# Patient Record
Sex: FEMALE | Race: WHITE | NOT HISPANIC OR LATINO | Employment: UNEMPLOYED | URBAN - METROPOLITAN AREA
[De-identification: names, ages, dates, MRNs, and addresses within clinical notes are randomized per-mention and may not be internally consistent; named-entity substitution may affect disease eponyms.]

---

## 2019-01-21 ENCOUNTER — APPOINTMENT (EMERGENCY)
Dept: CT IMAGING | Facility: HOSPITAL | Age: 54
End: 2019-01-21
Payer: MEDICARE

## 2019-01-21 ENCOUNTER — HOSPITAL ENCOUNTER (EMERGENCY)
Facility: HOSPITAL | Age: 54
Discharge: HOME | End: 2019-01-21
Attending: EMERGENCY MEDICINE | Admitting: EMERGENCY MEDICINE
Payer: MEDICARE

## 2019-01-21 VITALS
HEART RATE: 81 BPM | DIASTOLIC BLOOD PRESSURE: 61 MMHG | OXYGEN SATURATION: 99 % | WEIGHT: 180 LBS | RESPIRATION RATE: 20 BRPM | HEIGHT: 61 IN | TEMPERATURE: 97.5 F | SYSTOLIC BLOOD PRESSURE: 114 MMHG | BODY MASS INDEX: 33.99 KG/M2

## 2019-01-21 DIAGNOSIS — J06.9 URI (UPPER RESPIRATORY INFECTION): Primary | ICD-10-CM

## 2019-01-21 DIAGNOSIS — R42 VERTIGO: ICD-10-CM

## 2019-01-21 LAB
ALBUMIN SERPL BCP-MCNC: 4 G/DL (ref 3.5–5)
ALP SERPL-CCNC: 116 U/L (ref 46–116)
ALT SERPL W P-5'-P-CCNC: 78 U/L (ref 12–78)
ANION GAP SERPL CALCULATED.3IONS-SCNC: 11 MMOL/L (ref 4–13)
AST SERPL W P-5'-P-CCNC: 67 U/L (ref 5–45)
ATRIAL RATE: 88 BPM
BASOPHILS # BLD AUTO: 0.03 THOUSANDS/ΜL (ref 0–0.1)
BASOPHILS NFR BLD AUTO: 0 % (ref 0–1)
BILIRUB SERPL-MCNC: 0.6 MG/DL (ref 0.2–1)
BUN SERPL-MCNC: 13 MG/DL (ref 5–25)
CALCIUM SERPL-MCNC: 9.1 MG/DL (ref 8.3–10.1)
CHLORIDE SERPL-SCNC: 98 MMOL/L (ref 100–108)
CLARITY, POC: CLEAR
CO2 SERPL-SCNC: 29 MMOL/L (ref 21–32)
COLOR, POC: YELLOW
CREAT SERPL-MCNC: 0.72 MG/DL (ref 0.6–1.3)
EOSINOPHIL # BLD AUTO: 0.04 THOUSAND/ΜL (ref 0–0.61)
EOSINOPHIL NFR BLD AUTO: 1 % (ref 0–6)
ERYTHROCYTE [DISTWIDTH] IN BLOOD BY AUTOMATED COUNT: 12.2 % (ref 11.6–15.1)
EXT BILIRUBIN, UA: NORMAL
EXT BLOOD URINE: NORMAL
EXT GLUCOSE, UA: NORMAL
EXT KETONES: NORMAL
EXT NITRITE, UA: NORMAL
EXT PH, UA: 6.5
EXT PROTEIN, UA: NORMAL
EXT SPECIFIC GRAVITY, UA: 1.02
EXT UROBILINOGEN: 0.2
GFR SERPL CREATININE-BSD FRML MDRD: 96 ML/MIN/1.73SQ M
GLUCOSE SERPL-MCNC: 142 MG/DL (ref 65–140)
HCT VFR BLD AUTO: 45.3 % (ref 34.8–46.1)
HGB BLD-MCNC: 14.9 G/DL (ref 11.5–15.4)
IMM GRANULOCYTES # BLD AUTO: 0.02 THOUSAND/UL (ref 0–0.2)
IMM GRANULOCYTES NFR BLD AUTO: 0 % (ref 0–2)
LYMPHOCYTES # BLD AUTO: 1.03 THOUSANDS/ΜL (ref 0.6–4.47)
LYMPHOCYTES NFR BLD AUTO: 14 % (ref 14–44)
MCH RBC QN AUTO: 29.7 PG (ref 26.8–34.3)
MCHC RBC AUTO-ENTMCNC: 32.9 G/DL (ref 31.4–37.4)
MCV RBC AUTO: 90 FL (ref 82–98)
MONOCYTES # BLD AUTO: 0.77 THOUSAND/ΜL (ref 0.17–1.22)
MONOCYTES NFR BLD AUTO: 11 % (ref 4–12)
NEUTROPHILS # BLD AUTO: 5.28 THOUSANDS/ΜL (ref 1.85–7.62)
NEUTS SEG NFR BLD AUTO: 74 % (ref 43–75)
NRBC BLD AUTO-RTO: 0 /100 WBCS
P AXIS: 74 DEGREES
PLATELET # BLD AUTO: 335 THOUSANDS/UL (ref 149–390)
PMV BLD AUTO: 8.8 FL (ref 8.9–12.7)
POTASSIUM SERPL-SCNC: 4 MMOL/L (ref 3.5–5.3)
PR INTERVAL: 194 MS
PROT SERPL-MCNC: 8.3 G/DL (ref 6.4–8.2)
QRS AXIS: 125 DEGREES
QRSD INTERVAL: 84 MS
QT INTERVAL: 342 MS
QTC INTERVAL: 413 MS
RBC # BLD AUTO: 5.02 MILLION/UL (ref 3.81–5.12)
SODIUM SERPL-SCNC: 138 MMOL/L (ref 136–145)
T WAVE AXIS: 90 DEGREES
TROPONIN I SERPL-MCNC: <0.02 NG/ML
VENTRICULAR RATE: 88 BPM
WBC # BLD AUTO: 7.17 THOUSAND/UL (ref 4.31–10.16)
WBC # BLD EST: NORMAL 10*3/UL

## 2019-01-21 PROCEDURE — 70450 CT HEAD/BRAIN W/O DYE: CPT

## 2019-01-21 PROCEDURE — 84484 ASSAY OF TROPONIN QUANT: CPT | Performed by: PHYSICIAN ASSISTANT

## 2019-01-21 PROCEDURE — 93005 ELECTROCARDIOGRAM TRACING: CPT

## 2019-01-21 PROCEDURE — 80053 COMPREHEN METABOLIC PANEL: CPT | Performed by: PHYSICIAN ASSISTANT

## 2019-01-21 PROCEDURE — 36415 COLL VENOUS BLD VENIPUNCTURE: CPT | Performed by: PHYSICIAN ASSISTANT

## 2019-01-21 PROCEDURE — 85025 COMPLETE CBC W/AUTO DIFF WBC: CPT | Performed by: PHYSICIAN ASSISTANT

## 2019-01-21 PROCEDURE — 81002 URINALYSIS NONAUTO W/O SCOPE: CPT | Performed by: PHYSICIAN ASSISTANT

## 2019-01-21 PROCEDURE — 99284 EMERGENCY DEPT VISIT MOD MDM: CPT

## 2019-01-21 PROCEDURE — 93010 ELECTROCARDIOGRAM REPORT: CPT | Performed by: INTERNAL MEDICINE

## 2019-01-21 RX ORDER — MECLIZINE HYDROCHLORIDE 25 MG/1
25 TABLET ORAL 3 TIMES DAILY PRN
Qty: 20 TABLET | Refills: 0 | Status: SHIPPED | OUTPATIENT
Start: 2019-01-21 | End: 2019-11-12 | Stop reason: ALTCHOICE

## 2019-01-21 RX ORDER — ONDANSETRON 4 MG/1
4 TABLET, ORALLY DISINTEGRATING ORAL EVERY 6 HOURS PRN
Qty: 15 TABLET | Refills: 0 | Status: SHIPPED | OUTPATIENT
Start: 2019-01-21 | End: 2019-11-12 | Stop reason: ALTCHOICE

## 2019-01-21 RX ORDER — MECLIZINE HCL 12.5 MG/1
25 TABLET ORAL ONCE
Status: COMPLETED | OUTPATIENT
Start: 2019-01-21 | End: 2019-01-21

## 2019-01-21 RX ADMIN — MECLIZINE 25 MG: 12.5 TABLET ORAL at 11:25

## 2019-01-21 NOTE — ED PROVIDER NOTES
History  Chief Complaint   Patient presents with    Dizziness     Patient has had a could with a cough and early this morining felt dizzy " I was wakling into [place]"     Patient is a 49 y/o F with a h/o CAD that presents to the ED with dizziness that started at 3 AM this morning  She states she started with cold symptoms and cough yesterday, but this morning with dizziness  She describes it as the room is spinning  Worse with movement of eyes, walking, and opening eyes  Better when closing eyes and remaining still  No recent head injury  No OTC meds  No recent medication changes  No ear pain  She denies chest pain or SOB  Patient does not know what medications she takes and did not bring a list with her  History provided by:  Patient  Dizziness   Quality:  Room spinning and imbalance  Severity:  Moderate  Onset quality:  Sudden  Duration:  7 hours  Timing:  Constant  Progression:  Unchanged  Chronicity:  New  Context: eye movement and head movement    Relieved by:  Being still and closing eyes  Worsened by: Movement and eye movement  Ineffective treatments:  None tried  Associated symptoms: nausea    Associated symptoms: no blood in stool, no chest pain, no diarrhea, no headaches, no hearing loss, no palpitations, no shortness of breath, no syncope, no tinnitus, no vision changes, no vomiting and no weakness    Risk factors: heart disease        None       Past Medical History:   Diagnosis Date    Open injury of heart        No past surgical history on file  No family history on file  I have reviewed and agree with the history as documented  Social History   Substance Use Topics    Smoking status: Former Smoker    Smokeless tobacco: Not on file    Alcohol use Yes      Comment: social        Review of Systems   Constitutional: Negative for chills and fever  HENT: Positive for congestion and rhinorrhea  Negative for ear pain, hearing loss and tinnitus      Respiratory: Positive for cough  Negative for shortness of breath  Cardiovascular: Negative for chest pain, palpitations and syncope  Gastrointestinal: Positive for nausea  Negative for blood in stool, diarrhea and vomiting  Musculoskeletal: Negative for back pain and neck pain  Skin: Negative for color change and rash  Neurological: Positive for dizziness  Negative for seizures, syncope, facial asymmetry, speech difficulty, weakness, light-headedness, numbness and headaches  Psychiatric/Behavioral: Negative for confusion  All other systems reviewed and are negative  Physical Exam  Physical Exam   Constitutional: She is oriented to person, place, and time  She appears well-developed and well-nourished  She is cooperative  She does not appear ill  No distress  HENT:   Head: Normocephalic and atraumatic  Right Ear: Hearing and tympanic membrane normal    Left Ear: Hearing and tympanic membrane normal    Nose: Rhinorrhea present  Mouth/Throat: Oropharynx is clear and moist and mucous membranes are normal    Eyes: Pupils are equal, round, and reactive to light  Conjunctivae are normal    Patient unable to follow finger with her eyes due to the dizziness  Neck: Normal range of motion  Cardiovascular: Normal rate, regular rhythm and normal heart sounds  No murmur heard  Pulmonary/Chest: Effort normal and breath sounds normal    Musculoskeletal: Normal range of motion  She exhibits no edema  Neurological: She is alert and oriented to person, place, and time  She has normal strength  No cranial nerve deficit or sensory deficit  GCS eye subscore is 4  GCS verbal subscore is 5  GCS motor subscore is 6  Skin: Skin is warm and dry  No rash noted  She is not diaphoretic  No pallor  Psychiatric: She has a normal mood and affect  Nursing note and vitals reviewed        Vital Signs  ED Triage Vitals   Temperature Pulse Respirations Blood Pressure SpO2   01/21/19 1007 01/21/19 1001 01/21/19 1001 01/21/19 1001 01/21/19 1001   97 5 °F (36 4 °C) 87 22 124/60 100 %      Temp src Heart Rate Source Patient Position - Orthostatic VS BP Location FiO2 (%)   -- -- 01/21/19 1001 01/21/19 1001 --     Lying Right arm       Pain Score       01/21/19 1001       No Pain           Vitals:    01/21/19 1030 01/21/19 1045 01/21/19 1115 01/21/19 1130   BP: 105/59   114/61   Pulse: 82 83 82 83   Patient Position - Orthostatic VS:           Visual Acuity  Visual Acuity      Most Recent Value   L Pupil Size (mm)  3   R Pupil Size (mm)  3          ED Medications  Medications    EMS REPLENISHMENT MED (not administered)   meclizine (ANTIVERT) tablet 25 mg (25 mg Oral Given 1/21/19 1125)       Diagnostic Studies  Results Reviewed     Procedure Component Value Units Date/Time    POCT urinalysis dipstick [[id number]]  (Normal) Resulted:  01/21/19 1126    Lab Status:  Edited Result - FINAL Specimen:  Urine Updated:  01/21/19 1129     Color, UA YELLOW     Clarity, UA CLEAR     Glucose, UA (Ref: Negative) NEG     Bilirubin, UA (Ref: Negative) NEG     Ketones, UA (Ref: Negative) NEG     Spec Grav, UA (Ref:1 003-1 030) 1 020     Blood, UA (Ref: Negative) TRACE     pH, UA (Ref: 4 5-8 0) 6 5     Protein, UA (Ref: Negative) TRACE     Urobilinogen, UA (Ref: 0 2- 1 0) 0 2      Leukocytes, UA (Ref: Negative) NEG     Nitrite, UA (Ref: Negative) NEG    Troponin I [[id number]]  (Normal) Collected:  01/21/19 1025    Lab Status:  Final result Specimen:  Blood from Arm, Left Updated:  01/21/19 1107     Troponin I <0 02 ng/mL     Comprehensive metabolic panel [[id number]]  (Abnormal) Collected:  01/21/19 1025    Lab Status:  Final result Specimen:  Blood from Arm, Left Updated:  01/21/19 1105     Sodium 138 mmol/L      Potassium 4 0 mmol/L      Chloride 98 (L) mmol/L      CO2 29 mmol/L      ANION GAP 11 mmol/L      BUN 13 mg/dL      Creatinine 0 72 mg/dL      Glucose 142 (H) mg/dL      Calcium 9 1 mg/dL      AST 67 (H) U/L      ALT 78 U/L      Alkaline Phosphatase 116 U/L Total Protein 8 3 (H) g/dL      Albumin 4 0 g/dL      Total Bilirubin 0 60 mg/dL      eGFR 96 ml/min/1 73sq m     Narrative:         National Kidney Disease Education Program recommendations are as follows:  GFR calculation is accurate only with a steady state creatinine  Chronic Kidney disease less than 60 ml/min/1 73 sq  meters  Kidney failure less than 15 ml/min/1 73 sq  meters  CBC and differential [[id number]]  (Abnormal) Collected:  01/21/19 1025    Lab Status:  Final result Specimen:  Blood from Arm, Left Updated:  01/21/19 1048     WBC 7 17 Thousand/uL      RBC 5 02 Million/uL      Hemoglobin 14 9 g/dL      Hematocrit 45 3 %      MCV 90 fL      MCH 29 7 pg      MCHC 32 9 g/dL      RDW 12 2 %      MPV 8 8 (L) fL      Platelets 119 Thousands/uL      nRBC 0 /100 WBCs      Neutrophils Relative 74 %      Immat GRANS % 0 %      Lymphocytes Relative 14 %      Monocytes Relative 11 %      Eosinophils Relative 1 %      Basophils Relative 0 %      Neutrophils Absolute 5 28 Thousands/µL      Immature Grans Absolute 0 02 Thousand/uL      Lymphocytes Absolute 1 03 Thousands/µL      Monocytes Absolute 0 77 Thousand/µL      Eosinophils Absolute 0 04 Thousand/µL      Basophils Absolute 0 03 Thousands/µL                  CT head without contrast   Final Result by Joseph Ornelas DO (01/21 1104)      No acute intracranial abnormality                    Workstation performed: [id number]                    Procedures  ECG 12 Lead Documentation  Date/Time: 1/21/2019 10:00 AM  Performed by: Liz Kate by: Minnie Tracy     Indications / Diagnosis:  Dizzy  Patient location:  ED  Previous ECG:     Previous ECG:  Unavailable  Rate:     ECG rate:  88  Rhythm:     Rhythm: sinus rhythm    Conduction:     Conduction: abnormal      Abnormal conduction: LPFB    ST segments:     ST segments:  Normal  T waves:     T waves: non-specific             Phone Contacts  ED Phone Contact    ED Course  ED Course as of Jan 21 1147   Mon Jan 21, 2019   1136 Patient feeling better, able to tolerate po, ambulated to bathroom without difficulty  EOMI, no nystagmus  MDM  Number of Diagnoses or Management Options  URI (upper respiratory infection): minor  Vertigo: new and requires workup  Diagnosis management comments: Patient with dizziness, will check labs to r/o anemia, electrolyte abnormality, cardiac disease  CT scan to r/o brain hemorrhage or tumor  Patient did improve after taking antivert, ambulating without difficulty, tolerating po, sitting up in bed looking at phone, will d/c with close f/u with PCP  Amount and/or Complexity of Data Reviewed  Clinical lab tests: ordered and reviewed  Tests in the radiology section of CPT®: ordered and reviewed    Patient Progress  Patient progress: improved    CritCare Time    Disposition  Final diagnoses:   URI (upper respiratory infection)   Vertigo     Time reflects when diagnosis was documented in both MDM as applicable and the Disposition within this note     Time User Action Codes Description Comment    1/21/2019 11:41 AM Edward Martel [J06 [id number]] URI (upper respiratory infection)     1/21/2019 11:41 AM Edward Martel [[id number]] Vertigo       ED Disposition     ED Disposition Condition Comment    Discharge  Devi Chun discharge to home/self care      Condition at discharge: Stable        Follow-up Information     Follow up With Specialties Details Why Contact Info    Rula Huitron  In 2 days For recheck [address] [facility]  [phone]            Patient's Medications   Discharge Prescriptions    MECLIZINE (ANTIVERT) 25 MG TABLET    Take 1 tablet (25 mg total) by mouth 3 (three) times a day as needed for dizziness       Start Date: 1/21/2019 End Date: --       Order Dose: 25 mg       Quantity: 20 tablet    Refills: 0    ONDANSETRON (ZOFRAN-ODT) 4 MG DISINTEGRATING TABLET    Take 1 tablet (4 mg total) by mouth every 6 (six) hours as needed for nausea       Start Date: 1/21/2019 End Date: --       Order Dose: 4 mg       Quantity: 15 tablet    Refills: 0     No discharge procedures on file      ED Provider  Electronically Signed by           Emiliano Clemens PA-C  01/21/19 1143

## 2019-01-21 NOTE — DISCHARGE INSTRUCTIONS
Rest, increase fluids  Take antivert for dizziness, zofran as needed for nausea  Follow up with family doctor for recheck in 2-3 days or sooner if symptoms worsen  Dizziness, Ambulatory Care   GENERAL INFORMATION:   Dizziness  is a term used to describe a feeling of being off balance or unsteady  Common causes of dizziness are an inner ear fluid imbalance or a lack of oxygen in your blood  Your dizziness may be acute (lasts 3 days or less) or chronic (lasts longer than 3 days)  You may have dizzy spells that last from seconds to a few hours  Common symptoms related to dizziness include the following:   · A feeling that your surroundings are moving even though you are standing still    · Ringing in your ears or hearing loss     · Feeling faint or lightheaded     · Weakness or unsteadiness     · Double vision or eye movements you cannot control    · Nausea or vomiting     · Confusion  Seek immediate care for the following symptoms:   · You have a headache that does not go away with medicine  · You have shaking chills and a fever  · You vomit over and over with no relief  · Your vomit or bowel movements are red or black  · You have pain in your chest, back, or abdomen  · You have numbness, especially in your face, arms, or legs  · You have trouble moving your arms or legs  Treatment for dizziness  depends on the cause of your dizziness  You may need medicines to decrease your dizziness or nausea  You may need to be admitted to the hospital for treatment  Manage your symptoms:  Get up slowly from sitting or lying down  Do not drive or operate machinery when you are dizzy  Follow up with your healthcare provider as directed:  Write down your questions so you remember to ask them during your visits  CARE AGREEMENT:   You have the right to help plan your care  Learn about your health condition and how it may be treated   Discuss treatment options with your caregivers to decide what care you want to receive  You always have the right to refuse treatment  The above information is an  only  It is not intended as medical advice for individual conditions or treatments  Talk to your doctor, nurse or pharmacist before following any medical regimen to see if it is safe and effective for you  © 2014 [address] is for End User's use only and may not be sold, redistributed or otherwise used for commercial purposes  All illustrations and images included in CareNotes® are the copyrighted property of [employer]  or [employer]  Upper Respiratory Infection   WHAT YOU NEED TO KNOW:   An upper respiratory infection is also called the common cold  It is an infection that can affect your nose, throat, ears, and sinuses  For healthy people, the common cold is usually not serious and does not need special treatment  Cold symptoms are usually worst for the first 3 to 5 days  Most people get better in 7 to 14 days  You may continue to cough for 2 to 3 weeks  Colds are caused by viruses and do not get better with antibiotics  DISCHARGE INSTRUCTIONS:   Return to the emergency department if:   · You have chest pain or trouble breathing  Contact your healthcare provider if:   · You have a fever over 102ºF (39°C)  · Your sore throat gets worse or you see white or yellow spots in your throat  · Your symptoms get worse after 3 to 5 days or your cold is not better in 14 days  · You have a rash anywhere on your skin  · You have large, tender lumps in your neck  · You have thick, green or yellow drainage from your nose  · You cough up thick yellow, green, or bloody mucus  · You have vomiting for more than 24 hours and cannot keep fluids down  · You have a bad earache  · You have questions or concerns about your condition or care  Medicines:   You may need any of the following:  · Decongestants  help reduce nasal congestion and help you breathe more easily  If you take decongestant pills, they may make you feel restless or cause problems with your sleep  Do not use decongestant sprays for more than a few days  · Cough suppressants  help reduce coughing  Ask your healthcare provider which type of cough medicine is best for you  · NSAIDs , such as ibuprofen, help decrease swelling, pain, and fever  NSAIDs can cause stomach bleeding or kidney problems in certain people  If you take blood thinner medicine, always ask your healthcare provider if NSAIDs are safe for you  Always read the medicine label and follow directions  · Acetaminophen  decreases pain and fever  It is available without a doctor's order  Ask how much to take and how often to take it  Follow directions  Read the labels of all other medicines you are using to see if they also contain acetaminophen, or ask your doctor or pharmacist  Acetaminophen can cause liver damage if not taken correctly  Do not use more than 4 grams (4,000 milligrams) total of acetaminophen in one day  · Take your medicine as directed  Contact your healthcare provider if you think your medicine is not helping or if you have side effects  Tell him or her if you are allergic to any medicine  Keep a list of the medicines, vitamins, and herbs you take  Include the amounts, and when and why you take them  Bring the list or the pill bottles to follow-up visits  Carry your medicine list with you in case of an emergency  Follow up with your healthcare provider as directed:  Write down your questions so you remember to ask them during your visits  Self-care:   · Rest as much as possible  Slowly start to do more each day  · Drink more liquids as directed  Liquids will help thin and loosen mucus so you can cough it up  Liquids will also help prevent dehydration  Liquids that help prevent dehydration include water, fruit juice, and broth  Do not drink liquids that contain caffeine   Caffeine can increase your risk for dehydration  Ask your healthcare provider how much liquid to drink each day  · Soothe a sore throat  Gargle with warm salt water  This helps your sore throat feel better  Make salt water by dissolving ¼ teaspoon salt in 1 cup warm water  You may also suck on hard candy or throat lozenges  You may use a sore throat spray  · Use a humidifier or vaporizer  Use a cool mist humidifier or a vaporizer to increase air moisture in your home  This may make it easier for you to breathe and help decrease your cough  · Use saline nasal drops as directed  These help relieve congestion  · Apply petroleum-based jelly around the outside of your nostrils  This can decrease irritation from blowing your nose  · Do not smoke  Nicotine and other chemicals in cigarettes and cigars can make your symptoms worse  They can also cause infections such as bronchitis or pneumonia  Ask your healthcare provider for information if you currently smoke and need help to quit  E-cigarettes or smokeless tobacco still contain nicotine  Talk to your healthcare provider before you use these products  Prevent spreading your cold to others:   · Try to stay away from other people during the first 2 to 3 days of your cold when it is more easily spread  · Do not share food or drinks  · Do not share hand towels with household members  · Wash your hands often, especially after you blow your nose  Turn away from other people and cover your mouth and nose with a tissue when you sneeze or cough  © 2017 [address] Information is for End User's use only and may not be sold, redistributed or otherwise used for commercial purposes  All illustrations and images included in CareNotes® are the copyrighted property of [employer]  or [employer]  The above information is an  only  It is not intended as medical advice for individual conditions or treatments   Talk to your doctor, nurse or pharmacist before following any medical regimen to see if it is safe and effective for you  Vertigo   WHAT YOU NEED TO KNOW:   Vertigo is a condition that causes you to feel dizzy  You may feel that you or everything around you is moving or spinning  You may also feel like you are being pulled down or toward your side  DISCHARGE INSTRUCTIONS:   Return to the emergency department if:   · You have a headache and a stiff neck  · You have shaking chills and a fever  · You vomit over and over with no relief  · You have blood, pus, or fluid coming out of your ears  · You are confused  Contact your healthcare provider if:   · Your symptoms do not get better with treatment  · You have questions about your condition or care  Medicines:   · Medicine  may be given to help relieve your symptoms  · Take your medicine as directed  Contact your healthcare provider if you think your medicine is not helping or if you have side effects  Tell him or her if you are allergic to any medicine  Keep a list of the medicines, vitamins, and herbs you take  Include the amounts, and when and why you take them  Bring the list or the pill bottles to follow-up visits  Carry your medicine list with you in case of an emergency  Manage your symptoms:   · Do not drive , walk without help, or operate heavy machinery when you are dizzy  · Move slowly  when you move from one position to another position  Get up slowly from sitting or lying down  Sit or lie down right away if you feel dizzy  · Drink plenty of liquids  Liquids help prevent dehydration  Ask how much liquid to drink each day and which liquids are best for you  · Vestibular and balance rehabilitation therapy (VBRT)  is used to teach you exercises to improve your balance and strength  These exercises may help decrease your vertigo and improve your balance  Ask for more information about this therapy    Follow up with your healthcare provider as directed:  Write down your questions so you remember to ask them during your visits  © 2017 [address] Information is for End User's use only and may not be sold, redistributed or otherwise used for commercial purposes  All illustrations and images included in CareNotes® are the copyrighted property of [employer]  or [employer]  The above information is an  only  It is not intended as medical advice for individual conditions or treatments  Talk to your doctor, nurse or pharmacist before following any medical regimen to see if it is safe and effective for you

## 2019-11-12 ENCOUNTER — OFFICE VISIT (OUTPATIENT)
Dept: CARDIOLOGY CLINIC | Facility: CLINIC | Age: 54
End: 2019-11-12
Payer: MEDICARE

## 2019-11-12 VITALS
DIASTOLIC BLOOD PRESSURE: 86 MMHG | HEART RATE: 64 BPM | WEIGHT: 193 LBS | BODY MASS INDEX: 36.44 KG/M2 | SYSTOLIC BLOOD PRESSURE: 144 MMHG | HEIGHT: 61 IN

## 2019-11-12 DIAGNOSIS — I25.10 CORONARY ARTERY DISEASE INVOLVING NATIVE CORONARY ARTERY OF NATIVE HEART WITHOUT ANGINA PECTORIS: Primary | ICD-10-CM

## 2019-11-12 DIAGNOSIS — Z95.5 S/P INSERTION OF NON-DRUG ELUTING CORONARY ARTERY STENT: ICD-10-CM

## 2019-11-12 DIAGNOSIS — Z95.1 S/P CABG X 2: ICD-10-CM

## 2019-11-12 DIAGNOSIS — E78.00 HYPERCHOLESTEREMIA: ICD-10-CM

## 2019-11-12 DIAGNOSIS — I25.810 CORONARY ARTERY DISEASE INVOLVING CORONARY BYPASS GRAFT OF NATIVE HEART WITHOUT ANGINA PECTORIS: ICD-10-CM

## 2019-11-12 DIAGNOSIS — I10 ESSENTIAL HYPERTENSION: ICD-10-CM

## 2019-11-12 PROBLEM — R06.09 DYSPNEA ON EXERTION: Status: ACTIVE | Noted: 2019-11-12

## 2019-11-12 PROBLEM — R06.00 DYSPNEA ON EXERTION: Status: ACTIVE | Noted: 2019-11-12

## 2019-11-12 PROCEDURE — 99204 OFFICE O/P NEW MOD 45 MIN: CPT | Performed by: INTERNAL MEDICINE

## 2019-11-12 RX ORDER — ESCITALOPRAM OXALATE 5 MG/1
5 TABLET ORAL DAILY
COMMUNITY
End: 2020-01-10 | Stop reason: SDUPTHER

## 2019-11-12 RX ORDER — CARVEDILOL 6.25 MG/1
6.25 TABLET ORAL 2 TIMES DAILY WITH MEALS
COMMUNITY
Start: 2019-04-18 | End: 2020-01-10 | Stop reason: SDUPTHER

## 2019-11-12 RX ORDER — ATORVASTATIN CALCIUM 80 MG/1
80 TABLET, FILM COATED ORAL DAILY
COMMUNITY
Start: 2019-10-10 | End: 2020-01-10 | Stop reason: SDUPTHER

## 2019-11-12 RX ORDER — ACETAMINOPHEN 500 MG
1000 TABLET ORAL EVERY 6 HOURS PRN
COMMUNITY
End: 2021-01-25

## 2019-11-12 RX ORDER — ASPIRIN 81 MG/1
81 TABLET ORAL DAILY
COMMUNITY

## 2019-11-12 RX ORDER — LOSARTAN POTASSIUM AND HYDROCHLOROTHIAZIDE 12.5; 5 MG/1; MG/1
1 TABLET ORAL DAILY
COMMUNITY
Start: 2019-10-10 | End: 2020-01-10 | Stop reason: SDUPTHER

## 2019-11-12 RX ORDER — ZOLPIDEM TARTRATE 10 MG/1
10 TABLET ORAL DAILY PRN
COMMUNITY
Start: 2019-08-26 | End: 2020-01-10 | Stop reason: SDUPTHER

## 2019-11-12 RX ORDER — GLIPIZIDE 5 MG/1
5 TABLET, FILM COATED, EXTENDED RELEASE ORAL DAILY
COMMUNITY
Start: 2019-09-19 | End: 2020-01-10 | Stop reason: SDUPTHER

## 2019-11-12 RX ORDER — CLOPIDOGREL BISULFATE 75 MG/1
75 TABLET ORAL DAILY
COMMUNITY
End: 2020-01-10 | Stop reason: SDUPTHER

## 2019-11-12 NOTE — PROGRESS NOTES
Cardiology Consultation     Mat Galvan  [id number]  1965  [facility] [address] [facility] [place] [address] [address] [phone]    1  Coronary artery disease involving native coronary artery of native heart without angina pectoris  Comprehensive metabolic panel    Comprehensive metabolic panel    TSH, 3rd generation    NM myocardial perfusion spect (stress and/or rest)    Echo complete with contrast if indicated   2  Coronary artery disease involving coronary bypass graft of native heart without angina pectoris  Echo complete with contrast if indicated   3  S/P CABG x 2  NM myocardial perfusion spect (stress and/or rest)   4  S/P insertion of non-drug eluting coronary artery stent  NM myocardial perfusion spect (stress and/or rest)    Echo complete with contrast if indicated   5  Essential hypertension     6  Hypercholesteremia  Comprehensive metabolic panel    HEMOGLOBIN A1C W/ EAG ESTIMATION    Lipid Panel with Direct LDL reflex    Comprehensive metabolic panel       HPI:    Mrs Jairon Owusu comes in for a consultation to establish care given her cardiac history  Hernando Cote had coronary artery disease diagnosed back in 2011  In January 2011 she had a drug-eluting stent placed to the left circumflex  Then in 2012 she had CABG x2 with a LIMA to the LAD and a SVG to RCA  She had this work done at [facility]   She was then following with [facility], in 2015 had a cardiac catheterization that showed critical stenosis in the distal portion of her vein graft to her RCA  She received a drug-eluting stent to this region  She has for the most part done well since  She has normal LV systolic function without valve disease by echocardiogram earlier this year  She does have obstructive sleep apnea, and has been off of CPAP until recently  Within the last month she restarted this    She also has diabetes mellitus, and has not had any follow-up with primary care provider recently  She lived near [place], [place] for a few years and earlier this year reestablished here  She is a former tobacco smoker, currently abstaining but she does occasionally smoke marijuana  Jesus Kate has noticed some worsening exercise tolerance, fatigue and shortness of breath  She does not have a car and she walks to work, as she works at [employer], and she notices that the walk is taking a bit longer and it is a little bit more taxing on her  She notices shortness of breath with exertion, particularly going up a hill and worsening fatigue  She denies chest pain or any other symptoms of angina  She does have mild lower extremity edema, but denies any other signs or symptoms of CHF  No palpitations, lightheadedness or any syncope  She did have some palpitations earlier this year in wore a Holter monitor which was essentially normal   She has been tolerating all of her medical therapy  She has noticed some tingling/paresthesias in her feet        Patient Active Problem List   Diagnosis    Coronary artery disease involving native coronary artery of native heart without angina pectoris    S/P CABG x 2    Coronary artery disease involving coronary bypass graft of native heart without angina pectoris    S/P insertion of non-drug eluting coronary artery stent    Essential hypertension    Hypercholesteremia    Dyspnea on exertion     Past Medical History:   Diagnosis Date    Open injury of heart      Social History     Socioeconomic History    Marital status:      Spouse name: Not on file    Number of children: Not on file    Years of education: Not on file    Highest education level: Not on file   Occupational History    Not on file   Social Needs    Financial resource strain: Not on file    Food insecurity:     Worry: Not on file     Inability: Not on file    Transportation needs:     Medical: Not on file Non-medical: Not on file   Tobacco Use    Smoking status: Former Smoker   Substance and Sexual Activity    Alcohol use: Yes     Comment: social    Drug use: No    Sexual activity: Not on file   Lifestyle    Physical activity:     Days per week: Not on file     Minutes per session: Not on file    Stress: Not on file   Relationships    Social connections:     Talks on phone: Not on file     Gets together: Not on file     Attends [employer] service: Not on file     Active member of club or organization: Not on file     Attends meetings of clubs or organizations: Not on file     Relationship status: Not on file    Intimate partner violence:     Fear of current or ex partner: Not on file     Emotionally abused: Not on file     Physically abused: Not on file     Forced sexual activity: Not on file   Other Topics Concern    Not on file   Social History Narrative    Not on file      No family history on file  No past surgical history on file      Current Outpatient Medications:     acetaminophen (TYLENOL) 500 mg tablet, Take 1,000 mg by mouth every 6 (six) hours as needed, Disp: , Rfl:     aspirin (ECOTRIN LOW STRENGTH) 81 mg EC tablet, Take 81 mg by mouth daily, Disp: , Rfl:     atorvastatin (LIPITOR) 80 mg tablet, Take 80 mg by mouth daily, Disp: , Rfl:     carvedilol (COREG) 6 25 mg tablet, Take 6 25 mg by mouth 2 (two) times a day with meals , Disp: , Rfl:     clopidogrel (PLAVIX) 75 mg tablet, Take 75 mg by mouth daily, Disp: , Rfl:     escitalopram (LEXAPRO) 5 mg tablet, Take 5 mg by mouth daily, Disp: , Rfl:     glipiZIDE (GLUCOTROL XL) 5 mg 24 hr tablet, Take 5 mg by mouth daily, Disp: , Rfl:     losartan-hydrochlorothiazide (HYZAAR) 50-12 5 mg per tablet, Take 1 tablet by mouth daily, Disp: , Rfl:     MELATONIN PO, Take 12 mg by mouth daily at bedtime , Disp: , Rfl:     zolpidem (AMBIEN) 10 mg tablet, Take 10 mg by mouth daily as needed, Disp: , Rfl:   Allergies   Allergen Reactions    Metformin  Penicillins      Vitals:    11/12/19 1300   BP: 144/86   BP Location: Left arm   Patient Position: Sitting   Cuff Size: Standard   Pulse: 64   Weight: 87 5 kg (193 lb)   Height: 5' 1" (1 549 m)       Labs:  Lab Results   Component Value Date    K 4 0 01/21/2019    CL 98 (L) 01/21/2019    CO2 29 01/21/2019    BUN 13 01/21/2019    CREATININE 0 72 01/21/2019    CALCIUM 9 1 01/21/2019     Lab Results   Component Value Date    WBC 7 17 01/21/2019    HGB 14 9 01/21/2019    HCT 45 3 01/21/2019    MCV 90 01/21/2019     01/21/2019       Imaging:     ECHO (3/2019):   Normal left ventricular chamber size  Normal left ventricular wall thickness      Abnormal septal motion in setting of prior thoracotomy  Normal left     ventricular systolic function      Aortic sclerosis      Mild mitral regurgitation      Mild tricuspid regurgitation      Mild pulmonic regurgitation  The pulmonary artery pressure could not be calculated due to an     incomplete tricuspid regurgitant gradient measurement  Diastolic data     suggests normal left atrial pressure    Normal right atrial pressures (3mmHg) evidenced by normal size inferior     vena cava (<2 1cm) with normal inspiratory collapse (>50%)      Visually Estimated LV Ejection Fraction is:60%       Review of Systems:  Review of Systems   Constitutional: Positive for fatigue  HENT: Negative  Eyes: Negative  Respiratory: Positive for shortness of breath  Cardiovascular: Negative  Gastrointestinal: Negative  Musculoskeletal: Negative  Skin: Negative  Allergic/Immunologic: Negative  Neurological: Negative  Hematological: Negative  Psychiatric/Behavioral: Negative  All other systems reviewed and are negative  Vitals:    11/12/19 1300   BP: 144/86   Pulse: 64     Physical Exam:  Physical Exam   Constitutional: She is oriented to person, place, and time  She appears well-developed and well-nourished  HENT:   Head: Normocephalic and atraumatic  Eyes: Pupils are equal, round, and reactive to light  EOM are normal  Right eye exhibits no discharge  Left eye exhibits no discharge  No scleral icterus  Neck: Normal range of motion  Neck supple  No JVD present  No thyromegaly present  Cardiovascular: Normal rate, regular rhythm, S1 normal, S2 normal, normal heart sounds, intact distal pulses and normal pulses  No extrasystoles are present  Exam reveals no gallop, no friction rub and no decreased pulses  No murmur heard  Pulmonary/Chest: Effort normal and breath sounds normal  No respiratory distress  She has no wheezes  She has no rales  Abdominal: Soft  Bowel sounds are normal  She exhibits no distension  There is no tenderness  Musculoskeletal: Normal range of motion  She exhibits no tenderness or deformity  Left lower leg: She exhibits edema  Neurological: She is alert and oriented to person, place, and time  No cranial nerve deficit  Skin: Skin is warm and dry  No rash noted  Psychiatric: She has a normal mood and affect  Judgment and thought content normal    Nursing note and vitals reviewed  Discussion/Summary:    1  Dyspnea on exertion and worsening fatigue - There are multiple things that can be causing this  One likely reason is the fact that she has been off of CPAP for some time, and recently restarted this  However given her cardiovascular history we are going to do updated testing which will include an ischemic evaluation  She will have a stress nuclear study and echocardiogram in the near future  We also ordered a complete set of updated blood work  I have asked her to also establish care with a PCP locally to manage her diabetes and help with her other symptoms  2   CAD - Haven Ramos had drug-eluting stent placed to her left circumflex in 2011, CABG x2 in 2012, and then a drug-eluting stent placed to her SVG to RCA graft in 2015  She has done well since    However given this history, and some change in symptoms as described above, we are going to have her do an updated ischemic evaluation  She has been on dual anti-platelet therapy, and for testing is unremarkable she can stop the Plavix and just continue aspirin therapy  3   Hypertension - Mildly elevated  She should periodically check this at home  I have also asked her to establish care with a PCP  4   Hypercholesterolemia - She will remain on atorvastatin 80 mg daily  We ordered updated blood work that will include a lipid profile as well  5   GABI - She had been off CPAP, with moving and it was packed away  However, she recently restarted this and I have encouraged her to remain compliant on this  This could be contributing to her symptoms described above  6   DM - We ordered updated blood work for this as well  Again, I have asked her to establish care with a PCP especially given her paresthesias  Counseling / Coordination of Care  Total office time spent today 40 minutes  Greater than 50% of total time was spent with the patient and / or family counseling and / or coordination of care

## 2019-11-26 ENCOUNTER — HOSPITAL ENCOUNTER (OUTPATIENT)
Dept: NON INVASIVE DIAGNOSTICS | Facility: CLINIC | Age: 54
Discharge: HOME | End: 2019-11-26
Payer: MEDICARE

## 2019-11-26 DIAGNOSIS — Z95.1 S/P CABG X 2: ICD-10-CM

## 2019-11-26 DIAGNOSIS — I25.810 CORONARY ARTERY DISEASE INVOLVING CORONARY BYPASS GRAFT OF NATIVE HEART WITHOUT ANGINA PECTORIS: ICD-10-CM

## 2019-11-26 DIAGNOSIS — I25.10 CORONARY ARTERY DISEASE INVOLVING NATIVE CORONARY ARTERY OF NATIVE HEART WITHOUT ANGINA PECTORIS: ICD-10-CM

## 2019-11-26 DIAGNOSIS — Z95.5 S/P INSERTION OF NON-DRUG ELUTING CORONARY ARTERY STENT: ICD-10-CM

## 2019-11-26 LAB
CHEST PAIN STATEMENT: NORMAL
MAX DIASTOLIC BP: 76 MMHG
MAX HEART RATE: 134 BPM
MAX PREDICTED HEART RATE: 166 BPM
MAX. SYSTOLIC BP: 178 MMHG
PROTOCOL NAME: NORMAL
REASON FOR TERMINATION: NORMAL
TARGET HR FORMULA: NORMAL
TEST INDICATION: NORMAL
TIME IN EXERCISE PHASE: NORMAL

## 2019-11-26 PROCEDURE — A9502 TC99M TETROFOSMIN: HCPCS

## 2019-11-26 PROCEDURE — 93306 TTE W/DOPPLER COMPLETE: CPT

## 2019-11-26 PROCEDURE — 93306 TTE W/DOPPLER COMPLETE: CPT | Performed by: INTERNAL MEDICINE

## 2019-11-26 PROCEDURE — 78452 HT MUSCLE IMAGE SPECT MULT: CPT | Performed by: INTERNAL MEDICINE

## 2019-11-26 PROCEDURE — 93016 CV STRESS TEST SUPVJ ONLY: CPT | Performed by: INTERNAL MEDICINE

## 2019-11-26 PROCEDURE — 78452 HT MUSCLE IMAGE SPECT MULT: CPT

## 2019-11-26 PROCEDURE — 93017 CV STRESS TEST TRACING ONLY: CPT

## 2019-11-26 PROCEDURE — 93018 CV STRESS TEST I&R ONLY: CPT | Performed by: INTERNAL MEDICINE

## 2019-12-23 ENCOUNTER — TELEPHONE (OUTPATIENT)
Dept: CARDIOLOGY CLINIC | Facility: CLINIC | Age: 54
End: 2019-12-23

## 2019-12-23 NOTE — TELEPHONE ENCOUNTER
----- Message from Juanito Martinez MD sent at 12/18/2019  1:34 PM EST -----  Please call the patient regarding her stress nuclear study and echocardiogram   Her stress nuclear study for the most part looked fine  There was no ischemia  She has a very small scar on the inferior/posterior part of her apex corresponding to her prior vein graft blockage which was expected  Her echocardiogram showed normal LV function without significant valve disease  Thank you

## 2020-01-10 ENCOUNTER — TELEPHONE (OUTPATIENT)
Dept: FAMILY MEDICINE CLINIC | Facility: HOSPITAL | Age: 55
End: 2020-01-10

## 2020-01-10 ENCOUNTER — OFFICE VISIT (OUTPATIENT)
Dept: FAMILY MEDICINE CLINIC | Facility: HOSPITAL | Age: 55
End: 2020-01-10
Payer: MEDICARE

## 2020-01-10 ENCOUNTER — HOSPITAL ENCOUNTER (OUTPATIENT)
Dept: RADIOLOGY | Facility: HOSPITAL | Age: 55
Discharge: HOME | End: 2020-01-10
Payer: MEDICARE

## 2020-01-10 VITALS
HEART RATE: 73 BPM | BODY MASS INDEX: 37.69 KG/M2 | TEMPERATURE: 97.1 F | HEIGHT: 60 IN | WEIGHT: 192 LBS | OXYGEN SATURATION: 97 % | SYSTOLIC BLOOD PRESSURE: 102 MMHG | DIASTOLIC BLOOD PRESSURE: 68 MMHG

## 2020-01-10 DIAGNOSIS — Z00.00 MEDICARE ANNUAL WELLNESS VISIT, SUBSEQUENT: ICD-10-CM

## 2020-01-10 DIAGNOSIS — I10 ESSENTIAL HYPERTENSION: ICD-10-CM

## 2020-01-10 DIAGNOSIS — M79.672 FOOT PAIN, LEFT: ICD-10-CM

## 2020-01-10 DIAGNOSIS — G47.00 INSOMNIA, UNSPECIFIED TYPE: ICD-10-CM

## 2020-01-10 DIAGNOSIS — G47.37 CENTRAL SLEEP APNEA DUE TO MEDICAL CONDITION: ICD-10-CM

## 2020-01-10 DIAGNOSIS — E11.65 TYPE 2 DIABETES MELLITUS WITH HYPERGLYCEMIA, WITHOUT LONG-TERM CURRENT USE OF INSULIN (HCC): ICD-10-CM

## 2020-01-10 DIAGNOSIS — M79.672 FOOT PAIN, LEFT: Primary | ICD-10-CM

## 2020-01-10 DIAGNOSIS — F32.A DEPRESSION, UNSPECIFIED DEPRESSION TYPE: ICD-10-CM

## 2020-01-10 DIAGNOSIS — I25.10 CORONARY ARTERY DISEASE INVOLVING NATIVE CORONARY ARTERY OF NATIVE HEART WITHOUT ANGINA PECTORIS: Primary | ICD-10-CM

## 2020-01-10 DIAGNOSIS — E78.00 HYPERCHOLESTEREMIA: ICD-10-CM

## 2020-01-10 PROBLEM — G47.30 SLEEP APNEA: Status: ACTIVE | Noted: 2020-01-10

## 2020-01-10 PROCEDURE — 73630 X-RAY EXAM OF FOOT: CPT

## 2020-01-10 PROCEDURE — 99204 OFFICE O/P NEW MOD 45 MIN: CPT | Performed by: NURSE PRACTITIONER

## 2020-01-10 PROCEDURE — G0439 PPPS, SUBSEQ VISIT: HCPCS | Performed by: NURSE PRACTITIONER

## 2020-01-10 RX ORDER — ESCITALOPRAM OXALATE 5 MG/1
5 TABLET ORAL DAILY
Qty: 90 TABLET | Refills: 0 | Status: SHIPPED | OUTPATIENT
Start: 2020-01-10 | End: 2020-08-21

## 2020-01-10 RX ORDER — GLIPIZIDE 5 MG/1
5 TABLET, FILM COATED, EXTENDED RELEASE ORAL DAILY
Qty: 90 TABLET | Refills: 0 | Status: SHIPPED | OUTPATIENT
Start: 2020-01-10 | End: 2020-04-04

## 2020-01-10 RX ORDER — CARVEDILOL 6.25 MG/1
6.25 TABLET ORAL 2 TIMES DAILY WITH MEALS
Qty: 180 TABLET | Refills: 0 | Status: SHIPPED | OUTPATIENT
Start: 2020-01-10 | End: 2020-04-17

## 2020-01-10 RX ORDER — LOSARTAN POTASSIUM AND HYDROCHLOROTHIAZIDE 12.5; 5 MG/1; MG/1
1 TABLET ORAL DAILY
Qty: 90 TABLET | Refills: 0 | Status: SHIPPED | OUTPATIENT
Start: 2020-01-10 | End: 2020-06-02

## 2020-01-10 RX ORDER — ATORVASTATIN CALCIUM 80 MG/1
80 TABLET, FILM COATED ORAL DAILY
Qty: 90 TABLET | Refills: 0 | Status: SHIPPED | OUTPATIENT
Start: 2020-01-10 | End: 2020-04-17

## 2020-01-10 RX ORDER — ZOLPIDEM TARTRATE 10 MG/1
10 TABLET ORAL DAILY PRN
Qty: 90 TABLET | Refills: 0 | Status: SHIPPED | OUTPATIENT
Start: 2020-01-10 | End: 2020-05-29

## 2020-01-10 RX ORDER — CLOPIDOGREL BISULFATE 75 MG/1
75 TABLET ORAL DAILY
Qty: 90 TABLET | Refills: 0 | Status: SHIPPED | OUTPATIENT
Start: 2020-01-10 | End: 2020-11-09

## 2020-01-10 NOTE — ASSESSMENT & PLAN NOTE
Refill issued to continue high dose statin  She has orders to update FLP per cardiology - urged she complete ASAP

## 2020-01-10 NOTE — PROGRESS NOTES
Assessment/Plan:    DM (diabetes mellitus) ([facility] [address] )    No results found for: HGBA1C     On daily glipizide for what pt reports as IFG  She has orders to complete A1C per cardiology and urged she do so ASAP  On daily ARB  Discuss eye and foot exams at next appt in 3 months  Sleep apnea  Compliant w/c-pap  Coronary artery disease involving native coronary artery of native heart without angina pectoris  Maintain cardiology follow up as planned  Med refills issued  Return in 3 months for next routine OV  Hypertension  Stable BP control on current meds  Refills issued to continue same dose  Return in 3 months for next BP check  Hyperlipidemia  Refill issued to continue high dose statin  She has orders to update FLP per cardiology - urged she complete ASAP  BMI 37 0-37 9, adult  Weight loss encouraged w/diet and exercise efforts  Insomnia  PDMP reviewed and refill issued to continue zolpidem as rx'd  Depression  Mood stable w/PHQ score of 5 on daily sertraline  Refill issued to continue same dose  Return in 3 months for next med check  Diagnoses and all orders for this visit:    Coronary artery disease involving native coronary artery of native heart without angina pectoris  -     atorvastatin (LIPITOR) 80 mg tablet; Take 1 tablet (80 mg total) by mouth daily  -     clopidogrel (PLAVIX) 75 mg tablet; Take 1 tablet (75 mg total) by mouth daily    Essential hypertension  -     carvedilol (COREG) 6 25 mg tablet; Take 1 tablet (6 25 mg total) by mouth 2 (two) times a day with meals  -     losartan-hydrochlorothiazide (HYZAAR) 50-12 5 mg per tablet; Take 1 tablet by mouth daily    Hypercholesteremia    Type 2 diabetes mellitus with hyperglycemia, without long-term current use of insulin (HCC)  -     glipiZIDE (GLUCOTROL XL) 5 mg 24 hr tablet; Take 1 tablet (5 mg total) by mouth daily    BMI 37 0-37 9, adult    Insomnia, unspecified type  -     zolpidem (AMBIEN) 10 mg tablet;  Take 1 tablet (10 mg total) by mouth daily as needed for sleep    Depression, unspecified depression type  -     escitalopram (LEXAPRO) 5 mg tablet; Take 1 tablet (5 mg total) by mouth daily    Central sleep apnea due to medical condition    Other orders  -     Cancel: XR foot 3+ vw left; Future          Subjective:      Patient ID: Sunshine Sylvester is a 47 y o  female  Here to establish care and get med refills  Used to follow w/[facility] doctors but no longer has a car  Diagnosed w/prediabetes at least a year ago and started glipizide  Doesn't check blood sugars at home  Does have supplies  Saw Dr Nixon Grace in November and had stress test  Has not done blood work as ordered  Lives at [facility] and walks to [place] to work  Left foot pain w/walking there and standing for 4 hours  Started in June  Has not been evaluated yet  Takes tylenol at times without benefit  No pain in right foot  Last pap smear in 2015  Post menopausal  Refuses gyn exam   No flu shot this season (she refuses)  Refuses mammogram and colon screening  The following portions of the patient's history were reviewed and updated as appropriate: allergies, current medications, past family history, past medical history, past social history, past surgical history and problem list     Review of Systems   Constitutional: Negative for activity change, appetite change and fatigue  Respiratory: Negative for cough and shortness of breath  Cardiovascular: Negative for chest pain and palpitations  Musculoskeletal: Positive for arthralgias (left foot pain)  Psychiatric/Behavioral: Positive for dysphoric mood  Objective:      /68 (Patient Position: Sitting, Cuff Size: Standard)   Pulse 73   Temp (!) 97 1 °F (36 2 °C) (Tympanic)   Ht 5' (1 524 m)   Wt 87 1 kg (192 lb)   SpO2 97%   BMI 37 50 kg/m²          Physical Exam   Constitutional: She is oriented to person, place, and time  She appears well-developed and well-nourished   No distress  HENT:   Head: Normocephalic and atraumatic  Eyes: No scleral icterus  Neck: No JVD present  Carotid bruit is not present  No thyromegaly present  Cardiovascular: Normal rate and regular rhythm  Murmur heard  Systolic murmur is present with a grade of 2/6  Pulmonary/Chest: Effort normal and breath sounds normal  No respiratory distress  Abdominal:   obese   Musculoskeletal: She exhibits no edema  Lymphadenopathy:     She has no cervical adenopathy  Neurological: She is alert and oriented to person, place, and time  Skin: Skin is warm and dry  Psychiatric: She has a normal mood and affect  Her behavior is normal  Judgment and thought content normal    Vitals reviewed  BMI Counseling: Body mass index is 37 5 kg/m²  The BMI is above normal  Nutrition recommendations include moderation in carbohydrate intake and reducing intake of cholesterol  Exercise recommendations include exercising 3-5 times per week

## 2020-01-10 NOTE — ASSESSMENT & PLAN NOTE
Maintain cardiology follow up as planned  Med refills issued  Return in 3 months for next routine OV

## 2020-01-10 NOTE — PATIENT INSTRUCTIONS
Obesity   AMBULATORY CARE:   Obesity  is when your body mass index (BMI) is greater than 30  Your healthcare provider will use your height and weight to measure your BMI  The risks of obesity include  many health problems, such as injuries or physical disability  You may need tests to check for the following:  · Diabetes     · High blood pressure or high cholesterol     · Heart disease     · Gallbladder or liver disease     · Cancer of the colon, breast, prostate, liver, or kidney     · Sleep apnea     · Arthritis or gout  Seek care immediately if:   · You have a severe headache, confusion, or difficulty speaking  · You have weakness on one side of your body  · You have chest pain, sweating, or shortness of breath  Contact your healthcare provider if:   · You have symptoms of gallbladder or liver disease, such as pain in your upper abdomen  · You have knee or hip pain and discomfort while walking  · You have symptoms of diabetes, such as intense hunger and thirst, and frequent urination  · You have symptoms of sleep apnea, such as snoring or daytime sleepiness  · You have questions or concerns about your condition or care  Treatment for obesity  focuses on helping you lose weight to improve your health  Even a small decrease in BMI can reduce the risk for many health problems  Your healthcare provider will help you set a weight-loss goal   · Lifestyle changes  are the first step in treating obesity  These include making healthy food choices and getting regular physical activity  Your healthcare provider may suggest a weight-loss program that involves coaching, education, and therapy  · Medicine  may help you lose weight when it is used with a healthy diet and physical activity  · Surgery  can help you lose weight if you are very obese and have other health problems  There are several types of weight-loss surgery  Ask your healthcare provider for more information    Be successful losing weight:   · Set small, realistic goals  An example of a small goal is to walk for 20 minutes 5 days a week  Anther goal is to lose 5% of your body weight  · Tell friends, family members, and coworkers about your goals  and ask for their support  Ask a friend to lose weight with you, or join a weight-loss support group  · Identify foods or triggers that may cause you to overeat , and find ways to avoid them  Remove tempting high-calorie foods from your home and workplace  Place a bowl of fresh fruit on your kitchen counter  If stress causes you to eat, then find other ways to cope with stress  · Keep a diary to track what you eat and drink  Also write down how many minutes of physical activity you do each day  Weigh yourself once a week and record it in your diary  Eating changes: You will need to eat 500 to 1,000 fewer calories each day than you currently eat to lose 1 to 2 pounds a week  The following changes will help you cut calories:  · Eat smaller portions  Use small plates, no larger than 9 inches in diameter  Fill your plate half full of fruits and vegetables  Measure your food using measuring cups until you know what a serving size looks like  · Eat 3 meals and 1 or 2 snacks each day  Plan your meals in advance  Barby Dean and eat at home most of the time  Eat slowly  · Eat fruits and vegetables at every meal   They are low in calories and high in fiber, which makes you feel full  Do not add butter, margarine, or cream sauce to vegetables  Use herbs to season steamed vegetables  · Eat less fat and fewer fried foods  Eat more baked or grilled chicken and fish  These protein sources are lower in calories and fat than red meat  Limit fast food  Dress your salads with olive oil and vinegar instead of bottled dressing  · Limit the amount of sugar you eat  Do not drink sugary beverages  Limit alcohol  Activity changes:  Physical activity is good for your body in many ways   It helps you burn calories and build strong muscles  It decreases stress and depression, and improves your mood  It can also help you sleep better  Talk to your healthcare provider before you begin an exercise program   · Exercise for at least 30 minutes 5 days a week  Start slowly  Set aside time each day for physical activity that you enjoy and that is convenient for you  It is best to do both weight training and an activity that increases your heart rate, such as walking, bicycling, or swimming  · Find ways to be more active  Do yard work and housecleaning  Walk up the stairs instead of using elevators  Spend your leisure time going to events that require walking, such as outdoor festivals or fairs  This extra physical activity can help you lose weight and keep it off  Follow up with your healthcare provider as directed: You may need to meet with a dietitian  Write down your questions so you remember to ask them during your visits  © 2017 [employer] Information is for End User's use only and may not be sold, redistributed or otherwise used for commercial purposes  All illustrations and images included in CareNotes® are the copyrighted property of [employer]  or [employer]  The above information is an  only  It is not intended as medical advice for individual conditions or treatments  Talk to your doctor, nurse or pharmacist before following any medical regimen to see if it is safe and effective for you

## 2020-01-10 NOTE — ASSESSMENT & PLAN NOTE
Mood stable w/PHQ score of 5 on daily sertraline  Refill issued to continue same dose  Return in 3 months for next med check

## 2020-01-10 NOTE — TELEPHONE ENCOUNTER
PATIENT FORGOT TO ASK FOR A NOTE FOR WORK TO STATE THAT IT IS OK FOR HER TO HAVE A BOTTLE OF WATER WITH HER WHILE AT WORK - [employer] WHEN READY

## 2020-01-10 NOTE — PROGRESS NOTES
Assessment and Plan:     Problem List Items Addressed This Visit     None           Preventive health issues were discussed with patient, and age appropriate screening tests were ordered as noted in patient's After Visit Summary  Personalized health advice and appropriate referrals for health education or preventive services given if needed, as noted in patient's After Visit Summary       History of Present Illness:     Patient presents for Medicare Annual Wellness visit    Patient Care Team:  JEANCARLOS Beard as PCP - General (Family Medicine)     Problem List:     Patient Active Problem List   Diagnosis    Coronary artery disease involving native coronary artery of native heart without angina pectoris    S/P CABG x 2    Coronary artery disease involving coronary bypass graft of native heart without angina pectoris    S/P insertion of non-drug eluting coronary artery stent    Essential hypertension    Hypercholesteremia    Dyspnea on exertion      Past Medical and Surgical History:     Past Medical History:   Diagnosis Date    Circulation problem     Coronary artery disease     Depression     Diabetes ([address]     Glaucoma     Hearing problem     Heart attack ([address]     Heart failure ([address]     Heart valve problem     High blood pressure     High cholesterol     Open injury of heart     Sleep apnea      Past Surgical History:   Procedure Laterality Date    CORONARY ARTERY BYPASS GRAFT  2011    CORONARY STENT PLACEMENT  2010 January    CORONARY STENT PLACEMENT  2015 June    VAGINAL DELIVERY  1987    VAGINAL DELIVERY  1989      Family History:     Family History   Problem Relation Age of Onset    Hypertension Father     Coronary artery disease Father    Willa Rios Sudden death Father         cardiac 2014      Social History:     Social History     Socioeconomic History    Marital status:      Spouse name: None    Number of children: None    Years of education: None    Highest education level: None   Occupational History    None   Social Needs    Financial resource strain: None    Food insecurity:     Worry: None     Inability: None    Transportation needs:     Medical: None     Non-medical: None   Tobacco Use    Smoking status: Former Smoker    Smokeless tobacco: Never Used   Substance and Sexual Activity    Alcohol use: Yes     Comment: social    Drug use: No    Sexual activity: None   Lifestyle    Physical activity:     Days per week: None     Minutes per session: None    Stress: None   Relationships    Social connections:     Talks on phone: None     Gets together: None     Attends Congregation service: None     Active member of club or organization: None     Attends meetings of clubs or organizations: None     Relationship status: None    Intimate partner violence:     Fear of current or ex partner: None     Emotionally abused: None     Physically abused: None     Forced sexual activity: None   Other Topics Concern    None   Social History Narrative    None       Medications and Allergies:     Current Outpatient Medications   Medication Sig Dispense Refill    acetaminophen (TYLENOL) 500 mg tablet Take 1,000 mg by mouth every 6 (six) hours as needed      aspirin (ECOTRIN LOW STRENGTH) 81 mg EC tablet Take 81 mg by mouth daily      atorvastatin (LIPITOR) 80 mg tablet Take 80 mg by mouth daily      carvedilol (COREG) 6 25 mg tablet Take 6 25 mg by mouth 2 (two) times a day with meals       clopidogrel (PLAVIX) 75 mg tablet Take 75 mg by mouth daily      escitalopram (LEXAPRO) 5 mg tablet Take 5 mg by mouth daily      glipiZIDE (GLUCOTROL XL) 5 mg 24 hr tablet Take 5 mg by mouth daily      losartan-hydrochlorothiazide (HYZAAR) 50-12 5 mg per tablet Take 1 tablet by mouth daily      MELATONIN PO Take 12 mg by mouth daily at bedtime       zolpidem (AMBIEN) 10 mg tablet Take 10 mg by mouth daily as needed       No current facility-administered medications for this visit  Allergies   Allergen Reactions    Metformin     Penicillins       Immunizations: There is no immunization history on file for this patient  Health Maintenance:         Topic Date Due    Hepatitis C Screening  1965    MAMMOGRAM  1965    CRC Screening: Colonoscopy  1965    Cervical Cancer Screening  08/17/1986         Topic Date Due    Pneumococcal Vaccine: Pediatrics (0 to 5 Years) and At-Risk Patients (6 to 59 Years) (1 of 1 - PPSV23) 08/17/1971    DTaP,Tdap,and Td Vaccines (1 - Tdap) 08/17/1976    Hepatitis B Vaccine (1 of 3 - Risk 3-dose series) 08/17/1984    Influenza Vaccine  07/01/2019      Medicare Health Risk Assessment:     /68 (Patient Position: Sitting, Cuff Size: Standard)   Pulse 73   Temp (!) 97 1 °F (36 2 °C) (Tympanic)   Ht 5' (1 524 m)   Wt 87 1 kg (192 lb)   SpO2 97%   BMI 37 50 kg/m²      Ivelisse Bautista is here for her Initial Wellness visit  Health Risk Assessment:   Patient rates overall health as good  Patient feels that their physical health rating is same  Eyesight was rated as same  Hearing was rated as same  Patient feels that their emotional and mental health rating is same  Pain experienced in the last 7 days has been some  Patient's pain rating has been 8/10  Patient states that she has experienced no weight loss or gain in last 6 months  Depression Screening:   PHQ-2 Score: 4  PHQ-9 Score: 5      Fall Risk Screening: In the past year, patient has experienced: no history of falling in past year      Urinary Incontinence Screening:   Patient has not leaked urine accidently in the last six months  Home Safety:  Patient does not have trouble with stairs inside or outside of their home  Patient has working smoke alarms and has no working carbon monoxide detector  Home safety hazards include: none  Nutrition:   Current diet is Regular  Medications:   Patient is currently taking over-the-counter supplements   OTC medications include: see medication list  Patient is not able to manage medications  Activities of Daily Living (ADLs)/Instrumental Activities of Daily Living (IADLs):   Walk and transfer into and out of bed and chair?: Yes  Dress and groom yourself?: Yes    Bathe or shower yourself?: Yes    Feed yourself?  Yes  Do your laundry/housekeeping?: Yes  Manage your money, pay your bills and track your expenses?: Yes  Make your own meals?: Yes    Do your own shopping?: Yes    Previous Hospitalizations:   Any hospitalizations or ED visits within the last 12 months?: Yes    How many hospitalizations have you had in the last year?: 1-2    Advance Care Planning:   Living will: No    Durable POA for healthcare: No    Advanced directive: No    Five wishes given: Yes      PREVENTIVE SCREENINGS      Cardiovascular Screening:    General: Screening Not Indicated and History Lipid Disorder      Diabetes Screening:     General: Screening Current      JAENCARLOS Paiz

## 2020-01-10 NOTE — ASSESSMENT & PLAN NOTE
No results found for: HGBA1C     On daily glipizide for what pt reports as IFG  She has orders to complete A1C per cardiology and urged she do so ASAP  On daily ARB  Discuss eye and foot exams at next appt in 3 months

## 2020-01-10 NOTE — ASSESSMENT & PLAN NOTE
Stable BP control on current meds  Refills issued to continue same dose  Return in 3 months for next BP check

## 2020-01-17 DIAGNOSIS — M79.671 RIGHT FOOT PAIN: Primary | ICD-10-CM

## 2020-03-25 ENCOUNTER — TELEPHONE (OUTPATIENT)
Dept: CARDIOLOGY CLINIC | Facility: CLINIC | Age: 55
End: 2020-03-25

## 2020-03-25 ENCOUNTER — TELEPHONE (OUTPATIENT)
Dept: FAMILY MEDICINE CLINIC | Facility: HOSPITAL | Age: 55
End: 2020-03-25

## 2020-03-25 NOTE — TELEPHONE ENCOUNTER
Pt called, states she "self-quarantined" herself from work and is asking for a doctors note for work  Did not state start and end dates or any symptoms requiring confinement  Informed pt that as we did not request her to be out of work we most likely will not be able to give her a work note  Did req pt speak to her PCP in regard to this and any symptoms she may feel she is having in order to be screened for illness  Did let pt know I would send her req to the cardiologist, however he is out of office until next week and will not be able to address it until then  Pt verbalized understanding

## 2020-04-04 DIAGNOSIS — E11.65 TYPE 2 DIABETES MELLITUS WITH HYPERGLYCEMIA, WITHOUT LONG-TERM CURRENT USE OF INSULIN (HCC): ICD-10-CM

## 2020-04-04 RX ORDER — GLIPIZIDE 5 MG/1
TABLET, FILM COATED, EXTENDED RELEASE ORAL
Qty: 90 TABLET | Refills: 0 | Status: SHIPPED | OUTPATIENT
Start: 2020-04-04 | End: 2020-07-07

## 2020-04-08 DIAGNOSIS — I10 ESSENTIAL HYPERTENSION: Primary | ICD-10-CM

## 2020-04-10 RX ORDER — LOSARTAN POTASSIUM 50 MG/1
TABLET ORAL
Qty: 90 TABLET | Refills: 0 | Status: SHIPPED | OUTPATIENT
Start: 2020-04-10 | End: 2020-07-07

## 2020-04-10 RX ORDER — HYDROCHLOROTHIAZIDE 12.5 MG/1
TABLET ORAL
Qty: 90 TABLET | Refills: 0 | Status: SHIPPED | OUTPATIENT
Start: 2020-04-10 | End: 2020-06-02 | Stop reason: SDUPTHER

## 2020-04-15 DIAGNOSIS — I25.10 CORONARY ARTERY DISEASE INVOLVING NATIVE CORONARY ARTERY OF NATIVE HEART WITHOUT ANGINA PECTORIS: ICD-10-CM

## 2020-04-15 DIAGNOSIS — I10 ESSENTIAL HYPERTENSION: ICD-10-CM

## 2020-04-17 RX ORDER — CARVEDILOL 6.25 MG/1
TABLET ORAL
Qty: 180 TABLET | Refills: 0 | Status: SHIPPED | OUTPATIENT
Start: 2020-04-17 | End: 2020-07-07

## 2020-04-17 RX ORDER — ATORVASTATIN CALCIUM 80 MG/1
TABLET, FILM COATED ORAL
Qty: 90 TABLET | Refills: 0 | Status: SHIPPED | OUTPATIENT
Start: 2020-04-17 | End: 2020-07-07

## 2020-05-26 DIAGNOSIS — G47.00 INSOMNIA, UNSPECIFIED TYPE: ICD-10-CM

## 2020-05-29 RX ORDER — ZOLPIDEM TARTRATE 10 MG/1
TABLET ORAL
Qty: 90 TABLET | Refills: 0 | Status: SHIPPED | OUTPATIENT
Start: 2020-05-29 | End: 2020-08-21

## 2020-06-02 ENCOUNTER — OFFICE VISIT (OUTPATIENT)
Dept: FAMILY MEDICINE CLINIC | Facility: HOSPITAL | Age: 55
End: 2020-06-02
Payer: MEDICARE

## 2020-06-02 ENCOUNTER — APPOINTMENT (OUTPATIENT)
Dept: LAB | Facility: HOSPITAL | Age: 55
End: 2020-06-02
Attending: INTERNAL MEDICINE
Payer: MEDICARE

## 2020-06-02 VITALS
BODY MASS INDEX: 37.34 KG/M2 | SYSTOLIC BLOOD PRESSURE: 110 MMHG | OXYGEN SATURATION: 97 % | TEMPERATURE: 97.6 F | HEIGHT: 60 IN | DIASTOLIC BLOOD PRESSURE: 70 MMHG | HEART RATE: 58 BPM | WEIGHT: 190.2 LBS

## 2020-06-02 DIAGNOSIS — E11.65 TYPE 2 DIABETES MELLITUS WITH HYPERGLYCEMIA, WITHOUT LONG-TERM CURRENT USE OF INSULIN (HCC): ICD-10-CM

## 2020-06-02 DIAGNOSIS — F32.A DEPRESSION, UNSPECIFIED DEPRESSION TYPE: ICD-10-CM

## 2020-06-02 DIAGNOSIS — E78.5 HYPERLIPIDEMIA, UNSPECIFIED HYPERLIPIDEMIA TYPE: ICD-10-CM

## 2020-06-02 DIAGNOSIS — I10 ESSENTIAL HYPERTENSION: Primary | ICD-10-CM

## 2020-06-02 DIAGNOSIS — I25.810 CORONARY ARTERY DISEASE INVOLVING CORONARY BYPASS GRAFT OF NATIVE HEART WITHOUT ANGINA PECTORIS: ICD-10-CM

## 2020-06-02 DIAGNOSIS — E78.00 HYPERCHOLESTEREMIA: ICD-10-CM

## 2020-06-02 LAB
ALBUMIN SERPL BCP-MCNC: 3.8 G/DL (ref 3.5–5)
ALP SERPL-CCNC: 121 U/L (ref 46–116)
ALT SERPL W P-5'-P-CCNC: 41 U/L (ref 12–78)
ANION GAP SERPL CALCULATED.3IONS-SCNC: 6 MMOL/L (ref 4–13)
AST SERPL W P-5'-P-CCNC: 22 U/L (ref 5–45)
BILIRUB SERPL-MCNC: 0.53 MG/DL (ref 0.2–1)
BUN SERPL-MCNC: 18 MG/DL (ref 5–25)
CALCIUM SERPL-MCNC: 8.9 MG/DL (ref 8.3–10.1)
CHLORIDE SERPL-SCNC: 104 MMOL/L (ref 100–108)
CHOLEST SERPL-MCNC: 181 MG/DL (ref 50–200)
CO2 SERPL-SCNC: 27 MMOL/L (ref 21–32)
CREAT SERPL-MCNC: 0.81 MG/DL (ref 0.6–1.3)
EST. AVERAGE GLUCOSE BLD GHB EST-MCNC: 157 MG/DL
GFR SERPL CREATININE-BSD FRML MDRD: 83 ML/MIN/1.73SQ M
GLUCOSE P FAST SERPL-MCNC: 140 MG/DL (ref 65–99)
HBA1C MFR BLD: 7.1 %
HDLC SERPL-MCNC: 40 MG/DL
LDLC SERPL CALC-MCNC: 96 MG/DL (ref 0–100)
POTASSIUM SERPL-SCNC: 3.7 MMOL/L (ref 3.5–5.3)
PROT SERPL-MCNC: 8.1 G/DL (ref 6.4–8.2)
SODIUM SERPL-SCNC: 137 MMOL/L (ref 136–145)
TRIGL SERPL-MCNC: 223 MG/DL
TSH SERPL DL<=0.05 MIU/L-ACNC: 2.01 UIU/ML (ref 0.36–3.74)

## 2020-06-02 PROCEDURE — 3074F SYST BP LT 130 MM HG: CPT | Performed by: NURSE PRACTITIONER

## 2020-06-02 PROCEDURE — 80061 LIPID PANEL: CPT

## 2020-06-02 PROCEDURE — 3008F BODY MASS INDEX DOCD: CPT | Performed by: NURSE PRACTITIONER

## 2020-06-02 PROCEDURE — 84443 ASSAY THYROID STIM HORMONE: CPT | Performed by: INTERNAL MEDICINE

## 2020-06-02 PROCEDURE — 99214 OFFICE O/P EST MOD 30 MIN: CPT | Performed by: NURSE PRACTITIONER

## 2020-06-02 PROCEDURE — 83036 HEMOGLOBIN GLYCOSYLATED A1C: CPT | Performed by: INTERNAL MEDICINE

## 2020-06-02 PROCEDURE — 80053 COMPREHEN METABOLIC PANEL: CPT | Performed by: INTERNAL MEDICINE

## 2020-06-02 PROCEDURE — 36415 COLL VENOUS BLD VENIPUNCTURE: CPT | Performed by: INTERNAL MEDICINE

## 2020-06-02 PROCEDURE — 1036F TOBACCO NON-USER: CPT | Performed by: NURSE PRACTITIONER

## 2020-06-02 PROCEDURE — 3078F DIAST BP <80 MM HG: CPT | Performed by: NURSE PRACTITIONER

## 2020-06-02 RX ORDER — HYDROCHLOROTHIAZIDE 25 MG/1
25 TABLET ORAL DAILY
Qty: 90 TABLET | Refills: 1 | Status: SHIPPED | OUTPATIENT
Start: 2020-06-02 | End: 2020-08-21 | Stop reason: SDUPTHER

## 2020-07-07 DIAGNOSIS — I10 ESSENTIAL HYPERTENSION: ICD-10-CM

## 2020-07-07 DIAGNOSIS — I25.10 CORONARY ARTERY DISEASE INVOLVING NATIVE CORONARY ARTERY OF NATIVE HEART WITHOUT ANGINA PECTORIS: ICD-10-CM

## 2020-07-07 DIAGNOSIS — E11.65 TYPE 2 DIABETES MELLITUS WITH HYPERGLYCEMIA, WITHOUT LONG-TERM CURRENT USE OF INSULIN (HCC): ICD-10-CM

## 2020-07-07 RX ORDER — HYDROCHLOROTHIAZIDE 12.5 MG/1
TABLET ORAL
Qty: 90 TABLET | Refills: 0 | Status: SHIPPED | OUTPATIENT
Start: 2020-07-07 | End: 2020-09-08 | Stop reason: ALTCHOICE

## 2020-07-07 RX ORDER — ATORVASTATIN CALCIUM 80 MG/1
TABLET, FILM COATED ORAL
Qty: 90 TABLET | Refills: 0 | Status: SHIPPED | OUTPATIENT
Start: 2020-07-07 | End: 2020-12-29

## 2020-07-07 RX ORDER — LOSARTAN POTASSIUM 50 MG/1
TABLET ORAL
Qty: 90 TABLET | Refills: 0 | Status: SHIPPED | OUTPATIENT
Start: 2020-07-07 | End: 2020-10-05

## 2020-07-07 RX ORDER — GLIPIZIDE 5 MG/1
TABLET, FILM COATED, EXTENDED RELEASE ORAL
Qty: 90 TABLET | Refills: 0 | Status: SHIPPED | OUTPATIENT
Start: 2020-07-07 | End: 2020-10-11

## 2020-07-07 RX ORDER — CARVEDILOL 6.25 MG/1
TABLET ORAL
Qty: 180 TABLET | Refills: 0 | Status: SHIPPED | OUTPATIENT
Start: 2020-07-07 | End: 2020-09-08

## 2020-08-18 DIAGNOSIS — I10 ESSENTIAL HYPERTENSION: ICD-10-CM

## 2020-08-18 DIAGNOSIS — F32.A DEPRESSION, UNSPECIFIED DEPRESSION TYPE: ICD-10-CM

## 2020-08-21 DIAGNOSIS — G47.00 INSOMNIA, UNSPECIFIED TYPE: ICD-10-CM

## 2020-08-21 RX ORDER — HYDROCHLOROTHIAZIDE 25 MG/1
25 TABLET ORAL 2 TIMES DAILY
Qty: 60 TABLET | Refills: 1 | Status: SHIPPED | OUTPATIENT
Start: 2020-08-21 | End: 2020-09-08

## 2020-08-21 RX ORDER — ZOLPIDEM TARTRATE 10 MG/1
TABLET ORAL
Qty: 90 TABLET | Refills: 0 | Status: SHIPPED | OUTPATIENT
Start: 2020-08-21 | End: 2020-12-07

## 2020-08-21 RX ORDER — ESCITALOPRAM OXALATE 5 MG/1
5 TABLET ORAL 2 TIMES DAILY
Qty: 60 TABLET | Refills: 1 | Status: SHIPPED | OUTPATIENT
Start: 2020-08-21 | End: 2020-10-06 | Stop reason: SDUPTHER

## 2020-08-21 NOTE — TELEPHONE ENCOUNTER
Please call pt and see if she is taking lexapro once or twice daily  I will then issue refill after clarification

## 2020-08-21 NOTE — TELEPHONE ENCOUNTER
Patient takes escitalopram two times a day  Also, she said she has been taking the hydochlorothiazide 25mg twice a day due to increased edema in her legs  She said the swelling is better but not resolved and she is urinating a lot  She wants to know if you think she should continue on this dose or decrease it

## 2020-08-21 NOTE — TELEPHONE ENCOUNTER
Advise she keep HCTZ dose the same (can't dose higher and shouldn't decrease if she is noting fluid retention)  Advise she stay hydrated w/water and limit salty foods  Cardiology may address further at her appt next month

## 2020-09-08 ENCOUNTER — OFFICE VISIT (OUTPATIENT)
Dept: CARDIOLOGY CLINIC | Facility: CLINIC | Age: 55
End: 2020-09-08
Payer: MEDICARE

## 2020-09-08 VITALS
SYSTOLIC BLOOD PRESSURE: 106 MMHG | HEART RATE: 54 BPM | BODY MASS INDEX: 36.91 KG/M2 | WEIGHT: 188 LBS | DIASTOLIC BLOOD PRESSURE: 62 MMHG | HEIGHT: 60 IN | TEMPERATURE: 97 F

## 2020-09-08 DIAGNOSIS — I10 ESSENTIAL HYPERTENSION: ICD-10-CM

## 2020-09-08 DIAGNOSIS — I25.810 CORONARY ARTERY DISEASE INVOLVING CORONARY BYPASS GRAFT OF NATIVE HEART WITHOUT ANGINA PECTORIS: ICD-10-CM

## 2020-09-08 DIAGNOSIS — Z95.1 S/P CABG X 2: ICD-10-CM

## 2020-09-08 DIAGNOSIS — I25.10 CORONARY ARTERY DISEASE INVOLVING NATIVE CORONARY ARTERY OF NATIVE HEART WITHOUT ANGINA PECTORIS: Primary | ICD-10-CM

## 2020-09-08 DIAGNOSIS — G47.37 CENTRAL SLEEP APNEA DUE TO MEDICAL CONDITION: ICD-10-CM

## 2020-09-08 DIAGNOSIS — Z95.5 S/P INSERTION OF NON-DRUG ELUTING CORONARY ARTERY STENT: ICD-10-CM

## 2020-09-08 DIAGNOSIS — E78.2 MIXED HYPERLIPIDEMIA: ICD-10-CM

## 2020-09-08 PROCEDURE — 99214 OFFICE O/P EST MOD 30 MIN: CPT | Performed by: INTERNAL MEDICINE

## 2020-09-08 PROCEDURE — 93000 ELECTROCARDIOGRAM COMPLETE: CPT | Performed by: INTERNAL MEDICINE

## 2020-09-08 NOTE — PROGRESS NOTES
Cardiology Follow-up    Ismael Marie  [id number]  1965  [address] [facility] Latonia Chung  [address] [place] [id number] [phone]    1  Coronary artery disease involving native coronary artery of native heart without angina pectoris  POCT ECG    metoprolol tartrate (LOPRESSOR) 25 mg tablet   2  S/P CABG x 2     3  Coronary artery disease involving coronary bypass graft of native heart without angina pectoris     4  S/P insertion of non-drug eluting coronary artery stent     5  Mixed hyperlipidemia     6  Essential hypertension  metoprolol tartrate (LOPRESSOR) 25 mg tablet   7  Central sleep apnea due to medical condition  Ambulatory referral to Pulmonology       HPI:    Mrs Lucy Vaughn comes in for follow-up given her cardiac history  Vannessa Nicole had coronary artery disease diagnosed back in 2011  In January 2011 she had a drug-eluting stent placed to the left circumflex  Then in 2012 she had CABG x2 with a LIMA to the LAD and a SVG to RCA  She had this work done at [facility]   She was then following with [facility], in 2015 had a cardiac catheterization that showed critical stenosis in the distal portion of her vein graft to her RCA  She received a drug-eluting stent to this region  She has normal LV systolic function without valve disease by echocardiogram earlier this year  She does have obstructive sleep apnea, in which she intermittently uses CPAP  At our initial consultation 6 months ago Vannessa Nicole was noticing some increasing fatigue, shortness of breath and exercise intolerance  She was off CPAP as she had moved from [place] and had not on packed it as of yet  We did updated testing that included a stress nuclear study and echocardiogram   Echocardiogram showed normal LV systolic function without significant valve disease    Stress nuclear study showed a fixed inferior defect, either representing an old infarct versus attenuation artifact  No ischemia seen  Trena Murcia has felt better from a shortness of breath standpoint, since getting back on CPAP  She feels her CPAP mask is not working as well as a can, and wanted to go back to sleep medicine  She has been getting some intermittent palpitations  She states that this has occurred 4 times since I last saw her  They are random, and last a few minutes and just go away on their own  It last occurred about 2 months ago  She will notice some shortness of breath at times with this  No chest pain or any symptoms of angina  She does have some mild intermittent lower extremity edema  She denies orthopnea, PND or any other signs of CHF  No lightheadedness or syncope        Patient Active Problem List   Diagnosis    Coronary artery disease involving native coronary artery of native heart without angina pectoris    S/P CABG x 2    Coronary artery disease involving coronary bypass graft of native heart without angina pectoris    S/P insertion of non-drug eluting coronary artery stent    Hypertension    Hyperlipidemia    Dyspnea on exertion    Sleep apnea    DM (di[address] mellitus) [address] [address]    BMI 37 0-37 9, adult    Insomnia    Depression     Past Medical History:   Diagnosis Date    Circulation problem     Coronary artery disease     Depression     Diabetes ([facility]     Glaucoma     Hearing problem     Heart attack ([facility]     Heart failure ([facility]     Heart valve problem     High blood pressure     High cholesterol     Open injury of heart     Sleep apnea      Social History     Socioeconomic History    Marital status:      Spouse name: Not on file    Number of children: Not on file    Years of education: Not on file    Highest education level: Not on file   Occupational History    Not on file   Social Needs    Financial resource strain: Not on file    Food insecurity     Worry: Not on file     Inability: Not on file   [facility] Transportation needs     Medical: Not on file     Non-medical: Not on file   Tobacco Use    Smoking status: Former Smoker    Smokeless tobacco: Never Used   Substance and Sexual Activity    Alcohol use: Yes     Comment: social    Drug use: Yes     Types: Marijuana    Sexual activity: Not on file   Lifestyle    Physical activity     Days per week: Not on file     Minutes per session: Not on file    Stress: Not on file   Relationships    Social connections     Talks on phone: Not on file     Gets together: Not on file     Attends Denominational service: Not on file     Active member of club or organization: Not on file     Attends meetings of clubs or organizations: Not on file     Relationship status: Not on file    Intimate partner violence     Fear of current or ex partner: Not on file     Emotionally abused: Not on file     Physically abused: Not on file     Forced sexual activity: Not on file   Other Topics Concern    Not on file   Social History Narrative    Not on file      Family History   Problem Relation Age of Onset    Hypertension Father     Coronary artery disease Father     Sudden death Father         cardiac 2014     Past Surgical History:   Procedure Laterality Date    CORONARY ARTERY BYPASS GRAFT  2011    CORONARY STENT PLACEMENT  2010 January    CORONARY STENT PLACEMENT  2015 June    [address]       Current Outpatient Medications:     aspirin (ECOTRIN LOW STRENGTH) 81 mg EC tablet, Take 81 mg by mouth daily, Disp: , Rfl:     atorvastatin (LIPITOR) 80 mg tablet, take 1 tablet by mouth daily, Disp: 90 tablet, Rfl: 0    clopidogrel (PLAVIX) 75 mg tablet, Take 1 tablet (75 mg total) by mouth daily, Disp: 90 tablet, Rfl: 0    escitalopram (LEXAPRO) 5 mg tablet, Take 1 tablet (5 mg total) by mouth 2 (two) times a day, Disp: 60 tablet, Rfl: 1    glipiZIDE (GLUCOTROL XL) 5 mg 24 hr tablet, take 1 tablet by mouth daily, Disp: 90 tablet, Rfl: 0   losartan (COZAAR) 50 mg tablet, take 1 tablet by mouth daily, Disp: 90 tablet, Rfl: 0    MELATONIN PO, Take 12 mg by mouth daily at bedtime , Disp: , Rfl:     zolpidem (AMBIEN) 10 mg tablet, take 1 tablet by mouth daily if needed for sleep, Disp: 90 tablet, Rfl: 0    acetaminophen (TYLENOL) 500 mg tablet, Take 1,000 mg by mouth every 6 (six) hours as needed, Disp: , Rfl:     metoprolol tartrate (LOPRESSOR) 25 mg tablet, Take 1 tablet (25 mg total) by mouth every 12 (twelve) hours, Disp: 60 tablet, Rfl: 3  Allergies   Allergen Reactions    Metformin     Penicillins      Vitals:    09/08/20 1010   BP: 106/62   BP Location: Left arm   Patient Position: Sitting   Cuff Size: Standard   Pulse: (!) 54   Temp: (!) 97 °F (36 1 °C)   Weight: 85 3 kg (188 lb)   Height: 5' (1 524 m)       Labs:  Lab Results   Component Value Date    K 3 7 06/02/2020     06/02/2020    CO2 27 06/02/2020    BUN 18 06/02/2020    CREATININE 0 81 06/02/2020    CALCIUM 8 9 06/02/2020     Lab Results   Component Value Date    WBC 7 17 01/21/2019    HGB 14 9 01/21/2019    HCT 45 3 01/21/2019    MCV 90 01/21/2019     01/21/2019       Imaging:  ECG today shows sinus bradycardia, rightward axis with nonspecific ST and T-wave changes  STRESS NUCLEAR (11/2019):  SUMMARY:  -  Stress results: Duration of exercise was 6 min and 45 sec  Target heart rate was not achieved  The patient experienced chest pain during stress; pain resolved spontaneously  -  ECG conclusions: The stress ECG was negative for ischemia and normal   -  Perfusion imaging: There was a small to moderate, moderately severe, fixed myocardial perfusion defect of the inferolateral wall  -  Gated SPECT: The calculated left ventricular ejection fraction was 67 %  Left ventricular ejection fraction was within normal limits by visual estimate   There was no left ventricular regional abnormality      IMPRESSIONS: Abnormal study after maximal exercise without reproduction of symptoms  There was a small infarct in the inferolateral region  Left ventricular systolic function was normal       ECHO (11/2019):  LEFT VENTRICLE:  Systolic function was normal by visual assessment  Ejection fraction was estimated to be 60 %  Although no diagnostic regional wall motion abnormality was identified, this possibility cannot be completely excluded on the basis of this study      LEFT ATRIUM:  The atrium was mildly dilated      MITRAL VALVE:  There was mild regurgitation      TRICUSPID VALVE:  There was mild regurgitation      PULMONIC VALVE:  There was trace regurgitation  Review of Systems:  Review of Systems   Constitutional: Positive for fatigue  HENT: Negative  Eyes: Negative  Respiratory: Positive for shortness of breath  Cardiovascular: Positive for palpitations and leg swelling  Gastrointestinal: Negative  Musculoskeletal: Negative  Skin: Negative  Allergic/Immunologic: Negative  Neurological: Negative  Hematological: Negative  Psychiatric/Behavioral: Negative  All other systems reviewed and are negative  Vitals:    09/08/20 1010   BP: 106/62   BP Location: Left arm   Patient Position: Sitting   Cuff Size: Standard   Pulse: (!) 54   Temp: (!) 97 °F (36 1 °C)   Weight: 85 3 kg (188 lb)   Height: 5' (1 524 m)     Physical Exam:  Physical Exam  Vitals signs and nursing note reviewed  Constitutional:       Appearance: She is well-developed  HENT:      Head: Normocephalic and atraumatic  Eyes:      General: No scleral icterus  Right eye: No discharge  Left eye: No discharge  Pupils: Pupils are equal, round, and reactive to light  Neck:      Musculoskeletal: Normal range of motion and neck supple  Thyroid: No thyromegaly  Vascular: No JVD  Cardiovascular:      Rate and Rhythm: Normal rate and regular rhythm  No extrasystoles are present  Pulses: Normal pulses  No decreased pulses        Heart sounds: Normal heart sounds, S1 normal and S2 normal  No murmur  No friction rub  No gallop  Pulmonary:      Effort: Pulmonary effort is normal  No respiratory distress  Breath sounds: Normal breath sounds  No wheezing or rales  Abdominal:      General: Bowel sounds are normal  There is no distension  Palpations: Abdomen is soft  Tenderness: There is no abdominal tenderness  Musculoskeletal: Normal range of motion  General: No tenderness or deformity  Left lower leg: Edema present  Skin:     General: Skin is warm and dry  Findings: No rash  Neurological:      Mental Status: She is alert and oriented to person, place, and time  Cranial Nerves: No cranial nerve deficit  Psychiatric:         Thought Content: Thought content normal          Judgment: Judgment normal          Discussion/Summary:    1  Palpitations - These have only a cord 4 times in the last 6 months  They are random, and she seems to be describing intermittent PVCs/PACs  I am going to change carvedilol over to metoprolol to see if this helps her symptoms  If these accelerate we will have her wear a monitor  2   CAD - Michael Santo had drug-eluting stent placed to her left circumflex in 2011, CABG x2 in 2012, and then a drug-eluting stent placed to her SVG to RCA graft in 2015  She has done well since  We did updated testing back in November which was for the most part unremarkable  She has a fixed inferior defect, that likely could be from diaphragmatic attenuation artifact or a small scar  No ischemia seen  She has normal LV systolic function  No testing is needed at this time  We will see her back in 6 months  3   Hypertension - Her blood pressure has been running a bit on the low side  As stated we are changing carvedilol over to metoprolol  I am also going to have her stop the hydrochlorothiazide  It is encouraged for her to periodically check her blood pressure at home      4   Hypercholesterolemia - She will remain on atorvastatin 80 mg daily  She has blood work followed periodically  Her last LDL was 96      5   GABI - She went back on CPAP around our last visit, and she is intermittently on it now  She feels as if she needs some adjustment with this  I referred her to Pulmonary/Sleep Medicine  Counseling / Coordination of Care  Total office time spent today 25 minutes  Greater than 50% of total time was spent with the patient and / or family counseling and / or coordination of care

## 2020-09-08 NOTE — PATIENT INSTRUCTIONS
Low-Sodium Diet   AMBULATORY CARE:   A low-sodium diet  limits foods that are high in sodium (salt)  You will need to follow a low-sodium diet if you have high blood pressure, kidney disease, or heart failure  You may also need to follow this diet if you have a condition that is causing your body to retain (hold) extra fluid  You may need to limit the amount of sodium you eat to 1,500 mg  Ask your healthcare provider how much sodium you can have each day  How to use food labels to choose foods that are low in sodium:  Read food labels to find the amount of sodium they contain  The amount of sodium is listed in milligrams (mg)  The % Daily Value (DV) column tells you how much of your daily needs are met by 1 serving of the food for each nutrient listed  Choose foods that have less than 5% of the DV of sodium  These foods are considered low in sodium  Foods that have 20% or more of the DV of sodium are considered high in sodium  Some food labels may also list any of the following terms that tell you about the sodium content in the food:  · Sodium-free:  Less than 5 mg in each serving    · Very low sodium:  35 mg of sodium or less in each serving    · Low sodium:  140 mg of sodium or less in each serving    · Reduced sodium: At least 25% less sodium in each serving than the regular type    · Light in sodium:  50% less sodium in each serving    · Unsalted or no added salt:  No extra salt is added during processing (the food may still contain sodium)  Foods to avoid:  Salty foods are high in sodium   You should avoid the following:  · Processed foods:      ¨ Mixes for cornbread, biscuits, cake, and pudding     ¨ Instant foods, such as potatoes, cereals, noodles, and rice     ¨ Packaged foods, such as bread stuffing, rice and pasta mixes, snack dip mixes, and macaroni and cheese     ¨ Canned foods, such as canned vegetables, soups, broths, sauces, and vegetable or tomato juice    ¨ Snack foods, such as salted chips, popcorn, pretzels, pork rinds, salted crackers, and salted nuts    ¨ Frozen foods, such as dinners, entrees, vegetables with sauces, and breaded meats    ¨ Sauerkraut, pickled vegetables, and other foods prepared in brine    · Meats and cheeses:      ¨ Smoked or cured meat, such as corned beef, taylor, ham, hot dogs, and sausage    ¨ Canned meats or spreads, such as potted meats, sardines, anchovies, and imitation seafood    ¨ Deli or lunch meats, such as bologna, ham, turkey, and roast beef    ¨ Processed cheese, such as American cheese and cheese spreads    · Condiments, sauces, and seasonings:      ¨ Salt (¼ teaspoon of salt contains 575 mg of sodium)    ¨ Seasonings made with salt, such as garlic salt, celery salt, onion salt, and seasoned salt    ¨ Regular soy sauce, barbecue sauce, teriyaki sauce, steak sauce, Worcestershire sauce, and most flavored vinegars    ¨ Canned gravy and mixes     ¨ Regular condiments, such as mustard, ketchup, and salad dressings    ¨ Pickles and olives    ¨ Meat tenderizers and monosodium glutamate (MSG)  Foods to include:  Read the food label to find the exact amount of sodium in each serving  · Bread and cereal:  Try to choose breads with less than 80 mg of sodium per serving  ¨ Bread, roll, aly, tortilla, or unsalted crackers  ¨ Ready-to-eat cereals with less than 5% DV of sodium (examples include shredded wheat and puffed rice)    ¨ Pasta    · Vegetables and fruits:      ¨ Unsalted fresh, frozen, or canned vegetables    ¨ Fresh, frozen, or canned fruits    ¨ Fruit juice    · Dairy:  One serving has about 150 mg of sodium  ¨ Milk, all types    ¨ Yogurt    ¨ Hard cheese, such as cheddar, Swiss, [employer], or mozzarella    · Meat and other protein foods:  Some raw meats may have added sodium       ¨ Plain meats, fish, and poultry     ¨ Eggs    · Other foods:      ¨ Homemade pudding    ¨ Unsalted nuts, popcorn, or pretzels    ¨ Unsalted butter or margarine  Ways to decrease sodium:   · Add spices and herbs to foods instead of salt during cooking  Use salt-free seasonings to add flavor to foods  Examples include onion powder, garlic powder, basil, sierra powder, paprika, and parsley  Try lemon or lime juice or vinegar to give foods a tart flavor  Use hot peppers, pepper, or cayenne pepper to add a spicy flavor to foods  · Do not keep a salt shaker at your kitchen table  This may help keep you from adding salt to food at the table  It may take time to get used to enjoying the natural flavor of food instead of adding salt  Talk to your healthcare provider before you use salt substitutes  Some salt substitutes have a high amount of potassium and need to be avoided if you have kidney disease  · Choose low-sodium foods at restaurants  Meals from restaurants are often high in sodium  Some restaurants have nutrition information on the menu that tells you the amount of sodium in their foods  If possible, ask for your food to be prepared with less, or no salt  · Shop for unsalted or low-sodium foods and snacks at the grocery store  Examples include unsalted or low-sodium broths, soups, and canned vegetables  Choose fresh or frozen vegetables instead  Choose unsalted nuts or seeds or fresh fruits or vegetables as snacks  Read food labels and choose salt-free, very low-sodium, or low-sodium foods  © 2017 [address] Information is for End User's use only and may not be sold, redistributed or otherwise used for commercial purposes  All illustrations and images included in CareNotes® are the copyrighted property of [employer]  or Catrach[employer]  The above information is an  only  It is not intended as medical advice for individual conditions or treatments  Talk to your doctor, nurse or pharmacist before following any medical regimen to see if it is safe and effective for you

## 2020-09-28 DIAGNOSIS — I10 ESSENTIAL HYPERTENSION: ICD-10-CM

## 2020-09-29 RX ORDER — CARVEDILOL 6.25 MG/1
TABLET ORAL
Qty: 180 TABLET | Refills: 0 | OUTPATIENT
Start: 2020-09-29

## 2020-10-04 DIAGNOSIS — I10 ESSENTIAL HYPERTENSION: ICD-10-CM

## 2020-10-05 ENCOUNTER — TELEPHONE (OUTPATIENT)
Dept: CARDIOLOGY CLINIC | Facility: CLINIC | Age: 55
End: 2020-10-05

## 2020-10-05 RX ORDER — LOSARTAN POTASSIUM 50 MG/1
TABLET ORAL
Qty: 30 TABLET | Refills: 0 | Status: SHIPPED | OUTPATIENT
Start: 2020-10-05 | End: 2020-11-02

## 2020-10-06 ENCOUNTER — OFFICE VISIT (OUTPATIENT)
Dept: FAMILY MEDICINE CLINIC | Facility: HOSPITAL | Age: 55
End: 2020-10-06
Payer: MEDICARE

## 2020-10-06 VITALS
TEMPERATURE: 96.6 F | HEIGHT: 60 IN | BODY MASS INDEX: 38.05 KG/M2 | OXYGEN SATURATION: 96 % | DIASTOLIC BLOOD PRESSURE: 70 MMHG | HEART RATE: 65 BPM | WEIGHT: 193.8 LBS | SYSTOLIC BLOOD PRESSURE: 112 MMHG

## 2020-10-06 DIAGNOSIS — I10 ESSENTIAL HYPERTENSION: Primary | ICD-10-CM

## 2020-10-06 DIAGNOSIS — G47.37 CENTRAL SLEEP APNEA DUE TO MEDICAL CONDITION: ICD-10-CM

## 2020-10-06 DIAGNOSIS — E11.65 TYPE 2 DIABETES MELLITUS WITH HYPERGLYCEMIA, WITHOUT LONG-TERM CURRENT USE OF INSULIN (HCC): ICD-10-CM

## 2020-10-06 DIAGNOSIS — F32.A DEPRESSION, UNSPECIFIED DEPRESSION TYPE: ICD-10-CM

## 2020-10-06 DIAGNOSIS — E78.5 HYPERLIPIDEMIA, UNSPECIFIED HYPERLIPIDEMIA TYPE: ICD-10-CM

## 2020-10-06 PROCEDURE — 99214 OFFICE O/P EST MOD 30 MIN: CPT | Performed by: NURSE PRACTITIONER

## 2020-10-06 RX ORDER — GLIMEPIRIDE 2 MG/1
2 TABLET ORAL
Qty: 30 TABLET | Refills: 2 | Status: SHIPPED | OUTPATIENT
Start: 2020-10-06 | End: 2021-01-25

## 2020-10-06 RX ORDER — ESCITALOPRAM OXALATE 10 MG/1
TABLET ORAL
Qty: 60 TABLET | Refills: 2 | Status: SHIPPED | OUTPATIENT
Start: 2020-10-06 | End: 2021-01-18

## 2020-10-07 DIAGNOSIS — I10 ESSENTIAL HYPERTENSION: Primary | ICD-10-CM

## 2020-10-08 RX ORDER — CARVEDILOL 6.25 MG/1
6.25 TABLET ORAL 2 TIMES DAILY WITH MEALS
Qty: 180 TABLET | Refills: 3 | Status: SHIPPED | OUTPATIENT
Start: 2020-10-08 | End: 2021-09-15

## 2020-10-08 RX ORDER — HYDROCHLOROTHIAZIDE 12.5 MG/1
12.5 TABLET ORAL DAILY
Qty: 90 TABLET | Refills: 1 | Status: SHIPPED | OUTPATIENT
Start: 2020-10-08 | End: 2021-03-23

## 2020-10-11 DIAGNOSIS — F32.A DEPRESSION, UNSPECIFIED DEPRESSION TYPE: ICD-10-CM

## 2020-10-11 DIAGNOSIS — E11.65 TYPE 2 DIABETES MELLITUS WITH HYPERGLYCEMIA, WITHOUT LONG-TERM CURRENT USE OF INSULIN (HCC): ICD-10-CM

## 2020-10-11 RX ORDER — ESCITALOPRAM OXALATE 5 MG/1
TABLET ORAL
Qty: 60 TABLET | Refills: 1 | Status: SHIPPED | OUTPATIENT
Start: 2020-10-11 | End: 2021-01-25

## 2020-10-11 RX ORDER — GLIPIZIDE 5 MG/1
TABLET, FILM COATED, EXTENDED RELEASE ORAL
Qty: 90 TABLET | Refills: 0 | Status: SHIPPED | OUTPATIENT
Start: 2020-10-11 | End: 2021-01-02

## 2020-10-12 ENCOUNTER — TELEPHONE (OUTPATIENT)
Dept: FAMILY MEDICINE CLINIC | Facility: HOSPITAL | Age: 55
End: 2020-10-12

## 2020-11-02 DIAGNOSIS — I10 ESSENTIAL HYPERTENSION: ICD-10-CM

## 2020-11-02 RX ORDER — LOSARTAN POTASSIUM 50 MG/1
TABLET ORAL
Qty: 30 TABLET | Refills: 3 | Status: SHIPPED | OUTPATIENT
Start: 2020-11-02 | End: 2020-11-09

## 2020-11-09 DIAGNOSIS — G47.00 INSOMNIA, UNSPECIFIED TYPE: ICD-10-CM

## 2020-11-09 DIAGNOSIS — I10 ESSENTIAL HYPERTENSION: ICD-10-CM

## 2020-11-09 DIAGNOSIS — I25.10 CORONARY ARTERY DISEASE INVOLVING NATIVE CORONARY ARTERY OF NATIVE HEART WITHOUT ANGINA PECTORIS: ICD-10-CM

## 2020-11-09 RX ORDER — LOSARTAN POTASSIUM 50 MG/1
TABLET ORAL
Qty: 90 TABLET | Refills: 0 | Status: SHIPPED | OUTPATIENT
Start: 2020-11-09 | End: 2021-05-24

## 2020-11-09 RX ORDER — CLOPIDOGREL BISULFATE 75 MG/1
TABLET ORAL
Qty: 90 TABLET | Refills: 0 | Status: SHIPPED | OUTPATIENT
Start: 2020-11-09 | End: 2021-02-03

## 2020-11-09 RX ORDER — ZOLPIDEM TARTRATE 10 MG/1
TABLET ORAL
Qty: 90 TABLET | OUTPATIENT
Start: 2020-11-09

## 2020-12-07 DIAGNOSIS — G47.00 INSOMNIA, UNSPECIFIED TYPE: ICD-10-CM

## 2020-12-07 RX ORDER — ZOLPIDEM TARTRATE 10 MG/1
TABLET ORAL
Qty: 90 TABLET | Refills: 0 | Status: SHIPPED | OUTPATIENT
Start: 2020-12-07 | End: 2021-03-26

## 2020-12-16 DIAGNOSIS — I10 ESSENTIAL HYPERTENSION: ICD-10-CM

## 2020-12-17 RX ORDER — HYDROCHLOROTHIAZIDE 25 MG/1
TABLET ORAL
Qty: 90 TABLET | Refills: 1 | OUTPATIENT
Start: 2020-12-17

## 2020-12-29 DIAGNOSIS — I25.10 CORONARY ARTERY DISEASE INVOLVING NATIVE CORONARY ARTERY OF NATIVE HEART WITHOUT ANGINA PECTORIS: ICD-10-CM

## 2020-12-29 RX ORDER — ATORVASTATIN CALCIUM 80 MG/1
TABLET, FILM COATED ORAL
Qty: 30 TABLET | Refills: 0 | Status: SHIPPED | OUTPATIENT
Start: 2020-12-29 | End: 2021-01-22

## 2020-12-29 NOTE — TELEPHONE ENCOUNTER
She is due for blood work and [address] in early January  Please call her to remind and schedule, and I will issue 1 month refill

## 2020-12-29 NOTE — TELEPHONE ENCOUNTER
Spoke to pt, states she does not know her January schedule yet and will call next week when she does

## 2020-12-31 DIAGNOSIS — E11.65 TYPE 2 DIABETES MELLITUS WITH HYPERGLYCEMIA, WITHOUT LONG-TERM CURRENT USE OF INSULIN (HCC): ICD-10-CM

## 2021-01-02 RX ORDER — GLIPIZIDE 5 MG/1
TABLET, FILM COATED, EXTENDED RELEASE ORAL
Qty: 30 TABLET | Refills: 0 | Status: SHIPPED | OUTPATIENT
Start: 2021-01-02 | End: 2021-01-30

## 2021-01-02 NOTE — TELEPHONE ENCOUNTER
She is to call this week to schedule routine OV  I am issuing 1 month refill in the meantime  If she does not call by Friday, please call her again and schedule appt

## 2021-01-14 DIAGNOSIS — F32.A DEPRESSION, UNSPECIFIED DEPRESSION TYPE: ICD-10-CM

## 2021-01-15 NOTE — TELEPHONE ENCOUNTER
Please try to reach patient to schedule overdue f/u appt (multiple attempts have been made this month)  She needs to be seen to continue med refills

## 2021-01-18 RX ORDER — ESCITALOPRAM OXALATE 10 MG/1
TABLET ORAL
Qty: 60 TABLET | Refills: 0 | Status: SHIPPED | OUTPATIENT
Start: 2021-01-18 | End: 2021-02-19

## 2021-01-22 DIAGNOSIS — I25.10 CORONARY ARTERY DISEASE INVOLVING NATIVE CORONARY ARTERY OF NATIVE HEART WITHOUT ANGINA PECTORIS: ICD-10-CM

## 2021-01-22 RX ORDER — ATORVASTATIN CALCIUM 80 MG/1
TABLET, FILM COATED ORAL
Qty: 30 TABLET | Refills: 0 | Status: SHIPPED | OUTPATIENT
Start: 2021-01-22 | End: 2021-02-19

## 2021-01-25 ENCOUNTER — OFFICE VISIT (OUTPATIENT)
Dept: FAMILY MEDICINE CLINIC | Facility: HOSPITAL | Age: 56
End: 2021-01-25
Payer: MEDICARE

## 2021-01-25 ENCOUNTER — TELEPHONE (OUTPATIENT)
Dept: FAMILY MEDICINE CLINIC | Facility: HOSPITAL | Age: 56
End: 2021-01-25

## 2021-01-25 VITALS
SYSTOLIC BLOOD PRESSURE: 108 MMHG | TEMPERATURE: 96 F | OXYGEN SATURATION: 95 % | DIASTOLIC BLOOD PRESSURE: 70 MMHG | WEIGHT: 183.4 LBS | BODY MASS INDEX: 36.01 KG/M2 | HEIGHT: 60 IN | HEART RATE: 65 BPM

## 2021-01-25 DIAGNOSIS — F33.9 DEPRESSION, RECURRENT (HCC): ICD-10-CM

## 2021-01-25 DIAGNOSIS — M79.671 BILATERAL FOOT PAIN: ICD-10-CM

## 2021-01-25 DIAGNOSIS — E11.65 TYPE 2 DIABETES MELLITUS WITH HYPERGLYCEMIA, WITHOUT LONG-TERM CURRENT USE OF INSULIN (HCC): Primary | ICD-10-CM

## 2021-01-25 DIAGNOSIS — E78.2 MIXED HYPERLIPIDEMIA: ICD-10-CM

## 2021-01-25 DIAGNOSIS — I10 ESSENTIAL HYPERTENSION: ICD-10-CM

## 2021-01-25 DIAGNOSIS — M79.672 BILATERAL FOOT PAIN: ICD-10-CM

## 2021-01-25 DIAGNOSIS — Z11.59 NEED FOR HEPATITIS C SCREENING TEST: ICD-10-CM

## 2021-01-25 PROBLEM — E66.01 OBESITY, MORBID (HCC): Status: ACTIVE | Noted: 2021-01-25

## 2021-01-25 PROBLEM — E66.01 OBESITY, MORBID (HCC): Status: RESOLVED | Noted: 2021-01-25 | Resolved: 2021-01-25

## 2021-01-25 LAB — SL AMB POCT HEMOGLOBIN AIC: 7 (ref ?–6.5)

## 2021-01-25 PROCEDURE — 99214 OFFICE O/P EST MOD 30 MIN: CPT | Performed by: NURSE PRACTITIONER

## 2021-01-25 PROCEDURE — G0438 PPPS, INITIAL VISIT: HCPCS | Performed by: NURSE PRACTITIONER

## 2021-01-25 PROCEDURE — 83036 HEMOGLOBIN GLYCOSYLATED A1C: CPT | Performed by: NURSE PRACTITIONER

## 2021-01-25 NOTE — PROGRESS NOTES
Assessment and Plan:     Problem List Items Addressed This Visit        Endocrine    DM (diabetes mellitus) ([facility] ) - Primary       Lab Results   Component Value Date    HGBA1C 7 0 (A) 01/25/2021     A1C slightly improved  Continue same meds as rx'd and maximize lifestyle efforts w/low sugar/carb diet and regular exercise  Eye exam scheduled for 2/5  Foot exam UTD  On ARB daily  Return in 3 months for next follow up  Relevant Orders    POCT hemoglobin A1c (Completed)       Cardiovascular and Mediastinum    Hypertension     Adequate BP control on current meds  Continue same doses and return in 3 months for next BP check  Other    Hyperlipidemia     Compliant w/statin daily  Reminded her to update FLP ASAP as previously ordered and will call w/results  BMI 35 0-35 9,adult     BMI lower than previous  Encouraged ongoing diet and exercise efforts  Depression, recurrent (HCC)     Mood stable w/PHQ score of 8 and ELIANA score of 6 on daily lexapro  Continue same dose  Return in 3 months for next med check  Call sooner w/any acute mood concerns/changes  Other Visit Diagnoses     Bilateral foot pain        Need for hepatitis C screening test        Relevant Orders    Hepatitis C antibody           Preventive health issues were discussed with patient, and age appropriate screening tests were ordered as noted in patient's After Visit Summary  Personalized health advice and appropriate referrals for health education or preventive services given if needed, as noted in patient's After Visit Summary       History of Present Illness:     Patient presents for Medicare Annual Wellness visit    Patient Care Team:  JEANCARLOS Hernández as PCP - General (Family Medicine)     Problem List:     Patient Active Problem List   Diagnosis    Coronary artery disease involving native coronary artery of native heart without angina pectoris    S/P CABG x 2    Coronary artery disease involving coronary bypass graft of native heart without angina pectoris    S/P insertion of non-drug eluting coronary artery stent    Hypertension    Hyperlipidemia    Dyspnea on exertion    Sleep apnea    DM [facility]diabetes mellitus) ([facility] )    BMI 35 0-35 9,adult    Insomnia    Depression, recurrent (HCC)      Past Medical and Surgical History:     Past Medical History:   Diagnosis Date    Circulation problem     Coronary artery disease     Depression     Diabetes ([facility] )     Glaucoma     Hearing problem     Heart attack ([facility] )     Heart failure ([facility] )     Heart valve problem     High blood pressure     High cholesterol     Obesity, morbid ([place] [id number] ) 1/25/2021    Open injury of heart     Sleep apnea      Past Surgical History:   Procedure Laterality Date    CORONARY ARTERY BYPASS GRAFT  2011    CORONARY STENT PLACEMENT  2010 January    CORONARY STENT PLACEMENT  2015 June    VAGINAL DELIVERY  1987    VAGINAL DELIVERY  1989      Family History:     Family History   Problem Relation Age of Onset    Hypertension Father     Coronary artery disease Father    Yun Boateng Sudden death Father         cardiac 2014      Social History:        Social History     Socioeconomic History    Marital status:      Spouse name: None    Number of children: None    Years of education: None    Highest education level: None   Occupational History    None   Social Needs    Financial resource strain: None    Food insecurity     Worry: None     Inability: None    Transportation needs     Medical: None     Non-medical: None   Tobacco Use    Smoking status: Former Smoker    Smokeless tobacco: Never Used   Substance and Sexual Activity    Alcohol use: Yes     Comment: social    Drug use: Yes     Types: Marijuana    Sexual activity: None   Lifestyle    Physical activity     Days per week: None     Minutes per session: None    Stress: None   Relationships    Social connections     Talks on phone: None     Gets together: None     Attends Denominational service: None     Active member of club or organization: None     Attends meetings of clubs or organizations: None     Relationship status: None    Intimate partner violence     Fear of current or ex partner: None     Emotionally abused: None     Physically abused: None     Forced sexual activity: None   Other Topics Concern    None   Social History Narrative    None      Medications and Allergies:     Current Outpatient Medications   Medication Sig Dispense Refill    aspirin (ECOTRIN LOW STRENGTH) 81 mg EC tablet Take 81 mg by mouth daily      atorvastatin (LIPITOR) 80 mg tablet take 1 tablet by mouth daily 30 tablet 0    carvedilol (COREG) 6 25 mg tablet Take 1 tablet (6 25 mg total) by mouth 2 (two) times a day with meals 180 tablet 3    clopidogrel (PLAVIX) 75 mg tablet take 1 tablet by mouth daily 90 tablet 0    escitalopram (LEXAPRO) 10 mg tablet take 1 tablet by mouth twice a day 60 tablet 0    glipiZIDE (GLUCOTROL XL) 5 mg 24 hr tablet take 1 tablet by mouth daily 30 tablet 0    hydrochlorothiazide (HYDRODIURIL) 12 5 mg tablet Take 1 tablet (12 5 mg total) by mouth daily 90 tablet 1    losartan (COZAAR) 50 mg tablet take 1 tablet by mouth daily 90 tablet 0    MELATONIN PO Take 10 mg by mouth daily at bedtime       zolpidem (AMBIEN) 10 mg tablet take 1 tablet by mouth at bedtime if needed for sleep 90 tablet 0     No current facility-administered medications for this visit  Allergies   Allergen Reactions    Metformin     Penicillins       Immunizations: There is no immunization history on file for this patient     Health Maintenance:         Topic Date Due    Hepatitis C Screening  1965    MAMMOGRAM  1965    HIV Screening  08/17/1980    Cervical Cancer Screening  08/17/1986    Colorectal Cancer Screening  08/17/2015         Topic Date Due    Pneumococcal Vaccine: Pediatrics (0 to 5 Years) and At-Risk Patients (6 to 59 Years) (1 of 1 - PPSV23) 08/17/1971    DTaP,Tdap,and Td Vaccines (1 - Tdap) 08/17/1986    Influenza Vaccine (1) 09/01/2020      Medicare Health Risk Assessment:     /70 (Patient Position: Sitting, Cuff Size: Large)   Pulse 65   Temp (!) 96 °F (35 6 °C) (Temporal)   Ht 5' (1 524 m)   Wt 83 2 kg (183 lb 6 4 oz)   SpO2 95%   BMI 35 82 kg/m²      Bette Croft is here for her Subsequent Wellness visit  Last Medicare Wellness visit information reviewed, patient interviewed and updates made to the record today  Health Risk Assessment:   Patient rates overall health as good  Patient feels that their physical health rating is slightly worse  Eyesight was rated as slightly worse  Hearing was rated as same  Patient feels that their emotional and mental health rating is same  Pain experienced in the last 7 days has been none  Patient states that she has experienced weight loss or gain in last 6 months  Depression Screening:   PHQ-2 Score: 3  PHQ-9 Score: 8      Fall Risk Screening: In the past year, patient has experienced: no history of falling in past year      Urinary Incontinence Screening:   Patient has leaked urine accidently in the last six months  Home Safety:  Patient has trouble with stairs inside or outside of their home  Patient has working smoke alarms and has no working carbon monoxide detector  Home safety hazards include: none  Nutrition:   Current diet is Regular  Medications:   Patient is currently taking over-the-counter supplements  OTC medications include: see medication list  Patient is able to manage medications  Activities of Daily Living (ADLs)/Instrumental Activities of Daily Living (IADLs):   Walk and transfer into and out of bed and chair?: Yes  Dress and groom yourself?: Yes    Bathe or shower yourself?: Yes    Feed yourself?  Yes  Do your laundry/housekeeping?: Yes  Manage your money, pay your bills and track your expenses?: Yes  Make your own meals?: Yes    Do your own shopping?: Yes    Previous Hospitalizations:   Any hospitalizations or ED visits within the last 12 months?: No      Advance Care Planning:   Living will: No    Durable POA for healthcare: Yes    Advanced directive: No    Five wishes given: Yes      PREVENTIVE SCREENINGS      Cardiovascular Screening:    General: History Lipid Disorder    Due for: Lipid Panel      Diabetes Screening:     General: History Diabetes    Due for: Blood Glucose      Colorectal Cancer Screening:     General: Patient Declines      Breast Cancer Screening:     General: Patient Declines      Cervical Cancer Screening:    General: Patient Declines      Osteoporosis Screening:    General: Screening Not Indicated      Abdominal Aortic Aneurysm (AAA) Screening:        General: Screening Not Indicated      Lung Cancer Screening:     General: Screening Not Indicated      Hepatitis C Screening:      Hep C Screening Accepted:  Yes        JEANCARLOS Sales

## 2021-01-25 NOTE — PROGRESS NOTES
Assessment/Plan:    DM (diabetes mellitus) ([facility] )    Lab Results   Component Value Date    HGBA1C 7 0 (A) 01/25/2021     A1C slightly improved  Continue same meds as rx'd and maximize lifestyle efforts w/low sugar/carb diet and regular exercise  Eye exam scheduled for 2/5  Foot exam UTD  On ARB daily  Return in 3 months for next follow up  Hypertension  Adequate BP control on current meds  Continue same doses and return in 3 months for next BP check  Hyperlipidemia  Compliant w/statin daily  Reminded her to update FLP ASAP as previously ordered and will call w/results  Depression, recurrent (HCC)  Mood stable w/PHQ score of 8 and ELIANA score of 6 on daily lexapro  Continue same dose  Return in 3 months for next med check  Call sooner w/any acute mood concerns/changes  BMI 35 0-35 9,adult  BMI lower than previous  Encouraged ongoing diet and exercise efforts  Diagnoses and all orders for this visit:    Type 2 diabetes mellitus with hyperglycemia, without long-term current use of insulin (HCC)  -     POCT hemoglobin A1c    BMI 35 0-35 9,adult    Essential hypertension    Mixed hyperlipidemia    Bilateral foot pain    Depression, recurrent ([facility] )    Need for hepatitis C screening test  -     Hepatitis C antibody; Future          Subjective:      Patient ID: Favio Lutz is a 54 y o  female  Balbir Mims presents to the office today for a routine follow up  She has not had her labs completed prior to this visit  She states she needs refills of her medications  The only complaint she has today is worsening foot pain which she saw the podiatrist for a year ago and was given orthotics which she is no longer using  She states the pain is in her "bones" and always hurt  She has to walk a lot as he does not have transportation  She states she hasn't taken anything to relieve the pain  She states getting off her feet does improve the pain    She does not want to see podiatrist again because she feels he didn't help her original complaint  The following portions of the patient's history were reviewed and updated as appropriate: allergies, current medications, past family history, past medical history, past social history, past surgical history and problem list     Review of Systems   Constitutional: Negative for appetite change, chills, fatigue and fever  HENT: Negative  Eyes: Negative  Glasses bharath has an upcoming appointment with opthalmology next week   Respiratory: Negative for cough and shortness of breath  Cardiovascular: Negative  Gastrointestinal: Negative  Genitourinary:        + stress incontinence   Musculoskeletal: Positive for arthralgias  Bilateral foot pain   Skin:        Skin tags   Neurological: Negative  Psychiatric/Behavioral: Negative for sleep disturbance and suicidal ideas  Objective:      /70 (Patient Position: Sitting, Cuff Size: Large)   Pulse 65   Temp (!) 96 °F (35 6 °C) (Temporal)   Ht 5' (1 524 m)   Wt 83 2 kg (183 lb 6 4 oz)   SpO2 95%   BMI 35 82 kg/m²          Physical Exam  Constitutional:       General: She is not in acute distress  Appearance: She is obese  HENT:      Head: Normocephalic and atraumatic  Right Ear: Tympanic membrane, ear canal and external ear normal       Left Ear: Tympanic membrane, ear canal and external ear normal       Nose: Nose normal       Mouth/Throat:      Mouth: Mucous membranes are moist       Pharynx: Oropharynx is clear  Eyes:      Conjunctiva/sclera: Conjunctivae normal       Pupils: Pupils are equal, round, and reactive to light  Neck:      Musculoskeletal: Normal range of motion and neck supple  Cardiovascular:      Rate and Rhythm: Normal rate and regular rhythm  Pulses: Normal pulses  Heart sounds: Normal heart sounds  Pulmonary:      Effort: Pulmonary effort is normal       Breath sounds: Normal breath sounds     Abdominal:      Palpations: Abdomen is soft       Tenderness: There is no abdominal tenderness  Musculoskeletal: Normal range of motion  Lymphadenopathy:      Cervical: No cervical adenopathy  Skin:     General: Skin is warm and dry  Capillary Refill: Capillary refill takes less than 2 seconds  Neurological:      General: No focal deficit present  Mental Status: She is alert and oriented to person, place, and time  Psychiatric:         Mood and Affect: Mood normal          Behavior: Behavior normal          Thought Content: Thought content normal          Judgment: Judgment normal            PHQ-9 Depression Screening    PHQ-9:   Frequency of the following problems over the past two weeks:      Little interest or pleasure in doing things: 1 - several days  Feeling down, depressed, or hopeless: 2 - more than half the days  Trouble falling or staying asleep, or sleeping too much: 3 - nearly every day  Feeling tired or having little energy: 1 - several days  Poor appetite or overeatin - several days  Feeling bad about yourself - or that you are a failure or have let yourself or your family down: 0 - not at all  Trouble concentrating on things, such as reading the newspaper or watching television: 0 - not at all  Moving or speaking so slowly that other people could have noticed  Or the opposite - being so fidgety or restless that you have been moving around a lot more than usual: 0 - not at all  Thoughts that you would be better off dead, or of hurting yourself in some way: 0 - not at all  PHQ-2 Score: 3  PHQ-9 Score: 8         ELIANA-7 Flowsheet Screening      Most Recent Value   Over the last two weeks, how often have you been bothered by the following problems?     Feeling nervous, anxious, or on edge  1   Not being able to stop or control worrying  2   Worrying too much about different things  2   Trouble relaxing   0   Being so restless that it's hard to sit still  1   Becoming easily annoyed or irritable   0   Feeling afraid as if something awful might happen  0   How difficult have these problems made it for you to do your work, take care of things at home, or get along with other people? Not difficult at all   ELIANA Score   6        BMI Counseling: Body mass index is 35 82 kg/m²  The BMI is above normal  Nutrition recommendations include reducing portion sizes, 3-5 servings of fruits/vegetables daily, reducing fast food intake, consuming healthier snacks, decreasing soda and/or juice intake and moderation in carbohydrate intake  Exercise recommendations include exercising 3-5 times per week

## 2021-01-25 NOTE — PATIENT INSTRUCTIONS
Obesity   AMBULATORY CARE:   Obesity  is when your body mass index (BMI) is greater than 30  Your healthcare provider will use your height and weight to measure your BMI  The risks of obesity include  many health problems, such as injuries or physical disability  You may need tests to check for the following:  · Diabetes    · High blood pressure or high cholesterol    · Heart disease    · Gallbladder or liver disease    · Cancer of the colon, breast, prostate, liver, or kidney    · Sleep apnea    · Arthritis or gout    Seek care immediately if:   · You have a severe headache, confusion, or difficulty speaking  · You have weakness on one side of your body  · You have chest pain, sweating, or shortness of breath  Contact your healthcare provider if:   · You have symptoms of gallbladder or liver disease, such as pain in your upper abdomen  · You have knee or hip pain and discomfort while walking  · You have symptoms of diabetes, such as intense hunger and thirst, and frequent urination  · You have symptoms of sleep apnea, such as snoring or daytime sleepiness  · You have questions or concerns about your condition or care  Treatment for obesity  focuses on helping you lose weight to improve your health  Even a small decrease in BMI can reduce the risk for many health problems  Your healthcare provider will help you set a weight-loss goal   · Lifestyle changes  are the first step in treating obesity  These include making healthy food choices and getting regular physical activity  Your healthcare provider may suggest a weight-loss program that involves coaching, education, and therapy  · Medicine  may help you lose weight when it is used with a healthy diet and physical activity  · Surgery  can help you lose weight if you are very obese and have other health problems  There are several types of weight-loss surgery  Ask your healthcare provider for more information      Be successful losing weight:   · Set small, realistic goals  An example of a small goal is to walk for 20 minutes 5 days a week  Anther goal is to lose 5% of your body weight  · Tell friends, family members, and coworkers about your goals  and ask for their support  Ask a friend to lose weight with you, or join a weight-loss support group  · Identify foods or triggers that may cause you to overeat , and find ways to avoid them  Remove tempting high-calorie foods from your home and workplace  Place a bowl of fresh fruit on your kitchen counter  If stress causes you to eat, then find other ways to cope with stress  · Keep a diary to track what you eat and drink  Also write down how many minutes of physical activity you do each day  Weigh yourself once a week and record it in your diary  Eating changes: You will need to eat 500 to 1,000 fewer calories each day than you currently eat to lose 1 to 2 pounds a week  The following changes will help you cut calories:  · Eat smaller portions  Use small plates, no larger than 9 inches in diameter  Fill your plate half full of fruits and vegetables  Measure your food using measuring cups until you know what a serving size looks like  · Eat 3 meals and 1 or 2 snacks each day  Plan your meals in advance  Tavia Lange and eat at home most of the time  Eat slowly  Do not skip meals  Skipping meals can lead to overeating later in the day  This can make it harder for you to lose weight  Talk with a dietitian to help you make a meal plan and schedule that is right for you  · Eat fruits and vegetables at every meal   They are low in calories and high in fiber, which makes you feel full  Do not add butter, margarine, or cream sauce to vegetables  Use herbs to season steamed vegetables  · Eat less fat and fewer fried foods  Eat more baked or grilled chicken and fish  These protein sources are lower in calories and fat than red meat  Limit fast food   Dress your salads with olive oil and vinegar instead of bottled dressing  · Limit the amount of sugar you eat  Do not drink sugary beverages  Limit alcohol  Activity changes:  Physical activity is good for your body in many ways  It helps you burn calories and build strong muscles  It decreases stress and depression, and improves your mood  It can also help you sleep better  Talk to your healthcare provider before you begin an exercise program   · Exercise for at least 30 minutes 5 days a week  Start slowly  Set aside time each day for physical activity that you enjoy and that is convenient for you  It is best to do both weight training and an activity that increases your heart rate, such as walking, bicycling, or swimming  · Find ways to be more active  Do yard work and housecleaning  Walk up the stairs instead of using elevators  Spend your leisure time going to events that require walking, such as outdoor festivals or fairs  This extra physical activity can help you lose weight and keep it off  Follow up with your healthcare provider as directed: You may need to meet with a dietitian  Write down your questions so you remember to ask them during your visits  © Copyright [facility] [address] Information is for End User's use only and may not be sold, redistributed or otherwise used for commercial purposes  All illustrations and images included in CareNotes® are the copyrighted property of [employer]  or [facility] Reina Hagan   The above information is an  only  It is not intended as medical advice for individual conditions or treatments  Talk to your doctor, nurse or pharmacist before following any medical regimen to see if it is safe and effective for you

## 2021-01-26 NOTE — ASSESSMENT & PLAN NOTE
Compliant w/statin daily  Reminded her to update FLP ASAP as previously ordered and will call w/results

## 2021-01-26 NOTE — ASSESSMENT & PLAN NOTE
Mood stable w/PHQ score of 8 and ELIANA score of 6 on daily lexapro  Continue same dose  Return in 3 months for next med check  Call sooner w/any acute mood concerns/changes

## 2021-01-26 NOTE — ASSESSMENT & PLAN NOTE
Lab Results   Component Value Date    HGBA1C 7 0 (A) 01/25/2021     A1C slightly improved  Continue same meds as rx'd and maximize lifestyle efforts w/low sugar/carb diet and regular exercise  Eye exam scheduled for 2/5  Foot exam UTD  On ARB daily  Return in 3 months for next follow up

## 2021-01-30 DIAGNOSIS — E11.65 TYPE 2 DIABETES MELLITUS WITH HYPERGLYCEMIA, WITHOUT LONG-TERM CURRENT USE OF INSULIN (HCC): ICD-10-CM

## 2021-01-30 RX ORDER — GLIPIZIDE 5 MG/1
TABLET, FILM COATED, EXTENDED RELEASE ORAL
Qty: 30 TABLET | Refills: 0 | Status: SHIPPED | OUTPATIENT
Start: 2021-01-30 | End: 2021-02-02

## 2021-02-02 DIAGNOSIS — I25.10 CORONARY ARTERY DISEASE INVOLVING NATIVE CORONARY ARTERY OF NATIVE HEART WITHOUT ANGINA PECTORIS: ICD-10-CM

## 2021-02-02 DIAGNOSIS — E11.65 TYPE 2 DIABETES MELLITUS WITH HYPERGLYCEMIA, WITHOUT LONG-TERM CURRENT USE OF INSULIN (HCC): ICD-10-CM

## 2021-02-02 RX ORDER — GLIPIZIDE 5 MG/1
TABLET, FILM COATED, EXTENDED RELEASE ORAL
Qty: 30 TABLET | Refills: 0 | Status: SHIPPED | OUTPATIENT
Start: 2021-02-02 | End: 2021-02-04 | Stop reason: SDUPTHER

## 2021-02-03 RX ORDER — CLOPIDOGREL BISULFATE 75 MG/1
TABLET ORAL
Qty: 90 TABLET | Refills: 0 | Status: SHIPPED | OUTPATIENT
Start: 2021-02-03 | End: 2021-05-14

## 2021-02-04 DIAGNOSIS — E11.65 TYPE 2 DIABETES MELLITUS WITH HYPERGLYCEMIA, WITHOUT LONG-TERM CURRENT USE OF INSULIN (HCC): ICD-10-CM

## 2021-02-05 RX ORDER — GLIPIZIDE 5 MG/1
5 TABLET, FILM COATED, EXTENDED RELEASE ORAL DAILY
Qty: 90 TABLET | Refills: 0 | Status: SHIPPED | OUTPATIENT
Start: 2021-02-05 | End: 2021-06-13

## 2021-02-19 DIAGNOSIS — I25.10 CORONARY ARTERY DISEASE INVOLVING NATIVE CORONARY ARTERY OF NATIVE HEART WITHOUT ANGINA PECTORIS: ICD-10-CM

## 2021-02-19 DIAGNOSIS — F32.A DEPRESSION, UNSPECIFIED DEPRESSION TYPE: ICD-10-CM

## 2021-02-19 RX ORDER — ESCITALOPRAM OXALATE 10 MG/1
TABLET ORAL
Qty: 60 TABLET | Refills: 2 | Status: SHIPPED | OUTPATIENT
Start: 2021-02-19 | End: 2021-05-31

## 2021-02-19 RX ORDER — ATORVASTATIN CALCIUM 80 MG/1
TABLET, FILM COATED ORAL
Qty: 30 TABLET | Refills: 2 | Status: SHIPPED | OUTPATIENT
Start: 2021-02-19 | End: 2021-04-27

## 2021-03-23 DIAGNOSIS — I10 ESSENTIAL HYPERTENSION: ICD-10-CM

## 2021-03-23 RX ORDER — HYDROCHLOROTHIAZIDE 12.5 MG/1
TABLET ORAL
Qty: 90 TABLET | Refills: 1 | Status: SHIPPED | OUTPATIENT
Start: 2021-03-23 | End: 2021-09-15

## 2021-03-26 DIAGNOSIS — G47.00 INSOMNIA, UNSPECIFIED TYPE: ICD-10-CM

## 2021-03-26 RX ORDER — ZOLPIDEM TARTRATE 10 MG/1
TABLET ORAL
Qty: 90 TABLET | Refills: 0 | Status: SHIPPED | OUTPATIENT
Start: 2021-03-26 | End: 2021-06-28

## 2021-04-26 ENCOUNTER — OFFICE VISIT (OUTPATIENT)
Dept: FAMILY MEDICINE CLINIC | Facility: HOSPITAL | Age: 56
End: 2021-04-26
Payer: MEDICARE

## 2021-04-26 VITALS
SYSTOLIC BLOOD PRESSURE: 118 MMHG | HEART RATE: 76 BPM | TEMPERATURE: 97 F | BODY MASS INDEX: 36.48 KG/M2 | OXYGEN SATURATION: 96 % | HEIGHT: 60 IN | DIASTOLIC BLOOD PRESSURE: 80 MMHG | WEIGHT: 185.8 LBS

## 2021-04-26 DIAGNOSIS — E11.65 TYPE 2 DIABETES MELLITUS WITH HYPERGLYCEMIA, WITHOUT LONG-TERM CURRENT USE OF INSULIN (HCC): Primary | ICD-10-CM

## 2021-04-26 DIAGNOSIS — I10 ESSENTIAL HYPERTENSION: ICD-10-CM

## 2021-04-26 DIAGNOSIS — I25.10 CORONARY ARTERY DISEASE INVOLVING NATIVE CORONARY ARTERY OF NATIVE HEART WITHOUT ANGINA PECTORIS: ICD-10-CM

## 2021-04-26 DIAGNOSIS — F33.9 DEPRESSION, RECURRENT (HCC): ICD-10-CM

## 2021-04-26 DIAGNOSIS — E78.2 MIXED HYPERLIPIDEMIA: ICD-10-CM

## 2021-04-26 DIAGNOSIS — G47.37 CENTRAL SLEEP APNEA DUE TO MEDICAL CONDITION: ICD-10-CM

## 2021-04-26 PROCEDURE — 99214 OFFICE O/P EST MOD 30 MIN: CPT | Performed by: NURSE PRACTITIONER

## 2021-04-26 NOTE — ASSESSMENT & PLAN NOTE
Mood stable w/PHQ score of 9 on daily lexapro  Continue same dose and return in 6 months for next follow up

## 2021-04-26 NOTE — ASSESSMENT & PLAN NOTE
Lab Results   Component Value Date    HGBA1C 7 0 (A) 01/25/2021     Reminded pt she is due to update fasting labs - advise she do ASAP  Foot exam updated tonight  Aware she is still due to schedule eye exam    Continue ARB daily as rx'd  Return in 6 months for next follow up

## 2021-04-26 NOTE — PROGRESS NOTES
Assessment/Plan:    DM (diabetes mellitus) ([facility] )    Lab Results   Component Value Date    HGBA1C 7 0 (A) 01/25/2021     Reminded pt she is due to update fasting labs - advise she do ASAP  Foot exam updated tonight  Aware she is still due to schedule eye exam    Continue ARB daily as rx'd  Return in 6 months for next follow up  Sleep apnea  Consult entered to f/u with sleep medicine (needs c-pap re-evaluated)    Coronary artery disease involving native coronary artery of native heart without angina pectoris  Clinically stable/asymptomatic  Maintain f/u with cardiology as planned  Hypertension  Stable BP control on current meds  Continue same doses and return in 6 months for next recheck  Hyperlipidemia  Compliant w/daily statin  Overdue for fasting labs as ordered  Reminded to update ASAP  Depression, recurrent ([facility] )  Mood stable w/PHQ score of 9 on daily lexapro  Continue same dose and return in 6 months for next follow up  Diagnoses and all orders for this visit:    Type 2 diabetes mellitus with hyperglycemia, without long-term current use of insulin (HCC)    Coronary artery disease involving native coronary artery of native heart without angina pectoris    Essential hypertension    Mixed hyperlipidemia    Central sleep apnea due to medical condition  -     Ambulatory referral to Sleep Medicine; Future    Depression, recurrent (HCC)      Pt declines mammo, colon screen, pap smear and vaccinations (adacel & covid)      Subjective:      Patient ID: Saintclair Parson is a 54 y o  female  States she has been well  Has been holding off on getting covid vaccine  States she is not ready  Hasn't been to any other specialists recently  Believes she is UTD w/cardiology  No change in meds and takes all regularly  States she is not interested in doing mammogram or colon screening  Has forgotten to do blood work as previously ordered      States she is not using c-pap because she needs to get it fixed       The following portions of the patient's history were reviewed and updated as appropriate: allergies, current medications, past family history, past medical history, past social history, past surgical history and problem list     Review of Systems   Constitutional: Negative  Respiratory: Negative for cough and shortness of breath  Cardiovascular: Negative for chest pain, palpitations and leg swelling  Gastrointestinal: Negative for abdominal pain, blood in stool, constipation and diarrhea  Psychiatric/Behavioral:        Getting forgetful, not bad         Objective:      /80 (Patient Position: Sitting, Cuff Size: Standard)   Pulse 76   Temp (!) 97 °F (36 1 °C) (Tympanic)   Ht 5' (1 524 m)   Wt 84 3 kg (185 lb 12 8 oz)   SpO2 96%   BMI 36 29 kg/m²          Physical Exam  Vitals signs reviewed  Constitutional:       General: She is not in acute distress  Appearance: Normal appearance  HENT:      Head: Normocephalic and atraumatic  Eyes:      General: No scleral icterus  Neck:      Thyroid: No thyromegaly  Vascular: No carotid bruit  Cardiovascular:      Rate and Rhythm: Normal rate and regular rhythm  Pulses: no weak pulses          Dorsalis pedis pulses are 1+ on the right side and 1+ on the left side  Posterior tibial pulses are 1+ on the right side and 1+ on the left side  Heart sounds: No murmur  Pulmonary:      Effort: Pulmonary effort is normal  No respiratory distress  Breath sounds: Normal breath sounds  Musculoskeletal:      Right lower leg: No edema  Left lower leg: No edema  Feet:      Right foot:      Skin integrity: No ulcer, skin breakdown, erythema, warmth, callus or dry skin  Left foot:      Skin integrity: No ulcer, skin breakdown, erythema, warmth, callus or dry skin  Lymphadenopathy:      Cervical: No cervical adenopathy  Skin:     General: Skin is warm and dry     Neurological:      General: No focal deficit present  Mental Status: She is alert and oriented to person, place, and time  Psychiatric:         Mood and Affect: Mood normal          Behavior: Behavior normal          Thought Content: Thought content normal          Judgment: Judgment normal            Patient's shoes and socks removed  Right Foot/Ankle   Right Foot Inspection  Skin Exam: skin normal and skin intact no dry skin, no warmth, no callus, no erythema, no maceration, no abnormal color, no pre-ulcer, no ulcer and no callus                          Toe Exam: ROM and strength within normal limits  Sensory   Vibration: intact    Monofilament testing: intact  Vascular    The right DP pulse is 1+  The right PT pulse is 1+  Left Foot/Ankle  Left Foot Inspection  Skin Exam: skin normal and skin intactno dry skin, no warmth, no erythema, no maceration, normal color, no pre-ulcer, no ulcer and no callus                         Toe Exam: ROM and strength within normal limits                   Sensory   Vibration: intact    Monofilament: intact  Vascular    The left DP pulse is 1+  The left PT pulse is 1+  Assign Risk Category:  No deformity present; No loss of protective sensation;  No weak pulses       Risk: 0

## 2021-04-27 RX ORDER — ATORVASTATIN CALCIUM 80 MG/1
TABLET, FILM COATED ORAL
Qty: 90 TABLET | Refills: 0 | Status: SHIPPED | OUTPATIENT
Start: 2021-04-27 | End: 2021-07-16

## 2021-05-13 DIAGNOSIS — I25.10 CORONARY ARTERY DISEASE INVOLVING NATIVE CORONARY ARTERY OF NATIVE HEART WITHOUT ANGINA PECTORIS: ICD-10-CM

## 2021-05-14 RX ORDER — CLOPIDOGREL BISULFATE 75 MG/1
TABLET ORAL
Qty: 90 TABLET | Refills: 1 | Status: SHIPPED | OUTPATIENT
Start: 2021-05-14 | End: 2021-11-18

## 2021-05-24 DIAGNOSIS — I10 ESSENTIAL HYPERTENSION: ICD-10-CM

## 2021-05-24 RX ORDER — LOSARTAN POTASSIUM 50 MG/1
TABLET ORAL
Qty: 90 TABLET | Refills: 1 | Status: SHIPPED | OUTPATIENT
Start: 2021-05-24 | End: 2021-11-30

## 2021-05-31 DIAGNOSIS — F32.A DEPRESSION, UNSPECIFIED DEPRESSION TYPE: ICD-10-CM

## 2021-05-31 RX ORDER — ESCITALOPRAM OXALATE 10 MG/1
TABLET ORAL
Qty: 60 TABLET | Refills: 2 | Status: SHIPPED | OUTPATIENT
Start: 2021-05-31 | End: 2021-09-07

## 2021-06-11 ENCOUNTER — OFFICE VISIT (OUTPATIENT)
Dept: FAMILY MEDICINE CLINIC | Facility: HOSPITAL | Age: 56
End: 2021-06-11
Payer: MEDICARE

## 2021-06-11 VITALS
WEIGHT: 186 LBS | HEIGHT: 60 IN | BODY MASS INDEX: 36.52 KG/M2 | HEART RATE: 72 BPM | SYSTOLIC BLOOD PRESSURE: 132 MMHG | TEMPERATURE: 97.7 F | DIASTOLIC BLOOD PRESSURE: 74 MMHG

## 2021-06-11 DIAGNOSIS — H92.21 BLEEDING FROM RIGHT EAR: Primary | ICD-10-CM

## 2021-06-11 PROCEDURE — 99213 OFFICE O/P EST LOW 20 MIN: CPT | Performed by: NURSE PRACTITIONER

## 2021-06-11 NOTE — PROGRESS NOTES
Assessment/Plan:    No problem-specific Assessment & Plan notes found for this encounter  Diagnoses and all orders for this visit:    Bleeding from right ear  Comments:  secondary to q-tip injury; advise watch and wait for resolution given nml TM; advise no further q-tips into ear canal          Subjective:      Patient ID: Saintclair Parson is a 54 y o  female  Reports taking a shower before bed and used Qtips in both ears  Woke up this AM with blood on pillow from R ear  Feels like she has water stuck in her ear  Denies pain  The following portions of the patient's history were reviewed and updated as appropriate: allergies, current medications, past family history, past medical history, past social history, past surgical history and problem list     Review of Systems   Constitutional: Negative for diaphoresis, fatigue and fever  HENT: Positive for ear discharge (reports blood on pillow this AM from R ear)  Negative for congestion, ear pain, rhinorrhea and sore throat  Eyes: Negative for photophobia, pain and visual disturbance  Respiratory: Negative for chest tightness, shortness of breath and wheezing  Cardiovascular: Negative for chest pain, palpitations and leg swelling  Gastrointestinal: Negative for abdominal pain, diarrhea, nausea and vomiting  Genitourinary: Negative for difficulty urinating, flank pain, hematuria and pelvic pain  Musculoskeletal: Negative for arthralgias and back pain  Skin: Negative for color change and pallor  Allergic/Immunologic: Negative for environmental allergies  Neurological: Negative for dizziness, seizures, facial asymmetry, weakness and headaches  Psychiatric/Behavioral: Negative for agitation and sleep disturbance  The patient is not nervous/anxious            Objective:      /74   Pulse 72   Temp 97 7 °F (36 5 °C)   Ht 5' (1 524 m)   Wt 84 4 kg (186 lb)   BMI 36 33 kg/m²          Physical Exam  Constitutional:       General: She is not in acute distress  Appearance: She is normal weight  She is not toxic-appearing  HENT:      Head: Normocephalic  Right Ear: Decreased hearing noted  Drainage present  No tenderness  No hemotympanum  Tympanic membrane is not perforated  Left Ear: Hearing and tympanic membrane normal       Ears:      Comments: BRB distal ear canal partially obscuring TM  No visible TM rupture, - pain     Mouth/Throat:      Mouth: Mucous membranes are moist    Eyes:      Pupils: Pupils are equal, round, and reactive to light  Neck:      Musculoskeletal: Normal range of motion  Cardiovascular:      Rate and Rhythm: Normal rate and regular rhythm  Pulses: Normal pulses  Heart sounds: Normal heart sounds  Pulmonary:      Effort: Pulmonary effort is normal       Breath sounds: Normal breath sounds  Abdominal:      General: Bowel sounds are normal       Palpations: Abdomen is soft  Musculoskeletal: Normal range of motion  Skin:     General: Skin is warm and dry  Capillary Refill: Capillary refill takes less than 2 seconds  Neurological:      General: No focal deficit present  Mental Status: She is alert and oriented to person, place, and time  Psychiatric:         Mood and Affect: Mood normal          Thought Content:  Thought content normal          Judgment: Judgment normal

## 2021-06-13 DIAGNOSIS — E11.65 TYPE 2 DIABETES MELLITUS WITH HYPERGLYCEMIA, WITHOUT LONG-TERM CURRENT USE OF INSULIN (HCC): ICD-10-CM

## 2021-06-13 RX ORDER — GLIPIZIDE 5 MG/1
TABLET, FILM COATED, EXTENDED RELEASE ORAL
Qty: 30 TABLET | Refills: 1 | Status: SHIPPED | OUTPATIENT
Start: 2021-06-13 | End: 2021-06-17

## 2021-09-07 DIAGNOSIS — F32.A DEPRESSION, UNSPECIFIED DEPRESSION TYPE: ICD-10-CM

## 2021-09-07 RX ORDER — ESCITALOPRAM OXALATE 10 MG/1
TABLET ORAL
Qty: 60 TABLET | Refills: 2 | Status: SHIPPED | OUTPATIENT
Start: 2021-09-07 | End: 2021-11-30

## 2021-09-14 DIAGNOSIS — I10 ESSENTIAL HYPERTENSION: ICD-10-CM

## 2021-09-15 RX ORDER — HYDROCHLOROTHIAZIDE 12.5 MG/1
TABLET ORAL
Qty: 90 TABLET | Refills: 1 | Status: SHIPPED | OUTPATIENT
Start: 2021-09-15 | End: 2022-02-16

## 2021-09-15 RX ORDER — CARVEDILOL 6.25 MG/1
TABLET ORAL
Qty: 180 TABLET | Refills: 3 | Status: SHIPPED | OUTPATIENT
Start: 2021-09-15 | End: 2022-07-26 | Stop reason: SDUPTHER

## 2021-10-21 ENCOUNTER — TELEPHONE (OUTPATIENT)
Dept: FAMILY MEDICINE CLINIC | Facility: HOSPITAL | Age: 56
End: 2021-10-21

## 2021-10-27 ENCOUNTER — APPOINTMENT (OUTPATIENT)
Dept: LAB | Facility: HOSPITAL | Age: 56
End: 2021-10-27
Payer: MEDICARE

## 2021-10-27 DIAGNOSIS — E11.65 TYPE 2 DIABETES MELLITUS WITH HYPERGLYCEMIA, WITHOUT LONG-TERM CURRENT USE OF INSULIN (HCC): ICD-10-CM

## 2021-10-27 DIAGNOSIS — Z11.4 ENCOUNTER FOR SCREENING FOR HIV: ICD-10-CM

## 2021-10-27 DIAGNOSIS — E78.5 HYPERLIPIDEMIA, UNSPECIFIED HYPERLIPIDEMIA TYPE: ICD-10-CM

## 2021-10-27 LAB
ALBUMIN SERPL BCP-MCNC: 3.5 G/DL (ref 3.5–5)
ALP SERPL-CCNC: 112 U/L (ref 46–116)
ALT SERPL W P-5'-P-CCNC: 39 U/L (ref 12–78)
ANION GAP SERPL CALCULATED.3IONS-SCNC: 4 MMOL/L (ref 4–13)
AST SERPL W P-5'-P-CCNC: 24 U/L (ref 5–45)
BASOPHILS # BLD AUTO: 0.04 THOUSANDS/ΜL (ref 0–0.1)
BASOPHILS NFR BLD AUTO: 0 % (ref 0–1)
BILIRUB SERPL-MCNC: 0.44 MG/DL (ref 0.2–1)
BUN SERPL-MCNC: 19 MG/DL (ref 5–25)
CALCIUM SERPL-MCNC: 8.8 MG/DL (ref 8.3–10.1)
CHLORIDE SERPL-SCNC: 105 MMOL/L (ref 100–108)
CHOLEST SERPL-MCNC: 166 MG/DL (ref 50–200)
CO2 SERPL-SCNC: 30 MMOL/L (ref 21–32)
CREAT SERPL-MCNC: 0.87 MG/DL (ref 0.6–1.3)
EOSINOPHIL # BLD AUTO: 0.16 THOUSAND/ΜL (ref 0–0.61)
EOSINOPHIL NFR BLD AUTO: 2 % (ref 0–6)
ERYTHROCYTE [DISTWIDTH] IN BLOOD BY AUTOMATED COUNT: 12.5 % (ref 11.6–15.1)
GFR SERPL CREATININE-BSD FRML MDRD: 75 ML/MIN/1.73SQ M
GLUCOSE SERPL-MCNC: 152 MG/DL (ref 65–140)
HCT VFR BLD AUTO: 43.5 % (ref 34.8–46.1)
HDLC SERPL-MCNC: 45 MG/DL
HGB BLD-MCNC: 14.5 G/DL (ref 11.5–15.4)
IMM GRANULOCYTES # BLD AUTO: 0.03 THOUSAND/UL (ref 0–0.2)
IMM GRANULOCYTES NFR BLD AUTO: 0 % (ref 0–2)
LDLC SERPL CALC-MCNC: 80 MG/DL (ref 0–100)
LYMPHOCYTES # BLD AUTO: 2.21 THOUSANDS/ΜL (ref 0.6–4.47)
LYMPHOCYTES NFR BLD AUTO: 25 % (ref 14–44)
MCH RBC QN AUTO: 29.7 PG (ref 26.8–34.3)
MCHC RBC AUTO-ENTMCNC: 33.3 G/DL (ref 31.4–37.4)
MCV RBC AUTO: 89 FL (ref 82–98)
MONOCYTES # BLD AUTO: 0.65 THOUSAND/ΜL (ref 0.17–1.22)
MONOCYTES NFR BLD AUTO: 7 % (ref 4–12)
NEUTROPHILS # BLD AUTO: 5.82 THOUSANDS/ΜL (ref 1.85–7.62)
NEUTS SEG NFR BLD AUTO: 66 % (ref 43–75)
NONHDLC SERPL-MCNC: 121 MG/DL
NRBC BLD AUTO-RTO: 0 /100 WBCS
PLATELET # BLD AUTO: 309 THOUSANDS/UL (ref 149–390)
PMV BLD AUTO: 9.5 FL (ref 8.9–12.7)
POTASSIUM SERPL-SCNC: 3.5 MMOL/L (ref 3.5–5.3)
PROT SERPL-MCNC: 7.8 G/DL (ref 6.4–8.2)
RBC # BLD AUTO: 4.88 MILLION/UL (ref 3.81–5.12)
SODIUM SERPL-SCNC: 139 MMOL/L (ref 136–145)
TRIGL SERPL-MCNC: 204 MG/DL
TSH SERPL DL<=0.05 MIU/L-ACNC: 2.56 UIU/ML (ref 0.36–3.74)
WBC # BLD AUTO: 8.91 THOUSAND/UL (ref 4.31–10.16)

## 2021-10-27 PROCEDURE — 85025 COMPLETE CBC W/AUTO DIFF WBC: CPT

## 2021-10-27 PROCEDURE — 36415 COLL VENOUS BLD VENIPUNCTURE: CPT

## 2021-10-27 PROCEDURE — 80053 COMPREHEN METABOLIC PANEL: CPT

## 2021-10-27 PROCEDURE — 84443 ASSAY THYROID STIM HORMONE: CPT

## 2021-10-27 PROCEDURE — 87389 HIV-1 AG W/HIV-1&-2 AB AG IA: CPT

## 2021-10-27 PROCEDURE — 80061 LIPID PANEL: CPT

## 2021-10-29 LAB — HIV 1+2 AB+HIV1 P24 AG SERPL QL IA: NORMAL

## 2021-11-04 ENCOUNTER — OFFICE VISIT (OUTPATIENT)
Dept: FAMILY MEDICINE CLINIC | Facility: HOSPITAL | Age: 56
End: 2021-11-04
Payer: MEDICARE

## 2021-11-04 VITALS
SYSTOLIC BLOOD PRESSURE: 98 MMHG | TEMPERATURE: 97.6 F | BODY MASS INDEX: 36.63 KG/M2 | HEIGHT: 60 IN | WEIGHT: 186.6 LBS | HEART RATE: 72 BPM | DIASTOLIC BLOOD PRESSURE: 64 MMHG

## 2021-11-04 DIAGNOSIS — E78.2 MIXED HYPERLIPIDEMIA: ICD-10-CM

## 2021-11-04 DIAGNOSIS — G47.37 CENTRAL SLEEP APNEA DUE TO MEDICAL CONDITION: ICD-10-CM

## 2021-11-04 DIAGNOSIS — E11.65 TYPE 2 DIABETES MELLITUS WITH HYPERGLYCEMIA, WITHOUT LONG-TERM CURRENT USE OF INSULIN (HCC): Primary | ICD-10-CM

## 2021-11-04 DIAGNOSIS — I10 PRIMARY HYPERTENSION: ICD-10-CM

## 2021-11-04 DIAGNOSIS — G47.00 INSOMNIA, UNSPECIFIED TYPE: ICD-10-CM

## 2021-11-04 DIAGNOSIS — I25.10 CORONARY ARTERY DISEASE INVOLVING NATIVE CORONARY ARTERY OF NATIVE HEART WITHOUT ANGINA PECTORIS: ICD-10-CM

## 2021-11-04 LAB — SL AMB POCT HEMOGLOBIN AIC: 7.7 (ref ?–6.5)

## 2021-11-04 PROCEDURE — 99214 OFFICE O/P EST MOD 30 MIN: CPT | Performed by: NURSE PRACTITIONER

## 2021-11-04 PROCEDURE — 83036 HEMOGLOBIN GLYCOSYLATED A1C: CPT | Performed by: NURSE PRACTITIONER

## 2021-11-04 RX ORDER — TRAZODONE HYDROCHLORIDE 50 MG/1
50 TABLET ORAL
Qty: 90 TABLET | Refills: 0 | Status: SHIPPED | OUTPATIENT
Start: 2021-11-04 | End: 2022-01-26

## 2021-11-04 RX ORDER — GLIPIZIDE 10 MG/1
10 TABLET, FILM COATED, EXTENDED RELEASE ORAL DAILY
Qty: 90 TABLET | Refills: 1 | Status: SHIPPED | OUTPATIENT
Start: 2021-11-04 | End: 2022-03-03

## 2021-11-17 DIAGNOSIS — I25.10 CORONARY ARTERY DISEASE INVOLVING NATIVE CORONARY ARTERY OF NATIVE HEART WITHOUT ANGINA PECTORIS: ICD-10-CM

## 2021-11-18 RX ORDER — CLOPIDOGREL BISULFATE 75 MG/1
TABLET ORAL
Qty: 90 TABLET | Refills: 1 | Status: SHIPPED | OUTPATIENT
Start: 2021-11-18 | End: 2022-06-02

## 2022-01-11 ENCOUNTER — CONSULT (OUTPATIENT)
Dept: PULMONOLOGY | Facility: HOSPITAL | Age: 57
End: 2022-01-11
Payer: MEDICARE

## 2022-01-11 VITALS
HEIGHT: 60 IN | SYSTOLIC BLOOD PRESSURE: 122 MMHG | RESPIRATION RATE: 18 BRPM | WEIGHT: 183 LBS | OXYGEN SATURATION: 97 % | BODY MASS INDEX: 35.93 KG/M2 | HEART RATE: 68 BPM | DIASTOLIC BLOOD PRESSURE: 70 MMHG

## 2022-01-11 DIAGNOSIS — G47.00 INSOMNIA, UNSPECIFIED TYPE: ICD-10-CM

## 2022-01-11 DIAGNOSIS — G47.33 OBSTRUCTIVE SLEEP APNEA SYNDROME: Primary | ICD-10-CM

## 2022-01-11 DIAGNOSIS — I10 PRIMARY HYPERTENSION: ICD-10-CM

## 2022-01-11 PROCEDURE — 99204 OFFICE O/P NEW MOD 45 MIN: CPT | Performed by: INTERNAL MEDICINE

## 2022-01-11 NOTE — PROGRESS NOTES
Sleep Consultation   Devan Cardoza 64 y o  female MRN: [id number]      Reason for consultation: GABI    Requesting physician: Dr Katie Edgar     Assessment/Plan  1  Obstructive sleep apnea syndrome  Assessment & Plan:  · That was diagnosed about 5 years ago in an outside hospital, unfortunately we do not have records  Requested records from [employer] which is her [employer]  · The patient is bringing her CPAP machine claiming that it has connection problem to the hose, I recheck did the tubing in the machine which seems to be functional, recommended to the patient to restart using it  I obtained her last compliance which seems to be in 2017 back then she had a minimal residual AHI of 0 9 on CPAP of 12 cm water  · Prescription for supplies tubing am asking was placed will be sent to [place], would like to see the patient back in 3 months to review her compliance  If the patient continues to have issues with the CPAP consider repeating a sleep study to reestablish her diagnosis next visit  Orders:  -     PAP DME Resupply/Reorder    2  BMI 36 0-36 9,adult  Assessment & Plan:  Noted, encourage lifestyle modifications and weight loss      3  Primary hypertension  Assessment & Plan:  Blood pressure is controlled, continues to take blood pressure medications  4  Insomnia, unspecified type  Assessment & Plan:  Seems to have Ambien that is been managed by her primary care physician  That could certainly be exacerbated by her untreated obstructive sleep apnea, recommend going back on CPAP as soon as possible          History of Present Illness   HPI:  Devan Cardoza is a 64 y o  female with PMHx as below who comes in for evaluation of history of obstructive sleep apnea diagnosed in 2015 in [address] [place] [address], unfortunately not have records for her sleep study    The patient has history of snoring and stopping breathing while she is sleepy for which she underwent a sleep study per history she used CPAP for a while and she said that it has made some improvement in her energy levels during the day, however she stopped using it because of supplies problems  Her sleep routine is going to bed around 8-10 p m  Falls asleep instantaneously and wakes up at 7:00 a m  She has 2-3 awakenings throughout the night for using the bathroom she feels sleepy during the day specially in the morning, she takes Ambien sometimes for insomnia  She snores loudly and stops breathing while she is sleep  She has infrequent headache and inability to keep his legs still during the day, she drinks 1 cup of coffee per day, she used to smoke and quit in 2011, she drinks alcohol occasionally, she used to smoke marijuana in the past   [place] Scale is 3/24              Review of Systems      Genitourinary post menopausal (no peroids)   Cardiology palpitations/fluttering feeling in the chest   Gastrointestinal none   Neurology none   Constitutional none   Integumentary none   Psychiatry anxiety and depression   Musculoskeletal legs twitching/jerking   Pulmonary shortness of breath with activity and snoring   ENT throat clearing and ringing in ears   Endocrine none   Hematological none               Historical Information   Past Medical History:   Diagnosis Date    Circulation problem     Coronary artery disease     Depression     [facility]     Glaucoma     Hearing problem     Heart attack ([address]     Heart failure ([facility]     Heart valve problem     High blood pressure     High cholesterol     Obesity, morbid ([facility] [facility] ) 1/25/2021    Open injury of heart     Sleep apnea      Past Surgical History:   Procedure Laterality Date    CORONARY ARTERY BYPASS GRAFT  2011    CORONARY STENT PLACEMENT  2010 January    CORONARY STENT PLACEMENT  2015 June    VAGINAL DELIVERY  1987    VAGINAL DELIVERY  1989     Family History   Problem Relation Age of Onset    Hypertension Father     Coronary artery disease Father     Sudden death Father         cardiac      Social History     Socioeconomic History    Marital status:      Spouse name: Not on file    Number of children: Not on file    Years of education: Not on file    Highest education level: Not on file   Occupational History    Not on file   Tobacco Use    Smoking status: Former Smoker     Types: Cigarettes     Quit date:      Years since quittin 0    Smokeless tobacco: Never Used   Vaping Use    Vaping Use: Never used   Substance and Sexual Activity    Alcohol use: Yes     Comment: social    Drug use: Yes     Types: Marijuana    Sexual activity: Not on file   Other Topics Concern    Not on file   Social History Narrative    Not on file     Social Determinants of Health     Financial Resource Strain: Not on file   Food Insecurity: Not on file   Transportation Needs: Not on file   Physical Activity: Not on file   Stress: Not on file   Social Connections: Not on file   Intimate Partner Violence: Not on file   Housing Stability: Not on file       Occupational History:      Meds/Allergies   Allergies   Allergen Reactions    Metformin Diarrhea    Penicillins Hives       Home medications:  Prior to Admission medications    Medication Sig Start Date End Date Taking?  Authorizing Provider   aspirin (ECOTRIN LOW STRENGTH) 81 mg EC tablet Take 81 mg by mouth daily   Yes Historical Provider, MD   atorvastatin (LIPITOR) 80 mg tablet take 1 tablet by mouth daily 21  Yes JEANCARLOS De La Vega   carvedilol (COREG) 6 25 mg tablet take 1 tablet by mouth twice a day with meals 9/15/21  Yes Solomon Diza MD   clopidogrel (PLAVIX) 75 mg tablet take 1 tablet by mouth daily 21  Yes JEANCARLOS De La Vega   escitalopram (LEXAPRO) 10 mg tablet take 1 tablet by mouth twice a day 21  Yes JEANCARLOS De La Vega   glipiZIDE (GLUCOTROL XL) 10 mg 24 hr tablet Take 1 tablet (10 mg total) by mouth daily 21  Yes JEANCARLOS De La Vega   glipiZIDE (GLUCOTROL XL) 5 mg 24 hr tablet take 1 tablet by mouth daily 11/30/21  Yes JEANCARLOS Hanson   hydrochlorothiazide (HYDRODIURIL) 12 5 mg tablet take 1 tablet by mouth daily 9/15/21  Yes Brayan Mitchell MD   losartan (COZAAR) 50 mg tablet take 1 tablet by mouth daily 11/30/21  Yes JEANCARLOS Hanson   MELATONIN PO Take 10 mg by mouth daily at bedtime    Yes Historical Provider, MD   traZODone (DESYREL) 50 mg tablet Take 1 tablet (50 mg total) by mouth daily at bedtime 11/4/21  Yes JEANCARLOS Hanson   zolpidem (AMBIEN) 10 mg tablet take 1 tablet by mouth at bedtime if needed for sleep 10/4/21  Yes JEANCARLOS Hanson       Vitals:   Blood pressure 122/70, pulse 68, resp  rate 18, height 5' (1 524 m), weight 83 kg (183 lb), SpO2 97 %  ,  Body mass index is 35 74 kg/m²  Physical Exam  General:  Awake alert and oriented x 3, conversant without conversational dyspnea, NAD, normal affect  HEENT:   Sclera noninjected, nonicteric OU, Nares patent,  no craniofacial abnormalities, Mucous membranes, moist, no oral lesions, normal dentition, Mallampati class 1  NECK:  Trachea midline, no accessory muscle use, no stridor,  JVP not elevated  CARDIAC: Reg, single s1/S2, no m/r/g  PULM: CTA bilaterally no wheezing, rhonchi or rales  ABD: Soft nontender, nondistended, no rebound, no rigidity, no guarding  EXT: No cyanosis, no clubbing, no edema, normal capillary refill  NEURO: no focal neurologic deficits, AAOx3, moving all extremities appropriately    Labs: I have personally reviewed pertinent lab results  , ABG: No results found for: PHART, DHQ5GYR, PO2ART, OOV4PHX, A5RLJEUA, BEART, SOURCE, BNP: No results found for: BNP, CBC: No results found for: WBC, HGB, HCT, MCV, PLT, ADJUSTEDWBC, MCH, MCHC, RDW, MPV, NRBC, CMP: No results found for: SODIUM, K, CL, CO2, ANIONGAP, BUN, CREATININE, GLUCOSE, CALCIUM, AST, ALT, ALKPHOS, PROT, BILITOT, EGFR, PT/INR: No results found for: PT, INR, Troponin: No results found for: TROPONINI  Lab Results   Component Value Date WBC 8 91 10/27/2021    HGB 14 5 10/27/2021    HCT 43 5 10/27/2021    MCV 89 10/27/2021     10/27/2021      Lab Results   Component Value Date    CALCIUM 8 8 10/27/2021    K 3 5 10/27/2021    CO2 30 10/27/2021     10/27/2021    BUN 19 10/27/2021    CREATININE 0 87 10/27/2021     No results found for: IRON, TIBC, FERRITIN  No results found for: AJCSDQFF01  No results found for: FOLATE      Sleep studies:  Requested to obtain     Compliance Data  8/2017-11/2017:                                    Type of CPAP:  12 cm H2O                                   Percent usage: 72%                                   Average time used: 4 hrs 46 min                                    Leak: 13 L/Min                                   Residual AHI: 0 9        Isma Gonzalez MD  [facility]       Portions of the record may have been created with voice recognition software  Occasional wrong word or "sound a like" substitutions may have occurred due to the inherent limitations of voice recognition software  Read the chart carefully and recognize, using context, where substitutions have occurred

## 2022-01-11 NOTE — ASSESSMENT & PLAN NOTE
Seems to have Ambien that is been managed by her primary care physician    That could certainly be exacerbated by her untreated obstructive sleep apnea, recommend going back on CPAP as soon as possible

## 2022-01-11 NOTE — PATIENT INSTRUCTIONS
Sleep Apnea   AMBULATORY CARE:   Sleep apnea  is a condition that causes you to stop breathing often during sleep  Types of sleep apnea:   · Obstructive sleep apnea (GABI)  is the most common kind  The muscles and tissues around your throat relax and block air from passing through  Obesity, use of alcohol or cigarettes, or a family history are common causes  GABI may increase your risk for complications after surgery  · Central sleep apnea (CSA)  means your brain does not send signals to the muscles that control breathing  You do not take a breath even though your airway is open  Common causes include medical conditions such as heart failure, being older than 40, or use of opioids  · Complex (or mixed) sleep apnea  means you have both obstructive and central sleep apnea  Common signs and symptoms:   · Loud snoring or long pauses in breathing    · Feeling sleepy, slow, and tired during the day    · Snorting, gasping, or choking while you sleep, and waking up suddenly because of these    · Feeling irritable during the day    · Dry mouth or a headache in the mornings    · Heavy night sweating    · A hard time thinking, remembering things, or focusing on your tasks the following day    Call your local emergency number (911 in the [facility]) if:   · You have chest pain or trouble breathing  Call your doctor if:   · You have new or worsening signs or symptoms  · You have questions or concerns about your condition or care  Treatment  depends on the kind of apnea you have  · A mouth device  may be needed if you have mild sleep apnea  These are designed to keep your throat open  Ask your dentist or healthcare provider about the best mouth device for you  · A machine  may be used to help you get more air during sleep  A mask may be placed over your nose and mouth, or just your nose  The mask is hooked to the machine  You will get air through the mask      ? A continuous positive airway pressure (CPAP) machine is used to keep your airway open during sleep  The machine blows a gentle stream of air into the mask when you breathe  This helps keep your airway open so you can breathe more regularly  Extra oxygen may be given through the machine  ? A bilevel positive airway pressure (BiPAP) machine  gives air but lowers the pressure when you breathe out  ? An adaptive servo-ventilator (ASV)  is a machine that learns your usual breathing pattern  Then, it uses pressure to give you air and prevent stops in your breathing  · Surgery  to expand your airway or remove extra tissues may be needed  Surgery is usually only considered if other treatments do not work  Manage or prevent sleep apnea:   · Reach and maintain a healthy weight  Ask your healthcare provider what a healthy weight is for you  Ask him or her to help you create a safe weight loss plan if you are overweight  Even a small goal of a 10% weight loss can improve your symptoms  · Do not smoke  Nicotine and other chemicals in cigarettes and cigars can cause lung damage  Ask your healthcare provider for information if you currently smoke and need help to quit  E-cigarettes or smokeless tobacco still contain nicotine  Talk to your healthcare provider before you use these products  · Do not drink alcohol or take sedative medicine before you go to sleep  Alcohol and sedatives can relax the muscles and tissues around your throat  This can block the airflow to your lungs  · Sleep on your side or use pillows designed to prevent sleep apnea  This prevents your tongue or other tissues from blocking your throat  You can also raise the head of your bed  Follow up with your doctor or specialist as directed: You may need to have blood tests during your follow-up visits  Work with your provider to find the right breathing support equipment and settings for you  Write down your questions so you remember to ask them during your visits    © Copyright [employer] 2021 Information is for [employer] use only and may not be sold, redistributed or otherwise used for commercial purposes  All illustrations and images included in CareNotes® are the copyrighted property of A D A M , Inc  or Shweta Castrejon  The above information is an  only  It is not intended as medical advice for individual conditions or treatments  Talk to your doctor, nurse or pharmacist before following any medical regimen to see if it is safe and effective for you

## 2022-01-11 NOTE — ASSESSMENT & PLAN NOTE
· That was diagnosed about 5 years ago in an outside hospital, unfortunately we do not have records  Requested records from [employer] which is her [employer] company  · The patient is bringing her CPAP machine claiming that it has connection problem to the hose, I recheck did the tubing in the machine which seems to be functional, recommended to the patient to restart using it  I obtained her last compliance which seems to be in 2017 back then she had a minimal residual AHI of 0 9 on CPAP of 12 cm water  · Prescription for supplies tubing am asking was placed will be sent to [employer], would like to see the patient back in 3 months to review her compliance  If the patient continues to have issues with the CPAP consider repeating a sleep study to reestablish her diagnosis next visit

## 2022-01-26 DIAGNOSIS — G47.00 INSOMNIA, UNSPECIFIED TYPE: ICD-10-CM

## 2022-01-26 RX ORDER — TRAZODONE HYDROCHLORIDE 50 MG/1
TABLET ORAL
Qty: 90 TABLET | Refills: 0 | Status: SHIPPED | OUTPATIENT
Start: 2022-01-26 | End: 2022-06-13 | Stop reason: SINTOL

## 2022-02-07 ENCOUNTER — OFFICE VISIT (OUTPATIENT)
Dept: FAMILY MEDICINE CLINIC | Facility: HOSPITAL | Age: 57
End: 2022-02-07
Payer: MEDICARE

## 2022-02-07 VITALS
WEIGHT: 188.4 LBS | BODY MASS INDEX: 36.99 KG/M2 | SYSTOLIC BLOOD PRESSURE: 124 MMHG | TEMPERATURE: 98.4 F | HEART RATE: 65 BPM | HEIGHT: 60 IN | DIASTOLIC BLOOD PRESSURE: 72 MMHG | OXYGEN SATURATION: 98 %

## 2022-02-07 DIAGNOSIS — G47.37 CENTRAL SLEEP APNEA DUE TO MEDICAL CONDITION: ICD-10-CM

## 2022-02-07 DIAGNOSIS — Z11.59 NEED FOR HEPATITIS C SCREENING TEST: ICD-10-CM

## 2022-02-07 DIAGNOSIS — I25.810 CORONARY ARTERY DISEASE INVOLVING CORONARY BYPASS GRAFT OF NATIVE HEART WITHOUT ANGINA PECTORIS: ICD-10-CM

## 2022-02-07 DIAGNOSIS — E78.2 MIXED HYPERLIPIDEMIA: ICD-10-CM

## 2022-02-07 DIAGNOSIS — Z00.00 ENCOUNTER FOR SUBSEQUENT ANNUAL WELLNESS VISIT IN MEDICARE PATIENT: ICD-10-CM

## 2022-02-07 DIAGNOSIS — I10 PRIMARY HYPERTENSION: ICD-10-CM

## 2022-02-07 DIAGNOSIS — E11.65 TYPE 2 DIABETES MELLITUS WITH HYPERGLYCEMIA, WITHOUT LONG-TERM CURRENT USE OF INSULIN (HCC): Primary | ICD-10-CM

## 2022-02-07 DIAGNOSIS — G47.00 INSOMNIA, UNSPECIFIED TYPE: ICD-10-CM

## 2022-02-07 DIAGNOSIS — F33.9 DEPRESSION, RECURRENT (HCC): ICD-10-CM

## 2022-02-07 PROBLEM — R06.00 DYSPNEA ON EXERTION: Status: RESOLVED | Noted: 2019-11-12 | Resolved: 2022-02-07

## 2022-02-07 PROBLEM — R06.09 DYSPNEA ON EXERTION: Status: RESOLVED | Noted: 2019-11-12 | Resolved: 2022-02-07

## 2022-02-07 LAB — SL AMB POCT HEMOGLOBIN AIC: 8.2 (ref ?–6.5)

## 2022-02-07 PROCEDURE — G0439 PPPS, SUBSEQ VISIT: HCPCS | Performed by: NURSE PRACTITIONER

## 2022-02-07 PROCEDURE — 99214 OFFICE O/P EST MOD 30 MIN: CPT | Performed by: NURSE PRACTITIONER

## 2022-02-07 PROCEDURE — 83036 HEMOGLOBIN GLYCOSYLATED A1C: CPT | Performed by: NURSE PRACTITIONER

## 2022-02-07 RX ORDER — ZOLPIDEM TARTRATE 10 MG/1
10 TABLET ORAL
Qty: 30 TABLET | Refills: 1 | Status: SHIPPED | OUTPATIENT
Start: 2022-02-07 | End: 2022-04-11

## 2022-02-07 NOTE — ASSESSMENT & PLAN NOTE
Plan to stop taking trazodone due to pt reporting increased bad dreams and waking up feeling sad due to dreams  Resume taking ambien 10 mg nightly as she requests - PDMP reviewed and refill issued  Educated on the importance of continued CPAP use with taking ambien

## 2022-02-07 NOTE — ASSESSMENT & PLAN NOTE
BP well controlled on current HCTZ, coreg and losartan  Continue current doses as RX'd  Continue checking BP at home     Return in 3 months for next BP check

## 2022-02-07 NOTE — ASSESSMENT & PLAN NOTE
Lab Results   Component Value Date    HGBA1C 8 2 (A) 02/07/2022     A1C in office higher than previous - pt neglected to increase glimiperide as advised in Nov and has been taking 5mg daily  Recommend she increase to 10mg daily and she voices understanding  States she will schedule eye exam    Foot exam UTD  Continue ARB daily  Return in 3 months for next f/u - update labs as ordered beforehand

## 2022-02-07 NOTE — PROGRESS NOTES
Assessment/Plan:    Sleep apnea  Had recent apt with sleep study in 1/2022  States equipment is working properly has has f/u with sleep study in 2 months  Encouraged to continue using CPAP machine every night and keep f/u with pulmonary as scheduled  Coronary artery disease involving coronary bypass graft of native heart without angina pectoris  Compliant with ASA and Plavix  Due for cardiology f/u in April, plans to make apt soon  Hypertension  BP well controlled on current HCTZ, coreg and losartan  Continue current doses as RX'd  Continue checking BP at home  Return in 3 months for next BP check    BMI 36 0-36 9,adult  Encouraged healthy diet, low in sugars/carbs, as well as exercise routine  Depression, recurrent ([facility] [address] )  Mood stable with PHQ score of 5  Continue with current dose of lexapro  Will recheck at next f/u in 3 months  Hyperlipidemia  FLP at goal compliant w/high intensity statin  Plan to recheck FLP next in 3 months as ordered    Insomnia  Plan to stop taking trazodone due to pt reporting increased bad dreams and waking up feeling sad due to dreams  Resume taking ambien 10 mg nightly as she requests - PDMP reviewed and refill issued  Educated on the importance of continued CPAP use with taking ambien  DM (diabetes mellitus) ([facility] [address] )    Lab Results   Component Value Date    HGBA1C 8 2 (A) 02/07/2022     A1C in office higher than previous - pt neglected to increase glimiperide as advised in Nov and has been taking 5mg daily  Recommend she increase to 10mg daily and she voices understanding  States she will schedule eye exam    Foot exam UTD  Continue ARB daily  Return in 3 months for next f/u - update labs as ordered beforehand  Diagnoses and all orders for this visit:    Type 2 diabetes mellitus with hyperglycemia, without long-term current use of insulin (HCC)  Comments:  HgbA1C increased from 7 7 to 8 2  Begin taking 10mg daily glipizide as previously RX'd  Encouraged healthy diet and exercise regimen  Orders:  -     Comprehensive metabolic panel; Future  -     Hemoglobin A1C; Future  -     POCT hemoglobin A1c    Central sleep apnea due to medical condition    Coronary artery disease involving coronary bypass graft of native heart without angina pectoris    Primary hypertension    Depression, recurrent (HCC)    Mixed hyperlipidemia  -     Lipid panel; Future    Need for hepatitis C screening test  -     Hepatitis C antibody; Future    Encounter for subsequent annual wellness visit in Medicare patient    Insomnia, unspecified type  -     zolpidem (AMBIEN) 10 mg tablet; Take 1 tablet (10 mg total) by mouth daily at bedtime as needed for sleep      AWV updated      Subjective:      Patient ID: Ismael Marie is a 64 y o  female  Here for routine follow up  Pt switched from Ambien to trazodone about 2-3 months ago  Feels like it is taking longer to fall asleep, and is having sad dreams about things she cannot change in her past  Is waking up sad from her dreams  Sleeping about 11 hrs each night  Has been wearing her CPAP almost every night  Has not started new dose of glipizide as advised in November, still taking 5 mg daily  The following portions of the patient's history were reviewed and updated as appropriate: allergies, current medications, past family history, past medical history, past social history, past surgical history and problem list     Review of Systems   Constitutional: Negative  HENT: Negative  Eyes: Negative  Respiratory: Positive for cough (dry cough) and shortness of breath (feels heaviness with her breathing with activity and sometimes at rest  )  Negative for chest tightness and wheezing  Cardiovascular: Negative for chest pain, palpitations and leg swelling  Gastrointestinal: Negative for blood in stool, constipation, diarrhea, nausea and vomiting  Endocrine: Negative  Genitourinary: Negative      Musculoskeletal: Negative  Skin: Negative  Allergic/Immunologic: Negative  Neurological: Negative  Negative for dizziness, weakness, light-headedness and headaches  Hematological: Negative  Psychiatric/Behavioral: Positive for sleep disturbance  Negative for dysphoric mood  The patient is not nervous/anxious  Objective:      /72 (Patient Position: Sitting, Cuff Size: Large)   Pulse 65   Temp 98 4 °F (36 9 °C) (Tympanic)   Ht 5' (1 524 m)   Wt 85 5 kg (188 lb 6 4 oz)   SpO2 98%   BMI 36 79 kg/m²          Physical Exam  Vitals reviewed  Constitutional:       General: She is not in acute distress  Appearance: Normal appearance  She is obese  She is not ill-appearing or diaphoretic  HENT:      Head: Normocephalic and atraumatic  Right Ear: Tympanic membrane, ear canal and external ear normal  There is no impacted cerumen  Left Ear: Tympanic membrane, ear canal and external ear normal  There is no impacted cerumen  Eyes:      General: No scleral icterus  Conjunctiva/sclera: Conjunctivae normal    Neck:      Vascular: No carotid bruit  Cardiovascular:      Rate and Rhythm: Normal rate and regular rhythm  Pulses: Normal pulses  Heart sounds: Normal heart sounds  Pulmonary:      Effort: Pulmonary effort is normal  No respiratory distress  Breath sounds: Normal breath sounds  No wheezing  Chest:      Chest wall: No tenderness  Abdominal:      General: Bowel sounds are normal       Palpations: Abdomen is soft  Tenderness: There is no abdominal tenderness  Musculoskeletal:         General: Tenderness (to right elbow) present  Normal range of motion  Cervical back: Normal range of motion and neck supple  No tenderness  Right lower leg: No edema  Left lower leg: No edema  Skin:     General: Skin is warm and dry  Capillary Refill: Capillary refill takes less than 2 seconds  Neurological:      General: No focal deficit present  Mental Status: She is alert and oriented to person, place, and time  Mental status is at baseline  Psychiatric:         Mood and Affect: Mood normal          Behavior: Behavior normal          Thought Content: Thought content normal          Judgment: Judgment normal            ELIANA-7 Flowsheet Screening      Most Recent Value   Over the last 2 weeks, how often have you been bothered by any of the following problems? Feeling nervous, anxious, or on edge 1   Not being able to stop or control worrying 2   Worrying too much about different things 1   Trouble relaxing 0   Being so restless that it is hard to sit still 0   Becoming easily annoyed or irritable 1   Feeling afraid as if something awful might happen 0   ELIANA-7 Total Score 5          PHQ-2/9 Depression Screening    Little interest or pleasure in doing things: 0 - not at all  Feeling down, depressed, or hopeless: 2 - more than half the days  Trouble falling or staying asleep, or sleeping too much: 0 - not at all  Feeling tired or having little energy: 0 - not at all  Poor appetite or overeatin - not at all  Feeling bad about yourself - or that you are a failure or have let yourself or your family down: 0 - not at all  Trouble concentrating on things, such as reading the newspaper or watching television: 3 - nearly every day  Moving or speaking so slowly that other people could have noticed   Or the opposite - being so fidgety or restless that you have been moving around a lot more than usual: 0 - not at all  Thoughts that you would be better off dead, or of hurting yourself in some way: 0 - not at all  PHQ-9 Score: 5   PHQ-9 Interpretation: Mild depression

## 2022-02-07 NOTE — ASSESSMENT & PLAN NOTE
Mood stable with PHQ score of 5  Continue with current dose of lexapro  Will recheck at next f/u in 3 months

## 2022-02-07 NOTE — ASSESSMENT & PLAN NOTE
Had recent apt with sleep study in 1/2022  States equipment is working properly has has f/u with sleep study in 2 months  Encouraged to continue using CPAP machine every night and keep f/u with pulmonary as scheduled

## 2022-02-07 NOTE — PROGRESS NOTES
Assessment and Plan:     Problem List Items Addressed This Visit        Endocrine    DM (diabetes mellitus) ([facility] [address] ) - Primary       Lab Results   Component Value Date    HGBA1C 8 2 (A) 02/07/2022     A1C in office higher than previous - pt neglected to increase glimiperide as advised in Nov and has been taking 5mg daily  Recommend she increase to 10mg daily and she voices understanding  States she will schedule eye exam    Foot exam UTD  Continue ARB daily  Return in 3 months for next f/u - update labs as ordered beforehand  Relevant Orders    Comprehensive metabolic panel    Hemoglobin A1C    POCT hemoglobin A1c (Completed)       Respiratory    Sleep apnea     Had recent apt with sleep study in 1/2022  States equipment is working properly has has f/u with sleep study in 2 months  Encouraged to continue using CPAP machine every night and keep f/u with pulmonary as scheduled  Cardiovascular and Mediastinum    Coronary artery disease involving coronary bypass graft of native heart without angina pectoris     Compliant with ASA and Plavix  Due for cardiology f/u in April, plans to make apt soon  Hypertension     BP well controlled on current HCTZ, coreg and losartan  Continue current doses as RX'd  Continue checking BP at home  Return in 3 months for next BP check            Other    Hyperlipidemia     FLP at goal compliant w/high intensity statin  Plan to recheck FLP next in 3 months as ordered         Relevant Orders    Lipid panel    Insomnia     Plan to stop taking trazodone due to pt reporting increased bad dreams and waking up feeling sad due to dreams  Resume taking ambien 10 mg nightly as she requests - PDMP reviewed and refill issued  Educated on the importance of continued CPAP use with taking ambien  Relevant Medications    zolpidem (AMBIEN) 10 mg tablet    Depression, recurrent (HCC)     Mood stable with PHQ score of 5  Continue with current dose of lexapro  Will recheck at next f/u in 3 months  Relevant Medications    zolpidem (AMBIEN) 10 mg tablet      Other Visit Diagnoses     Need for hepatitis C screening test        Relevant Orders    Hepatitis C antibody    Encounter for subsequent annual wellness visit in Medicare patient               Preventive health issues were discussed with patient, and age appropriate screening tests were ordered as noted in patient's After Visit Summary  Personalized health advice and appropriate referrals for health education or preventive services given if needed, as noted in patient's After Visit Summary       History of Present Illness:     Patient presents for Medicare Annual Wellness visit    Patient Care Team:  JEANCARLOS Devine as PCP - General (Family Medicine)     Problem List:     Patient Active Problem List   Diagnosis    Coronary artery disease involving native coronary artery of native heart without angina pectoris    S/P CABG x 2    Coronary artery disease involving coronary bypass graft of native heart without angina pectoris    S/P insertion of non-drug eluting coronary artery stent    Hypertension    Hyperlipidemia    Sleep apnea    DM (diabetes mellitus) ([facility] [address] )    BMI 36 0-36 9,adult    Insomnia    Depression, recurrent ([facility] [address] )      Past Medical and Surgical History:     Past Medical History:   Diagnosis Date    Circulation problem     Coronary artery disease     Depression     Diabetes ([address] )     Dyspnea on exertion 11/12/2019    Glaucoma     Hearing problem     Heart attack ([address] )     Heart failure ([address] )     Heart valve problem     High blood pressure     High cholesterol     Obesity, morbid ([address] ) 1/25/2021    Open injury of heart     Sleep apnea      Past Surgical History:   Procedure Laterality Date    CORONARY ARTERY BYPASS GRAFT  2011    CORONARY STENT PLACEMENT  2010 January    CORONARY STENT PLACEMENT  2015 June    [address]    VAGINAL DELIVERY  1989 Family History:     Family History   Problem Relation Age of Onset    Hypertension Father     Coronary artery disease Father    [facility] Sudden death Father         cardiac 2014      Social History:     Social History     Socioeconomic History    Marital status:      Spouse name: None    Number of children: None    Years of education: None    Highest education level: None   Occupational History    None   Tobacco Use    Smoking status: Former Smoker     Types: Cigarettes     Quit date:      Years since quittin 1    Smokeless tobacco: Never Used   Vaping Use    Vaping Use: Never used   Substance and Sexual Activity    Alcohol use: Yes     Comment: social    Drug use: Yes     Types: Marijuana    Sexual activity: None   Other Topics Concern    None   Social History Narrative    None     Social Determinants of Health     Financial Resource Strain: Not on file   Food Insecurity: Not on file   Transportation Needs: Not on file   Physical Activity: Not on file   Stress: Not on file   Social Connections: Not on file   Intimate Partner Violence: Not on file   Housing Stability: Not on file      Medications and Allergies:     Current Outpatient Medications   Medication Sig Dispense Refill    aspirin (ECOTRIN LOW STRENGTH) 81 mg EC tablet Take 81 mg by mouth daily      atorvastatin (LIPITOR) 80 mg tablet take 1 tablet by mouth daily 90 tablet 1    carvedilol (COREG) 6 25 mg tablet take 1 tablet by mouth twice a day with meals 180 tablet 3    clopidogrel (PLAVIX) 75 mg tablet take 1 tablet by mouth daily 90 tablet 1    escitalopram (LEXAPRO) 10 mg tablet take 1 tablet by mouth twice a day 180 tablet 1    glipiZIDE (GLUCOTROL XL) 10 mg 24 hr tablet Take 1 tablet (10 mg total) by mouth daily 90 tablet 1    hydrochlorothiazide (HYDRODIURIL) 12 5 mg tablet take 1 tablet by mouth daily 90 tablet 1    losartan (COZAAR) 50 mg tablet take 1 tablet by mouth daily 90 tablet 1    MELATONIN PO Take 10 mg by mouth daily at bedtime       traZODone (DESYREL) 50 mg tablet take 1 tablet by mouth at bedtime 90 tablet 0    zolpidem (AMBIEN) 10 mg tablet Take 1 tablet (10 mg total) by mouth daily at bedtime as needed for sleep 30 tablet 1     No current facility-administered medications for this visit  Allergies   Allergen Reactions    Metformin Diarrhea    Penicillins Hives      Immunizations: There is no immunization history on file for this patient  Health Maintenance:         Topic Date Due    Hepatitis C Screening  Never done    Cervical Cancer Screening  Never done    Breast Cancer Screening: Mammogram  Never done    Colorectal Cancer Screening  Never done    HIV Screening  Completed         Topic Date Due    COVID-19 Vaccine (1) Never done    Pneumococcal Vaccine: Pediatrics (0 to 5 Years) and At-Risk Patients (6 to 59 Years) (1 of 2 - PPSV23) Never done    DTaP,Tdap,and Td Vaccines (1 - Tdap) Never done    Influenza Vaccine (1) Never done      Medicare Health Risk Assessment:     /72 (Patient Position: Sitting, Cuff Size: Large)   Pulse 65   Temp 98 4 °F (36 9 °C) (Tympanic)   Ht 5' (1 524 m)   Wt 85 5 kg (188 lb 6 4 oz)   SpO2 98%   BMI 36 79 kg/m²      Marylee Redden is here for her Subsequent Wellness visit  Last Medicare Wellness visit information reviewed, patient interviewed and updates made to the record today  Health Risk Assessment:   Patient rates overall health as fair  Patient feels that their physical health rating is slightly worse  Patient is satisfied with their life  Eyesight was rated as slightly worse  Hearing was rated as slightly worse  Patient feels that their emotional and mental health rating is slightly better  Patients states they are never, rarely angry  Patient states they are never, rarely unusually tired/fatigued  Pain experienced in the last 7 days has been some  Patient's pain rating has been 4/10   Patient states that she has experienced no weight loss or gain in last 6 months  Depression Screening:   PHQ-9 Score: 5      Fall Risk Screening: In the past year, patient has experienced: history of falling in past year    Number of falls: 1  Injured during fall?: No    Feels unsteady when standing or walking?: No    Worried about falling?: No      Urinary Incontinence Screening:   Patient has not leaked urine accidently in the last six months  Home Safety:  Patient does not have trouble with stairs inside or outside of their home  Patient has working smoke alarms and has working carbon monoxide detector  Home safety hazards include: none  Nutrition:   Current diet is Unhealthy and Limited junk food  Medications:   Patient is currently taking over-the-counter supplements  OTC medications include: see medication list  Patient is able to manage medications  Activities of Daily Living (ADLs)/Instrumental Activities of Daily Living (IADLs):   Walk and transfer into and out of bed and chair?: Yes  Dress and groom yourself?: Yes    Bathe or shower yourself?: Yes    Feed yourself?  Yes  Do your laundry/housekeeping?: Yes  Manage your money, pay your bills and track your expenses?: Yes  Make your own meals?: Yes    Do your own shopping?: Yes    Previous Hospitalizations:   Any hospitalizations or ED visits within the last 12 months?: No      Advance Care Planning:   Living will: No    Durable POA for healthcare: No    Advanced directive: No    Five wishes given: No      PREVENTIVE SCREENINGS      Cardiovascular Screening:    General: Screening Not Indicated and History Lipid Disorder      Diabetes Screening:     General: Screening Not Indicated and History Diabetes      Colorectal Cancer Screening:     General: Patient Declines      Breast Cancer Screening:     General: Patient Declines      Cervical Cancer Screening:    General: Patient Declines      Osteoporosis Screening:    General: Screening Not Indicated      Abdominal Aortic Aneurysm (AAA) Screening: General: Screening Not Indicated      Lung Cancer Screening:     General: Screening Not Indicated      Hepatitis C Screening:      Hep C Screening Accepted: Yes      Screening, Brief Intervention, and Referral to Treatment (SBIRT)    Screening    Typical number of drinks in a week: 0    Single Item Drug Screening:  How often have you used an illegal drug (including marijuana) or a prescription medication for non-medical reasons in the past year? never    Single Item Drug Screen Score: 0  Interpretation: Negative screen for possible drug use disorder      JEANCARLOS Allen

## 2022-02-15 DIAGNOSIS — I10 ESSENTIAL HYPERTENSION: ICD-10-CM

## 2022-02-15 DIAGNOSIS — I25.10 CORONARY ARTERY DISEASE INVOLVING NATIVE CORONARY ARTERY OF NATIVE HEART WITHOUT ANGINA PECTORIS: ICD-10-CM

## 2022-02-15 RX ORDER — ATORVASTATIN CALCIUM 80 MG/1
TABLET, FILM COATED ORAL
Qty: 90 TABLET | Refills: 1 | Status: SHIPPED | OUTPATIENT
Start: 2022-02-15

## 2022-02-16 RX ORDER — HYDROCHLOROTHIAZIDE 12.5 MG/1
TABLET ORAL
Qty: 90 TABLET | Refills: 1 | Status: SHIPPED | OUTPATIENT
Start: 2022-02-16 | End: 2022-06-13 | Stop reason: SDUPTHER

## 2022-03-02 DIAGNOSIS — E11.65 TYPE 2 DIABETES MELLITUS WITH HYPERGLYCEMIA, WITHOUT LONG-TERM CURRENT USE OF INSULIN (HCC): ICD-10-CM

## 2022-03-03 RX ORDER — GLIPIZIDE 10 MG/1
TABLET, FILM COATED, EXTENDED RELEASE ORAL
Qty: 90 TABLET | Refills: 1 | Status: SHIPPED | OUTPATIENT
Start: 2022-03-03 | End: 2022-06-08

## 2022-04-04 DIAGNOSIS — G47.00 INSOMNIA, UNSPECIFIED TYPE: ICD-10-CM

## 2022-04-11 RX ORDER — ZOLPIDEM TARTRATE 10 MG/1
TABLET ORAL
Qty: 30 TABLET | Refills: 1 | Status: SHIPPED | OUTPATIENT
Start: 2022-04-11 | End: 2022-06-21

## 2022-06-03 ENCOUNTER — APPOINTMENT (OUTPATIENT)
Dept: LAB | Facility: HOSPITAL | Age: 57
End: 2022-06-03
Payer: MEDICARE

## 2022-06-03 DIAGNOSIS — E11.65 TYPE 2 DIABETES MELLITUS WITH HYPERGLYCEMIA, WITHOUT LONG-TERM CURRENT USE OF INSULIN (HCC): ICD-10-CM

## 2022-06-03 DIAGNOSIS — Z11.59 NEED FOR HEPATITIS C SCREENING TEST: ICD-10-CM

## 2022-06-03 DIAGNOSIS — E78.2 MIXED HYPERLIPIDEMIA: ICD-10-CM

## 2022-06-03 LAB
ALBUMIN SERPL BCP-MCNC: 3.5 G/DL (ref 3.5–5)
ALP SERPL-CCNC: 95 U/L (ref 46–116)
ALT SERPL W P-5'-P-CCNC: 54 U/L (ref 12–78)
ANION GAP SERPL CALCULATED.3IONS-SCNC: 2 MMOL/L (ref 4–13)
AST SERPL W P-5'-P-CCNC: 33 U/L (ref 5–45)
BILIRUB SERPL-MCNC: 0.66 MG/DL (ref 0.2–1)
BUN SERPL-MCNC: 16 MG/DL (ref 5–25)
CALCIUM SERPL-MCNC: 9.1 MG/DL (ref 8.3–10.1)
CHLORIDE SERPL-SCNC: 108 MMOL/L (ref 100–108)
CHOLEST SERPL-MCNC: 164 MG/DL
CO2 SERPL-SCNC: 29 MMOL/L (ref 21–32)
CREAT SERPL-MCNC: 0.95 MG/DL (ref 0.6–1.3)
EST. AVERAGE GLUCOSE BLD GHB EST-MCNC: 163 MG/DL
GFR SERPL CREATININE-BSD FRML MDRD: 67 ML/MIN/1.73SQ M
GLUCOSE SERPL-MCNC: 191 MG/DL (ref 65–140)
HBA1C MFR BLD: 7.3 %
HCV AB SER QL: NORMAL
HDLC SERPL-MCNC: 35 MG/DL
LDLC SERPL CALC-MCNC: 99 MG/DL (ref 0–100)
NONHDLC SERPL-MCNC: 129 MG/DL
POTASSIUM SERPL-SCNC: 4.1 MMOL/L (ref 3.5–5.3)
PROT SERPL-MCNC: 7.2 G/DL (ref 6.4–8.2)
SODIUM SERPL-SCNC: 139 MMOL/L (ref 136–145)
TRIGL SERPL-MCNC: 149 MG/DL

## 2022-06-03 PROCEDURE — 86803 HEPATITIS C AB TEST: CPT

## 2022-06-03 PROCEDURE — 80061 LIPID PANEL: CPT

## 2022-06-03 PROCEDURE — 80053 COMPREHEN METABOLIC PANEL: CPT

## 2022-06-03 PROCEDURE — 83036 HEMOGLOBIN GLYCOSYLATED A1C: CPT

## 2022-06-03 PROCEDURE — 36415 COLL VENOUS BLD VENIPUNCTURE: CPT

## 2022-06-08 ENCOUNTER — RA CDI HCC (OUTPATIENT)
Dept: OTHER | Facility: HOSPITAL | Age: 57
End: 2022-06-08

## 2022-06-08 NOTE — PROGRESS NOTES
[facility] [address]  coding opportunities       Chart reviewed, no opportunity found: CHART REVIEWED, NO OPPORTUNITY FOUND        Patients Insurance     Medicare Insurance: Medicare normal (ped)...

## 2022-06-13 ENCOUNTER — OFFICE VISIT (OUTPATIENT)
Dept: FAMILY MEDICINE CLINIC | Facility: HOSPITAL | Age: 57
End: 2022-06-13
Payer: MEDICARE

## 2022-06-13 VITALS
HEIGHT: 60 IN | OXYGEN SATURATION: 98 % | DIASTOLIC BLOOD PRESSURE: 92 MMHG | HEART RATE: 70 BPM | BODY MASS INDEX: 36.52 KG/M2 | WEIGHT: 186 LBS | TEMPERATURE: 98.2 F | SYSTOLIC BLOOD PRESSURE: 140 MMHG

## 2022-06-13 DIAGNOSIS — I10 PRIMARY HYPERTENSION: Primary | ICD-10-CM

## 2022-06-13 DIAGNOSIS — I25.10 CORONARY ARTERY DISEASE INVOLVING NATIVE CORONARY ARTERY OF NATIVE HEART WITHOUT ANGINA PECTORIS: ICD-10-CM

## 2022-06-13 DIAGNOSIS — I10 ESSENTIAL HYPERTENSION: ICD-10-CM

## 2022-06-13 DIAGNOSIS — F33.9 DEPRESSION, RECURRENT (HCC): ICD-10-CM

## 2022-06-13 DIAGNOSIS — E11.65 TYPE 2 DIABETES MELLITUS WITH HYPERGLYCEMIA, WITHOUT LONG-TERM CURRENT USE OF INSULIN (HCC): ICD-10-CM

## 2022-06-13 DIAGNOSIS — E78.2 MIXED HYPERLIPIDEMIA: ICD-10-CM

## 2022-06-13 PROCEDURE — 99214 OFFICE O/P EST MOD 30 MIN: CPT | Performed by: NURSE PRACTITIONER

## 2022-06-13 RX ORDER — HYDROCHLOROTHIAZIDE 12.5 MG/1
12.5 TABLET ORAL DAILY
Qty: 90 TABLET | Refills: 1 | Status: SHIPPED | OUTPATIENT
Start: 2022-06-13

## 2022-06-13 NOTE — ASSESSMENT & PLAN NOTE
A1C decreased to 7 3  Compliant with 10 mg glipizide daily  States she will schedule eye exam    Foot exam updated today  Continue ARB daily  Return in 6 months for next f/u - update labs as ordered beforehand    Lab Results   Component Value Date    HGBA1C 7 3 (H) 06/03/2022

## 2022-06-13 NOTE — PATIENT INSTRUCTIONS
Obesity   AMBULATORY CARE:   Obesity  means your body mass index (BMI) is greater than 30  Your healthcare provider will use your height and weight to measure your BMI  The risks of obesity include  many health problems, including injuries or physical disability  · Diabetes (high blood sugar level)    · High blood pressure or high cholesterol    · Heart disease    · Stroke    · Gallbladder or liver disease    · Cancer of the colon, breast, prostate, liver, or kidney    · Sleep apnea    · Arthritis or gout    Screening  is done to check for health conditions before you have signs or symptoms  If you are 28to 79years old, your blood sugar level may be checked every 3 years for signs of prediabetes or diabetes  Your healthcare provider will check your blood pressure at each visit  High blood pressure can lead to a stroke or other problems  Your provider may check for signs of heart disease, cancer, or other health problems  Seek care immediately if:   · You have a severe headache, confusion, or difficulty speaking  · You have weakness on one side of your body  · You have chest pain, sweating, or shortness of breath  Call your doctor if:   · You have symptoms of gallbladder or liver disease, such as pain in your upper abdomen  · You have knee or hip pain and discomfort while walking  · You have symptoms of diabetes, such as intense hunger and thirst, and frequent urination  · You have symptoms of sleep apnea, such as snoring or daytime sleepiness  · You have questions or concerns about your condition or care  Treatment for obesity  focuses on helping you lose weight to improve your health  Even a small decrease in BMI can reduce the risk for many health problems  Your healthcare provider will help you set a weight-loss goal   · Lifestyle changes  are the first step in treating obesity  These include making healthy food choices and getting regular physical activity   Your healthcare provider may suggest a weight-loss program that involves coaching, education, and therapy  · Medicine  may help you lose weight when it is used with a healthy foods and physical activity  · Surgery  can help you lose weight if you are very obese and have other health problems  There are several types of weight-loss surgery  Ask your healthcare provider for more information  Tips for safe weight loss:   · Set small, realistic goals  An example of a small goal is to walk for 20 minutes 5 days a week  Anther goal is to lose 5% of your body weight  · Tell friends, family members, and coworkers about your goals  and ask for their support  Ask a friend to lose weight with you, or join a weight-loss support group  · Identify foods or triggers that may cause you to overeat , and find ways to avoid them  Remove tempting high-calorie foods from your home and workplace  Place a bowl of fresh fruit on your kitchen counter  If stress causes you to eat, then find other ways to cope with stress  A counselor or therapist may be able to help you  · Keep a diary to track what you eat and drink  Also write down how many minutes of physical activity you do each day  Weigh yourself once a week and record it in your diary  Eating changes: You will need to eat 500 to 1,000 fewer calories each day than you currently eat to lose 1 to 2 pounds a week  The following changes will help you cut calories:  · Eat smaller portions  Use small plates, no larger than 9 inches in diameter  Fill your plate half full of fruits and vegetables  Measure your food using measuring cups until you know what a serving size looks like  · Eat 3 meals and 1 or 2 snacks each day  Plan your meals in advance  [employer] [employer] and eat at home most of the time  Eat slowly  Do not skip meals  Skipping meals can lead to overeating later in the day  This can make it harder for you to lose weight   Talk with a dietitian to help you make a meal plan and schedule that is right for you  · Eat fruits and vegetables at every meal   They are low in calories and high in fiber, which makes you feel full  Do not add butter, margarine, or cream sauce to vegetables  Use herbs to season steamed vegetables  · Eat less fat and fewer fried foods  Eat more baked or grilled chicken and fish  These protein sources are lower in calories and fat than red meat  Limit fast food  Dress your salads with olive oil and vinegar instead of bottled dressing  · Limit the amount of sugar you eat  Do not drink sugary beverages  Limit alcohol  Activity changes:  Physical activity is good for your body in many ways  It helps you burn calories and build strong muscles  It decreases stress and depression, and improves your mood  It can also help you sleep better  Talk to your healthcare provider before you begin an exercise program   · Exercise for at least 30 minutes 5 days a week  Start slowly  Set aside time each day for physical activity that you enjoy and that is convenient for you  It is best to do both weight training and an activity that increases your heart rate, such as walking, bicycling, or swimming  · Find ways to be more active  Do yard work and housecleaning  Walk up the stairs instead of using elevators  Spend your leisure time going to events that require walking, such as outdoor festivals or fairs  This extra physical activity can help you lose weight and keep it off  Follow up with your doctor as directed: You may need to meet with a dietitian  Write down your questions so you remember to ask them during your visits  © Copyright [employer] 2022 Information is for End User's use only and may not be sold, redistributed or otherwise used for commercial purposes  All illustrations and images included in CareNotes® are the copyrighted property of [employer]  or Shweta Hagan   The above information is an  only   It is not intended as medical advice for individual conditions or treatments  Talk to your doctor, nurse or pharmacist before following any medical regimen to see if it is safe and effective for you

## 2022-06-13 NOTE — ASSESSMENT & PLAN NOTE
BP borderline in office today  Has been off of HCTZ for 2 months since ran out  Refilled HCTZ and encouraged to begin taking  Continue current doses of coreg and losartan as RX'd  Continue checking BP at home     Return in 3 months for next BP check

## 2022-06-13 NOTE — PROGRESS NOTES
Assessment/Plan:    DM (diabetes mellitus) ([place] [id number] )  A1C decreased to 7 3  Compliant with 10 mg glipizide daily  States she will schedule eye exam    Foot exam updated today  Continue ARB daily  Return in 6 months for next f/u - update labs as ordered beforehand  Lab Results   Component Value Date    HGBA1C 7 3 (H) 06/03/2022       Coronary artery disease involving native coronary artery of native heart without angina pectoris  Compliant with daily ASA and plavix  Clinically stable and asymptomatic  Cardiology referral placed to update visit, has been over 1 yr since last seen  Hypertension  BP borderline in office today  Has been off of HCTZ for 2 months since ran out  Refilled HCTZ and encouraged to begin taking  Continue current doses of coreg and losartan as RX'd  Continue checking BP at home  Return in 3 months for next BP check    Depression, recurrent ([place] [id number] )  PHQ increased to 10  Continue same dose of lexapro daily  Encouraged to pursue therapy  F/u in 3 months for next med check  Call sooner with any acute mood changes/concerns  Hyperlipidemia  FLP at goal compliant w/high intensity statin  Plan to recheck FLP next in 3 months as ordered    BMI 36 0-36 9,adult  Weight loss encouraged w/healthy diet and regular exercise efforts       Diagnoses and all orders for this visit:    Primary hypertension  -     Comprehensive metabolic panel; Future    Coronary artery disease involving native coronary artery of native heart without angina pectoris  -     Ambulatory Referral to Cardiology; Future  -     Comprehensive metabolic panel; Future    Essential hypertension  -     hydrochlorothiazide (HYDRODIURIL) 12 5 mg tablet; Take 1 tablet (12 5 mg total) by mouth daily  -     Comprehensive metabolic panel; Future    Depression, recurrent ([place] [id number] )    Mixed hyperlipidemia  -     Comprehensive metabolic panel; Future  -     Lipid panel;  Future  -     CBC and differential; Future  -     TSH, 3rd generation with Free T4 reflex; Future    Type 2 diabetes mellitus with hyperglycemia, without long-term current use of insulin (HCC)  -     Comprehensive metabolic panel; Future  -     Hemoglobin A1C; Future  -     CBC and differential; Future    BMI 36 0-36 9,adult      she declines preventative services - pap smear/gyn exam, mammo, and colon screening      Subjective:      Patient ID: Steve Landon is a 64 y o  female  Here for routine follow up  Has been taking the 10 mg glipizide daily since last visit  Trying to walk more and eat healthy  Has stopped working about 1 month ago  Has noticed being more emotional and crying easily since stopping working  Compliant with daily lexapro  Taking ambien nightly with good benefit  Has not f/u with cardiology in over 1 year  Has been out of HCTZ for about 2 months  Has not noticed any swelling or issues with breathing  Does not check BP at home regularly  Reports occasional tingling in feet, worse after walking  The following portions of the patient's history were reviewed and updated as appropriate: allergies, current medications, past family history, past medical history, past social history, past surgical history and problem list     Review of Systems   Constitutional: Negative  HENT: Negative  Respiratory: Negative  Cardiovascular: Negative  Gastrointestinal: Positive for diarrhea (with stress)  Genitourinary: Negative  Skin: Negative  Neurological: Positive for numbness (tingling in feet)  Negative for dizziness, weakness, light-headedness and headaches  Psychiatric/Behavioral: Negative for agitation, self-injury, sleep disturbance and suicidal ideas  The patient is nervous/anxious (feels emotional and crying easily  )  Objective:      /92   Pulse 70   Temp 98 2 °F (36 8 °C)   Ht 5' (1 524 m)   Wt 84 4 kg (186 lb)   SpO2 98%   BMI 36 33 kg/m²          Physical Exam  Vitals reviewed     Constitutional:       General: She is not in acute distress  Appearance: Normal appearance  She is obese  She is not ill-appearing or diaphoretic  HENT:      Head: Normocephalic and atraumatic  Eyes:      General: No scleral icterus  Conjunctiva/sclera: Conjunctivae normal    Neck:      Thyroid: No thyromegaly  Vascular: No carotid bruit  Cardiovascular:      Rate and Rhythm: Normal rate and regular rhythm  Pulses: Pulses are weak  Dorsalis pedis pulses are 1+ on the right side and 1+ on the left side  Posterior tibial pulses are 1+ on the right side and 1+ on the left side  Heart sounds: Normal heart sounds  No murmur heard  Pulmonary:      Effort: Pulmonary effort is normal  No respiratory distress  Breath sounds: Normal breath sounds  No wheezing  Chest:      Chest wall: No tenderness  Abdominal:      General: Bowel sounds are normal       Palpations: Abdomen is soft  Tenderness: There is no abdominal tenderness  Musculoskeletal:         General: Normal range of motion  Cervical back: Normal range of motion and neck supple  Right lower leg: No edema  Left lower leg: No edema  Feet:      Right foot:      Skin integrity: No ulcer, skin breakdown, erythema, warmth, callus or dry skin  Left foot:      Skin integrity: No ulcer, skin breakdown, erythema, warmth, callus or dry skin  Skin:     General: Skin is warm and dry  Capillary Refill: Capillary refill takes less than 2 seconds  Neurological:      General: No focal deficit present  Mental Status: She is alert and oriented to person, place, and time  Mental status is at baseline  Psychiatric:         Mood and Affect: Mood normal          Behavior: Behavior normal          Thought Content:  Thought content normal          Judgment: Judgment normal          PHQ-2/9 Depression Screening    Little interest or pleasure in doing things: 3 - nearly every day  Feeling down, depressed, or hopeless: 3 - nearly every day  Trouble falling or staying asleep, or sleeping too much: 0 - not at all  Feeling tired or having little energy: 2 - more than half the days  Poor appetite or overeatin - not at all  Feeling bad about yourself - or that you are a failure or have let yourself or your family down: 0 - not at all  Trouble concentrating on things, such as reading the newspaper or watching television: 0 - not at all  Moving or speaking so slowly that other people could have noticed  Or the opposite - being so fidgety or restless that you have been moving around a lot more than usual: 2 - more than half the days  Thoughts that you would be better off dead, or of hurting yourself in some way: 0 - not at all  PHQ-9 Score: 10   PHQ-9 Interpretation: Moderate depression        ELIANA-7 Flowsheet Screening    Flowsheet Row Most Recent Value   Over the last 2 weeks, how often have you been bothered by any of the following problems? Feeling nervous, anxious, or on edge 2   Not being able to stop or control worrying 3   Worrying too much about different things 3   Trouble relaxing 1   Being so restless that it is hard to sit still 0   Becoming easily annoyed or irritable 1   Feeling afraid as if something awful might happen 0   ELIANA-7 Total Score 10            Patient's shoes and socks removed  Right Foot/Ankle   Right Foot Inspection  Skin Exam: skin normal and skin intact  No dry skin, no warmth, no callus, no erythema, no maceration, no abnormal color, no pre-ulcer, no ulcer and no callus  Toe Exam: ROM and strength within normal limits  Sensory   Vibration: intact  Monofilament testing: intact    Vascular  The right DP pulse is 1+  The right PT pulse is 1+  Left Foot/Ankle  Left Foot Inspection  Skin Exam: skin normal and skin intact  No dry skin, no warmth, no erythema, no maceration, normal color, no pre-ulcer, no ulcer and no callus  Toe Exam: ROM and strength within normal limits       Sensory   Vibration: intact  Monofilament testing: intact    Vascular  The left DP pulse is 1+  The left PT pulse is 1+  Assign Risk Category  No deformity present  No loss of protective sensation  Weak pulses  Risk: 2    BMI Counseling: Body mass index is 36 33 kg/m²  The BMI is above normal  Nutrition recommendations include 3-5 servings of fruits/vegetables daily, moderation in carbohydrate intake and reducing intake of cholesterol  Exercise recommendations include exercising 3-5 times per week

## 2022-06-13 NOTE — ASSESSMENT & PLAN NOTE
PHQ increased to 10  Continue same dose of lexapro daily  Encouraged to pursue therapy  F/u in 3 months for next med check  Call sooner with any acute mood changes/concerns

## 2022-06-13 NOTE — ASSESSMENT & PLAN NOTE
Compliant with daily ASA and plavix  Clinically stable and asymptomatic  Cardiology referral placed to update visit, has been over 1 yr since last seen

## 2022-06-20 DIAGNOSIS — G47.00 INSOMNIA, UNSPECIFIED TYPE: ICD-10-CM

## 2022-06-21 RX ORDER — ZOLPIDEM TARTRATE 10 MG/1
TABLET ORAL
Qty: 30 TABLET | Refills: 1 | Status: SHIPPED | OUTPATIENT
Start: 2022-06-21

## 2022-06-24 DIAGNOSIS — F32.A DEPRESSION, UNSPECIFIED DEPRESSION TYPE: ICD-10-CM

## 2022-06-24 RX ORDER — ESCITALOPRAM OXALATE 10 MG/1
10 TABLET ORAL 2 TIMES DAILY
Qty: 180 TABLET | Refills: 1 | Status: SHIPPED | OUTPATIENT
Start: 2022-06-24

## 2022-07-12 DIAGNOSIS — I25.10 CORONARY ARTERY DISEASE INVOLVING NATIVE CORONARY ARTERY OF NATIVE HEART WITHOUT ANGINA PECTORIS: ICD-10-CM

## 2022-07-13 RX ORDER — CLOPIDOGREL BISULFATE 75 MG/1
75 TABLET ORAL DAILY
Qty: 30 TABLET | Refills: 1 | Status: SHIPPED | OUTPATIENT
Start: 2022-07-13 | End: 2022-08-08

## 2022-07-25 ENCOUNTER — TELEPHONE (OUTPATIENT)
Dept: FAMILY MEDICINE CLINIC | Facility: HOSPITAL | Age: 57
End: 2022-07-25

## 2022-07-25 NOTE — TELEPHONE ENCOUNTER
Pt called on med line asking for refill on Carvedilol 6 25 mg--she said she takes it 1 daily, but the current rx in chart states bid  Uses [facility] [address]

## 2022-07-26 ENCOUNTER — TELEPHONE (OUTPATIENT)
Dept: CARDIOLOGY CLINIC | Facility: CLINIC | Age: 57
End: 2022-07-26

## 2022-07-26 ENCOUNTER — TELEPHONE (OUTPATIENT)
Dept: FAMILY MEDICINE CLINIC | Facility: HOSPITAL | Age: 57
End: 2022-07-26

## 2022-07-26 DIAGNOSIS — I10 ESSENTIAL HYPERTENSION: ICD-10-CM

## 2022-07-26 RX ORDER — CARVEDILOL 6.25 MG/1
6.25 TABLET ORAL 2 TIMES DAILY WITH MEALS
Qty: 180 TABLET | Refills: 0 | Status: SHIPPED | OUTPATIENT
Start: 2022-07-26 | End: 2022-08-16 | Stop reason: SDUPTHER

## 2022-07-26 NOTE — TELEPHONE ENCOUNTER
Kaycee from [facility]) Cardiology calling from [phone] regarding a problem with the patient's Coreg

## 2022-07-26 NOTE — TELEPHONE ENCOUNTER
PCP office returned call  Advised that we have not seen pt since 2020 and are unaware of how she may have been taking her carvedilol however it is prescribed as twice daily  No telephone encounters in the intervening time to indicate this was changed  PCP office will let us know if they will fill until her next OV here in Aug  If not, we will courtesy fill to appt date only

## 2022-07-26 NOTE — TELEPHONE ENCOUNTER
PCP office called (pt reached out to them) regarding Rx refill of COREG (prescribed by Dr Christina Roth)  She mentioned reviewing the dosing instructions w/the patient  Pt says she takes 1 tablet once a day  Should be taken 1 tablet twice a day  Thank you

## 2022-07-26 NOTE — TELEPHONE ENCOUNTER
Spoke to cardiology, they are going to send refill request to Dr Lorrie Ryan and will review directions

## 2022-07-26 NOTE — TELEPHONE ENCOUNTER
Spoke to pt, she states [facility] has been filling medication, requested we reach out to Dr Rhonda Pedersen to insure correct dose, will contact office later

## 2022-07-26 NOTE — TELEPHONE ENCOUNTER
Please let patient know the cardiology office confirmed she should be taking the carvedilol twice daily as Dr Russ Stover prescribed last Sept  Please let her know I am issuing refill to hold her over until she sees them next month, and they will then continue refill at that time

## 2022-07-27 ENCOUNTER — APPOINTMENT (EMERGENCY)
Dept: INTERVENTIONAL RADIOLOGY/VASCULAR | Facility: HOSPITAL | Age: 57
DRG: 854 | End: 2022-07-27
Attending: RADIOLOGY
Payer: MEDICARE

## 2022-07-27 ENCOUNTER — APPOINTMENT (EMERGENCY)
Dept: CT IMAGING | Facility: HOSPITAL | Age: 57
DRG: 854 | End: 2022-07-27
Payer: MEDICARE

## 2022-07-27 ENCOUNTER — HOSPITAL ENCOUNTER (INPATIENT)
Facility: HOSPITAL | Age: 57
LOS: 4 days | Discharge: HOME HEALTH | DRG: 854 | End: 2022-07-31
Attending: EMERGENCY MEDICINE | Admitting: INTERNAL MEDICINE
Payer: MEDICARE

## 2022-07-27 DIAGNOSIS — N13.4 HYDROURETER ON LEFT: ICD-10-CM

## 2022-07-27 DIAGNOSIS — N39.0 UTI (URINARY TRACT INFECTION): ICD-10-CM

## 2022-07-27 DIAGNOSIS — R65.20 SEVERE SEPSIS (HCC): Primary | ICD-10-CM

## 2022-07-27 DIAGNOSIS — N13.39 OTHER HYDRONEPHROSIS: ICD-10-CM

## 2022-07-27 DIAGNOSIS — A41.9 SEVERE SEPSIS (HCC): Primary | ICD-10-CM

## 2022-07-27 DIAGNOSIS — N12 EMPHYSEMATOUS PYELONEPHRITIS OF LEFT KIDNEY: ICD-10-CM

## 2022-07-27 LAB
ALBUMIN SERPL BCP-MCNC: 3.6 G/DL (ref 3.5–5)
ALP SERPL-CCNC: 108 U/L (ref 46–116)
ALT SERPL W P-5'-P-CCNC: 31 U/L (ref 12–78)
ANION GAP SERPL CALCULATED.3IONS-SCNC: 8 MMOL/L (ref 4–13)
ANION GAP SERPL CALCULATED.3IONS-SCNC: 9 MMOL/L (ref 4–13)
APTT PPP: 31 SECONDS (ref 23–37)
AST SERPL W P-5'-P-CCNC: 15 U/L (ref 5–45)
ATRIAL RATE: 83 BPM
BACTERIA UR QL AUTO: ABNORMAL /HPF
BASOPHILS # BLD AUTO: 0.05 THOUSANDS/ΜL (ref 0–0.1)
BASOPHILS # BLD MANUAL: 0 THOUSAND/UL (ref 0–0.1)
BASOPHILS NFR BLD AUTO: 0 % (ref 0–1)
BASOPHILS NFR MAR MANUAL: 0 % (ref 0–1)
BILIRUB SERPL-MCNC: 0.8 MG/DL (ref 0.2–1)
BILIRUB UR QL STRIP: NEGATIVE
BUN SERPL-MCNC: 11 MG/DL (ref 5–25)
BUN SERPL-MCNC: 11 MG/DL (ref 5–25)
CALCIUM SERPL-MCNC: 7.3 MG/DL (ref 8.3–10.1)
CALCIUM SERPL-MCNC: 9.2 MG/DL (ref 8.3–10.1)
CHLORIDE SERPL-SCNC: 103 MMOL/L (ref 96–108)
CHLORIDE SERPL-SCNC: 96 MMOL/L (ref 96–108)
CLARITY UR: ABNORMAL
CO2 SERPL-SCNC: 26 MMOL/L (ref 21–32)
CO2 SERPL-SCNC: 27 MMOL/L (ref 21–32)
COLOR UR: YELLOW
CREAT SERPL-MCNC: 1.14 MG/DL (ref 0.6–1.3)
CREAT SERPL-MCNC: 1.16 MG/DL (ref 0.6–1.3)
EOSINOPHIL # BLD AUTO: 0.01 THOUSAND/ΜL (ref 0–0.61)
EOSINOPHIL # BLD MANUAL: 0 THOUSAND/UL (ref 0–0.4)
EOSINOPHIL NFR BLD AUTO: 0 % (ref 0–6)
EOSINOPHIL NFR BLD MANUAL: 0 % (ref 0–6)
ERYTHROCYTE [DISTWIDTH] IN BLOOD BY AUTOMATED COUNT: 12.3 % (ref 11.6–15.1)
ERYTHROCYTE [DISTWIDTH] IN BLOOD BY AUTOMATED COUNT: 12.6 % (ref 11.6–15.1)
GFR SERPL CREATININE-BSD FRML MDRD: 52 ML/MIN/1.73SQ M
GFR SERPL CREATININE-BSD FRML MDRD: 53 ML/MIN/1.73SQ M
GLUCOSE SERPL-MCNC: 196 MG/DL (ref 65–140)
GLUCOSE SERPL-MCNC: 196 MG/DL (ref 65–140)
GLUCOSE SERPL-MCNC: 367 MG/DL (ref 65–140)
GLUCOSE UR STRIP-MCNC: ABNORMAL MG/DL
HCT VFR BLD AUTO: 34.5 % (ref 34.8–46.1)
HCT VFR BLD AUTO: 43.2 % (ref 34.8–46.1)
HGB BLD-MCNC: 11.5 G/DL (ref 11.5–15.4)
HGB BLD-MCNC: 14.8 G/DL (ref 11.5–15.4)
HGB UR QL STRIP.AUTO: ABNORMAL
IMM GRANULOCYTES # BLD AUTO: 0.12 THOUSAND/UL (ref 0–0.2)
IMM GRANULOCYTES NFR BLD AUTO: 1 % (ref 0–2)
INR PPP: 0.95 (ref 0.84–1.19)
KETONES UR STRIP-MCNC: NEGATIVE MG/DL
LACTATE SERPL-SCNC: 2.7 MMOL/L (ref 0.5–2)
LACTATE SERPL-SCNC: 2.8 MMOL/L (ref 0.5–2)
LACTATE SERPL-SCNC: 3.5 MMOL/L (ref 0.5–2)
LEUKOCYTE ESTERASE UR QL STRIP: ABNORMAL
LIPASE SERPL-CCNC: 66 U/L (ref 73–393)
LYMPHOCYTES # BLD AUTO: 0.51 THOUSAND/UL (ref 0.6–4.47)
LYMPHOCYTES # BLD AUTO: 1.22 THOUSANDS/ΜL (ref 0.6–4.47)
LYMPHOCYTES # BLD AUTO: 4 % (ref 14–44)
LYMPHOCYTES NFR BLD AUTO: 6 % (ref 14–44)
MAGNESIUM SERPL-MCNC: 1.3 MG/DL (ref 1.6–2.6)
MCH RBC QN AUTO: 29.7 PG (ref 26.8–34.3)
MCH RBC QN AUTO: 30.3 PG (ref 26.8–34.3)
MCHC RBC AUTO-ENTMCNC: 33.3 G/DL (ref 31.4–37.4)
MCHC RBC AUTO-ENTMCNC: 34.3 G/DL (ref 31.4–37.4)
MCV RBC AUTO: 88 FL (ref 82–98)
MCV RBC AUTO: 89 FL (ref 82–98)
MONOCYTES # BLD AUTO: 0.38 THOUSAND/UL (ref 0–1.22)
MONOCYTES # BLD AUTO: 1.5 THOUSAND/ΜL (ref 0.17–1.22)
MONOCYTES NFR BLD AUTO: 7 % (ref 4–12)
MONOCYTES NFR BLD: 3 % (ref 4–12)
MUCOUS THREADS UR QL AUTO: ABNORMAL
NEUTROPHILS # BLD AUTO: 17.58 THOUSANDS/ΜL (ref 1.85–7.62)
NEUTROPHILS # BLD MANUAL: 11.87 THOUSAND/UL (ref 1.85–7.62)
NEUTS BAND NFR BLD MANUAL: 16 % (ref 0–8)
NEUTS SEG NFR BLD AUTO: 77 % (ref 43–75)
NEUTS SEG NFR BLD AUTO: 86 % (ref 43–75)
NITRITE UR QL STRIP: POSITIVE
NON-SQ EPI CELLS URNS QL MICRO: ABNORMAL /HPF
NRBC BLD AUTO-RTO: 0 /100 WBCS
P AXIS: 69 DEGREES
PH UR STRIP.AUTO: 6.5 [PH]
PHOSPHATE SERPL-MCNC: 1.1 MG/DL (ref 2.7–4.5)
PLATELET # BLD AUTO: 223 THOUSANDS/UL (ref 149–390)
PLATELET # BLD AUTO: 223 THOUSANDS/UL (ref 149–390)
PLATELET # BLD AUTO: 295 THOUSANDS/UL (ref 149–390)
PLATELET BLD QL SMEAR: ADEQUATE
PMV BLD AUTO: 8.8 FL (ref 8.9–12.7)
PMV BLD AUTO: 8.8 FL (ref 8.9–12.7)
PMV BLD AUTO: 9 FL (ref 8.9–12.7)
POTASSIUM SERPL-SCNC: 3.4 MMOL/L (ref 3.5–5.3)
POTASSIUM SERPL-SCNC: 4.1 MMOL/L (ref 3.5–5.3)
PR INTERVAL: 182 MS
PROCALCITONIN SERPL-MCNC: 0.34 NG/ML
PROT SERPL-MCNC: 8.6 G/DL (ref 6.4–8.4)
PROT UR STRIP-MCNC: ABNORMAL MG/DL
PROTHROMBIN TIME: 13.4 SECONDS (ref 11.6–14.5)
QRS AXIS: 117 DEGREES
QRSD INTERVAL: 84 MS
QT INTERVAL: 362 MS
QTC INTERVAL: 425 MS
RBC # BLD AUTO: 3.87 MILLION/UL (ref 3.81–5.12)
RBC # BLD AUTO: 4.89 MILLION/UL (ref 3.81–5.12)
RBC #/AREA URNS AUTO: ABNORMAL /HPF
RBC MORPH BLD: NORMAL
SODIUM SERPL-SCNC: 132 MMOL/L (ref 135–147)
SODIUM SERPL-SCNC: 137 MMOL/L (ref 135–147)
SP GR UR STRIP.AUTO: 1.01 (ref 1–1.03)
T WAVE AXIS: 100 DEGREES
URINE COMMENT: ABNORMAL
URINE COMMENT: ABNORMAL
UROBILINOGEN UR QL STRIP.AUTO: 0.2 E.U./DL
VENTRICULAR RATE: 83 BPM
WBC # BLD AUTO: 12.76 THOUSAND/UL (ref 4.31–10.16)
WBC # BLD AUTO: 20.48 THOUSAND/UL (ref 4.31–10.16)
WBC #/AREA URNS AUTO: ABNORMAL /HPF

## 2022-07-27 PROCEDURE — 87040 BLOOD CULTURE FOR BACTERIA: CPT | Performed by: EMERGENCY MEDICINE

## 2022-07-27 PROCEDURE — 87154 CUL TYP ID BLD PTHGN 6+ TRGT: CPT | Performed by: EMERGENCY MEDICINE

## 2022-07-27 PROCEDURE — 99152 MOD SED SAME PHYS/QHP 5/>YRS: CPT | Performed by: RADIOLOGY

## 2022-07-27 PROCEDURE — 93005 ELECTROCARDIOGRAM TRACING: CPT

## 2022-07-27 PROCEDURE — 85025 COMPLETE CBC W/AUTO DIFF WBC: CPT | Performed by: EMERGENCY MEDICINE

## 2022-07-27 PROCEDURE — 87186 SC STD MICRODIL/AGAR DIL: CPT | Performed by: INTERNAL MEDICINE

## 2022-07-27 PROCEDURE — 99291 CRITICAL CARE FIRST HOUR: CPT | Performed by: EMERGENCY MEDICINE

## 2022-07-27 PROCEDURE — 96375 TX/PRO/DX INJ NEW DRUG ADDON: CPT

## 2022-07-27 PROCEDURE — 85610 PROTHROMBIN TIME: CPT | Performed by: EMERGENCY MEDICINE

## 2022-07-27 PROCEDURE — 85049 AUTOMATED PLATELET COUNT: CPT

## 2022-07-27 PROCEDURE — 87077 CULTURE AEROBIC IDENTIFY: CPT | Performed by: INTERNAL MEDICINE

## 2022-07-27 PROCEDURE — G1004 CDSM NDSC: HCPCS

## 2022-07-27 PROCEDURE — 87086 URINE CULTURE/COLONY COUNT: CPT | Performed by: INTERNAL MEDICINE

## 2022-07-27 PROCEDURE — 84145 PROCALCITONIN (PCT): CPT | Performed by: EMERGENCY MEDICINE

## 2022-07-27 PROCEDURE — 0T143JD BYPASS LEFT KIDNEY PELVIS TO CUTANEOUS WITH SYNTHETIC SUBSTITUTE, PERCUTANEOUS APPROACH: ICD-10-PCS | Performed by: RADIOLOGY

## 2022-07-27 PROCEDURE — 50432 PLMT NEPHROSTOMY CATHETER: CPT | Performed by: RADIOLOGY

## 2022-07-27 PROCEDURE — 83735 ASSAY OF MAGNESIUM: CPT

## 2022-07-27 PROCEDURE — 85730 THROMBOPLASTIN TIME PARTIAL: CPT | Performed by: EMERGENCY MEDICINE

## 2022-07-27 PROCEDURE — 83605 ASSAY OF LACTIC ACID: CPT

## 2022-07-27 PROCEDURE — C1729 CATH, DRAINAGE: HCPCS

## 2022-07-27 PROCEDURE — C1894 INTRO/SHEATH, NON-LASER: HCPCS

## 2022-07-27 PROCEDURE — 82948 REAGENT STRIP/BLOOD GLUCOSE: CPT

## 2022-07-27 PROCEDURE — 80053 COMPREHEN METABOLIC PANEL: CPT | Performed by: EMERGENCY MEDICINE

## 2022-07-27 PROCEDURE — 87077 CULTURE AEROBIC IDENTIFY: CPT | Performed by: EMERGENCY MEDICINE

## 2022-07-27 PROCEDURE — 96365 THER/PROPH/DIAG IV INF INIT: CPT

## 2022-07-27 PROCEDURE — 36415 COLL VENOUS BLD VENIPUNCTURE: CPT

## 2022-07-27 PROCEDURE — 80048 BASIC METABOLIC PNL TOTAL CA: CPT

## 2022-07-27 PROCEDURE — 83690 ASSAY OF LIPASE: CPT | Performed by: EMERGENCY MEDICINE

## 2022-07-27 PROCEDURE — 84100 ASSAY OF PHOSPHORUS: CPT

## 2022-07-27 PROCEDURE — 85027 COMPLETE CBC AUTOMATED: CPT

## 2022-07-27 PROCEDURE — 96376 TX/PRO/DX INJ SAME DRUG ADON: CPT

## 2022-07-27 PROCEDURE — NC001 PR NO CHARGE: Performed by: RADIOLOGY

## 2022-07-27 PROCEDURE — 87086 URINE CULTURE/COLONY COUNT: CPT | Performed by: EMERGENCY MEDICINE

## 2022-07-27 PROCEDURE — 87186 SC STD MICRODIL/AGAR DIL: CPT | Performed by: EMERGENCY MEDICINE

## 2022-07-27 PROCEDURE — 81001 URINALYSIS AUTO W/SCOPE: CPT | Performed by: EMERGENCY MEDICINE

## 2022-07-27 PROCEDURE — 50432 PLMT NEPHROSTOMY CATHETER: CPT

## 2022-07-27 PROCEDURE — 74177 CT ABD & PELVIS W/CONTRAST: CPT

## 2022-07-27 PROCEDURE — 85007 BL SMEAR W/DIFF WBC COUNT: CPT

## 2022-07-27 PROCEDURE — 99285 EMERGENCY DEPT VISIT HI MDM: CPT

## 2022-07-27 PROCEDURE — 93010 ELECTROCARDIOGRAM REPORT: CPT | Performed by: INTERNAL MEDICINE

## 2022-07-27 PROCEDURE — 83605 ASSAY OF LACTIC ACID: CPT | Performed by: EMERGENCY MEDICINE

## 2022-07-27 PROCEDURE — 96361 HYDRATE IV INFUSION ADD-ON: CPT

## 2022-07-27 RX ORDER — ATORVASTATIN CALCIUM 40 MG/1
80 TABLET, FILM COATED ORAL DAILY
Status: DISCONTINUED | OUTPATIENT
Start: 2022-07-28 | End: 2022-07-31 | Stop reason: HOSPADM

## 2022-07-27 RX ORDER — CEFTRIAXONE 1 G/50ML
1000 INJECTION, SOLUTION INTRAVENOUS ONCE
Status: COMPLETED | OUTPATIENT
Start: 2022-07-27 | End: 2022-07-27

## 2022-07-27 RX ORDER — ALBUMIN, HUMAN INJ 5% 5 %
25 SOLUTION INTRAVENOUS ONCE
Status: COMPLETED | OUTPATIENT
Start: 2022-07-28 | End: 2022-07-28

## 2022-07-27 RX ORDER — FENTANYL CITRATE 50 UG/ML
INJECTION, SOLUTION INTRAMUSCULAR; INTRAVENOUS CODE/TRAUMA/SEDATION MEDICATION
Status: COMPLETED | OUTPATIENT
Start: 2022-07-27 | End: 2022-07-27

## 2022-07-27 RX ORDER — HEPARIN SODIUM 5000 [USP'U]/ML
5000 INJECTION, SOLUTION INTRAVENOUS; SUBCUTANEOUS EVERY 8 HOURS SCHEDULED
Status: DISCONTINUED | OUTPATIENT
Start: 2022-07-27 | End: 2022-07-27

## 2022-07-27 RX ORDER — ACETAMINOPHEN 325 MG/1
650 TABLET ORAL EVERY 6 HOURS PRN
Status: DISCONTINUED | OUTPATIENT
Start: 2022-07-27 | End: 2022-07-31 | Stop reason: HOSPADM

## 2022-07-27 RX ORDER — ASPIRIN 81 MG/1
81 TABLET ORAL DAILY
Status: DISCONTINUED | OUTPATIENT
Start: 2022-07-28 | End: 2022-07-31 | Stop reason: HOSPADM

## 2022-07-27 RX ORDER — ONDANSETRON 2 MG/ML
INJECTION INTRAMUSCULAR; INTRAVENOUS CODE/TRAUMA/SEDATION MEDICATION
Status: COMPLETED | OUTPATIENT
Start: 2022-07-27 | End: 2022-07-27

## 2022-07-27 RX ORDER — ONDANSETRON 2 MG/ML
4 INJECTION INTRAMUSCULAR; INTRAVENOUS ONCE
Status: COMPLETED | OUTPATIENT
Start: 2022-07-27 | End: 2022-07-27

## 2022-07-27 RX ORDER — INSULIN LISPRO 100 [IU]/ML
1-5 INJECTION, SOLUTION INTRAVENOUS; SUBCUTANEOUS
Status: DISCONTINUED | OUTPATIENT
Start: 2022-07-27 | End: 2022-07-31 | Stop reason: HOSPADM

## 2022-07-27 RX ORDER — MAGNESIUM SULFATE HEPTAHYDRATE 40 MG/ML
2 INJECTION, SOLUTION INTRAVENOUS ONCE
Status: COMPLETED | OUTPATIENT
Start: 2022-07-27 | End: 2022-07-28

## 2022-07-27 RX ORDER — CEFTRIAXONE 1 G/50ML
1000 INJECTION, SOLUTION INTRAVENOUS EVERY 24 HOURS
Status: DISCONTINUED | OUTPATIENT
Start: 2022-07-28 | End: 2022-07-28

## 2022-07-27 RX ORDER — CLOPIDOGREL BISULFATE 75 MG/1
75 TABLET ORAL DAILY
Status: DISCONTINUED | OUTPATIENT
Start: 2022-07-28 | End: 2022-07-31 | Stop reason: HOSPADM

## 2022-07-27 RX ORDER — INSULIN LISPRO 100 [IU]/ML
1-6 INJECTION, SOLUTION INTRAVENOUS; SUBCUTANEOUS
Status: DISCONTINUED | OUTPATIENT
Start: 2022-07-28 | End: 2022-07-31 | Stop reason: HOSPADM

## 2022-07-27 RX ORDER — ESCITALOPRAM OXALATE 10 MG/1
10 TABLET ORAL 2 TIMES DAILY
Status: DISCONTINUED | OUTPATIENT
Start: 2022-07-28 | End: 2022-07-31 | Stop reason: HOSPADM

## 2022-07-27 RX ORDER — HEPARIN SODIUM 5000 [USP'U]/ML
5000 INJECTION, SOLUTION INTRAVENOUS; SUBCUTANEOUS EVERY 8 HOURS SCHEDULED
Status: DISCONTINUED | OUTPATIENT
Start: 2022-07-27 | End: 2022-07-31 | Stop reason: HOSPADM

## 2022-07-27 RX ORDER — SODIUM CHLORIDE, SODIUM GLUCONATE, SODIUM ACETATE, POTASSIUM CHLORIDE, MAGNESIUM CHLORIDE, SODIUM PHOSPHATE, DIBASIC, AND POTASSIUM PHOSPHATE .53; .5; .37; .037; .03; .012; .00082 G/100ML; G/100ML; G/100ML; G/100ML; G/100ML; G/100ML; G/100ML
1000 INJECTION, SOLUTION INTRAVENOUS ONCE
Status: COMPLETED | OUTPATIENT
Start: 2022-07-27 | End: 2022-07-27

## 2022-07-27 RX ORDER — LANOLIN ALCOHOL/MO/W.PET/CERES
6 CREAM (GRAM) TOPICAL
Status: DISCONTINUED | OUTPATIENT
Start: 2022-07-27 | End: 2022-07-31 | Stop reason: HOSPADM

## 2022-07-27 RX ORDER — MIDAZOLAM HYDROCHLORIDE 2 MG/2ML
INJECTION, SOLUTION INTRAMUSCULAR; INTRAVENOUS CODE/TRAUMA/SEDATION MEDICATION
Status: COMPLETED | OUTPATIENT
Start: 2022-07-27 | End: 2022-07-27

## 2022-07-27 RX ORDER — SODIUM CHLORIDE, SODIUM GLUCONATE, SODIUM ACETATE, POTASSIUM CHLORIDE, MAGNESIUM CHLORIDE, SODIUM PHOSPHATE, DIBASIC, AND POTASSIUM PHOSPHATE .53; .5; .37; .037; .03; .012; .00082 G/100ML; G/100ML; G/100ML; G/100ML; G/100ML; G/100ML; G/100ML
500 INJECTION, SOLUTION INTRAVENOUS ONCE
Status: COMPLETED | OUTPATIENT
Start: 2022-07-28 | End: 2022-07-28

## 2022-07-27 RX ORDER — SODIUM CHLORIDE, SODIUM GLUCONATE, SODIUM ACETATE, POTASSIUM CHLORIDE, MAGNESIUM CHLORIDE, SODIUM PHOSPHATE, DIBASIC, AND POTASSIUM PHOSPHATE .53; .5; .37; .037; .03; .012; .00082 G/100ML; G/100ML; G/100ML; G/100ML; G/100ML; G/100ML; G/100ML
100 INJECTION, SOLUTION INTRAVENOUS CONTINUOUS
Status: DISCONTINUED | OUTPATIENT
Start: 2022-07-27 | End: 2022-07-29

## 2022-07-27 RX ORDER — AMOXICILLIN 250 MG
2 CAPSULE ORAL 2 TIMES DAILY
Status: DISCONTINUED | OUTPATIENT
Start: 2022-07-28 | End: 2022-07-31 | Stop reason: HOSPADM

## 2022-07-27 RX ADMIN — IOHEXOL 35 ML: 350 INJECTION, SOLUTION INTRAVENOUS at 21:01

## 2022-07-27 RX ADMIN — ACETAMINOPHEN 650 MG: 325 TABLET ORAL at 21:20

## 2022-07-27 RX ADMIN — ONDANSETRON 4 MG: 2 INJECTION INTRAMUSCULAR; INTRAVENOUS at 19:56

## 2022-07-27 RX ADMIN — MAGNESIUM SULFATE HEPTAHYDRATE 2 G: 40 INJECTION, SOLUTION INTRAVENOUS at 23:39

## 2022-07-27 RX ADMIN — CEFTRIAXONE 1000 MG: 1 INJECTION, SOLUTION INTRAVENOUS at 17:31

## 2022-07-27 RX ADMIN — IOHEXOL 62 ML: 350 INJECTION, SOLUTION INTRAVENOUS at 16:53

## 2022-07-27 RX ADMIN — SODIUM CHLORIDE 1000 ML: 0.9 INJECTION, SOLUTION INTRAVENOUS at 18:28

## 2022-07-27 RX ADMIN — INSULIN LISPRO 1 UNITS: 100 INJECTION, SOLUTION INTRAVENOUS; SUBCUTANEOUS at 22:31

## 2022-07-27 RX ADMIN — SODIUM CHLORIDE, SODIUM GLUCONATE, SODIUM ACETATE, POTASSIUM CHLORIDE, MAGNESIUM CHLORIDE, SODIUM PHOSPHATE, DIBASIC, AND POTASSIUM PHOSPHATE 1000 ML: .53; .5; .37; .037; .03; .012; .00082 INJECTION, SOLUTION INTRAVENOUS at 21:19

## 2022-07-27 RX ADMIN — SODIUM CHLORIDE, SODIUM GLUCONATE, SODIUM ACETATE, POTASSIUM CHLORIDE, MAGNESIUM CHLORIDE, SODIUM PHOSPHATE, DIBASIC, AND POTASSIUM PHOSPHATE 125 ML/HR: .53; .5; .37; .037; .03; .012; .00082 INJECTION, SOLUTION INTRAVENOUS at 23:37

## 2022-07-27 RX ADMIN — MIDAZOLAM 2 MG: 1 INJECTION INTRAMUSCULAR; INTRAVENOUS at 20:04

## 2022-07-27 RX ADMIN — FENTANYL CITRATE 50 MCG: 50 INJECTION, SOLUTION INTRAMUSCULAR; INTRAVENOUS at 20:05

## 2022-07-27 RX ADMIN — HEPARIN SODIUM 5000 UNITS: 5000 INJECTION INTRAVENOUS; SUBCUTANEOUS at 21:57

## 2022-07-27 RX ADMIN — ONDANSETRON 4 MG: 2 INJECTION INTRAMUSCULAR; INTRAVENOUS at 15:52

## 2022-07-27 RX ADMIN — SODIUM CHLORIDE, SODIUM GLUCONATE, SODIUM ACETATE, POTASSIUM CHLORIDE, MAGNESIUM CHLORIDE, SODIUM PHOSPHATE, DIBASIC, AND POTASSIUM PHOSPHATE 1000 ML: .53; .5; .37; .037; .03; .012; .00082 INJECTION, SOLUTION INTRAVENOUS at 22:27

## 2022-07-27 RX ADMIN — Medication 6 MG: at 21:56

## 2022-07-27 RX ADMIN — SODIUM CHLORIDE 1000 ML: 0.9 INJECTION, SOLUTION INTRAVENOUS at 15:52

## 2022-07-27 NOTE — ED NOTES
This nurse attempted another IV access site, and was unable  When this nurse went to try again, patient requested that I not  Educated patient on the importance of keeping right arm straight so fluids infuse, patient verbalized understanding        Imani Sands RN  07/27/22 0420

## 2022-07-27 NOTE — ED PROVIDER NOTES
History  Chief Complaint   Patient presents with    Vomiting     Vomiting and diarrhea starting last night  Unable to keep any food/water down  Patient is a 64year old female with a past medical history significant for HTN, HLD, DM, CAD s/p stents and CABG on ASA and plavix who presents with N/V/D + abdominal pain  Patient reports that yesterday evening she developed nausea, followed by numerous episodes of non-bloody, non-bilious emesis as well as numerous episodes of non-bloody, non-mucoid diarrhea  Describes abdominal pain that is in the LLQ, intermittent, feels improved after having a BM, moderate in intensity  States that any time she tries to drink or eat anything she either vomits or has diarrhea  Feels chills  No recent travel  No new restaurants or take out food  No known sick contacts  Prior to Admission Medications   Prescriptions Last Dose Informant Patient Reported? Taking?    MELATONIN PO 7/26/2022 at Unknown time  Yes Yes   Sig: Take 10 mg by mouth daily at bedtime   aspirin (ECOTRIN LOW STRENGTH) 81 mg EC tablet 7/26/2022 at Unknown time  Yes Yes   Sig: Take 81 mg by mouth daily   atorvastatin (LIPITOR) 80 mg tablet 7/26/2022 at Unknown time  No Yes   Sig: take 1 tablet by mouth daily   carvedilol (COREG) 6 25 mg tablet 7/26/2022 at Unknown time  No Yes   Sig: Take 1 tablet (6 25 mg total) by mouth 2 (two) times a day with meals   clopidogrel (PLAVIX) 75 mg tablet 7/26/2022 at Unknown time  No Yes   Sig: Take 1 tablet (75 mg total) by mouth daily   escitalopram (LEXAPRO) 10 mg tablet 7/26/2022 at Unknown time  No Yes   Sig: Take 1 tablet (10 mg total) by mouth 2 (two) times a day   glipiZIDE (GLUCOTROL XL) 10 mg 24 hr tablet 7/26/2022 at Unknown time  No Yes   Sig: take 1 tablet by mouth daily   hydrochlorothiazide (HYDRODIURIL) 12 5 mg tablet 7/26/2022 at Unknown time  No Yes   Sig: Take 1 tablet (12 5 mg total) by mouth daily   losartan (COZAAR) 50 mg tablet 7/26/2022 at Unknown time  No Yes   Sig: take 1 tablet by mouth daily   zolpidem (AMBIEN) 10 mg tablet 2022 at Unknown time  No Yes   Sig: TAKE 1 TABLET BY MOUTH DAILY AT BEDTIME AS NEEDED FOR SLEEP  Facility-Administered Medications: None       Past Medical History:   Diagnosis Date    Circulation problem     Coronary artery disease     Depression     Diabetes ([facility] [place] [address] [facility]     Dyspnea on exertion 2019    Glaucoma     Hearing problem     [facility]     Heart failure [facility]     Heart valve problem     High blood pressure     High cholesterol     Obesity[facility] morbid ([facility] ) 2021    Open injury of heart     Sleep apnea        Past Surgical History:   Procedure Laterality Date    CORONARY ARTERY BYPASS GRAFT      CORONARY STENT PLACEMENT  2010    CORONARY STENT PLACEMENT  2015    IR NEPHROSTOMY TUBE PLACEMENT  2022    VAGINAL DELIVERY      VAGINAL DELIVERY         Family History   Problem Relation Age of Onset    Hypertension Father     Coronary artery disease Father     Sudden death Father         cardiac 2014     I have reviewed and agree with the history as documented  E-Cigarette/Vaping    E-Cigarette Use Never User      E-Cigarette/Vaping Substances     Social History     Tobacco Use    Smoking status: Former Smoker     Types: Cigarettes     Quit date:      Years since quittin 5    Smokeless tobacco: Never Used   Vaping Use    Vaping Use: Never used   Substance Use Topics    Alcohol use: Yes     Comment: social    Drug use: Yes     Types: Marijuana       Review of Systems   Constitutional: Positive for chills  Negative for fever  HENT: Negative for congestion and rhinorrhea  Eyes: Negative for photophobia and visual disturbance  Respiratory: Negative for cough and shortness of breath  Cardiovascular: Negative for chest pain and palpitations  Gastrointestinal: Positive for abdominal pain, diarrhea, nausea and vomiting   Negative for blood in stool and constipation  Genitourinary: Negative for dysuria, flank pain and hematuria  Musculoskeletal: Negative for back pain and neck pain  Skin: Negative for color change and pallor  Neurological: Negative for dizziness, weakness, light-headedness, numbness and headaches  Physical Exam  Physical Exam  Vitals and nursing note reviewed  Constitutional:       General: She is not in acute distress  Appearance: Normal appearance  She is not ill-appearing, toxic-appearing or diaphoretic  HENT:      Head: Normocephalic and atraumatic  Mouth/Throat:      Mouth: Mucous membranes are moist    Eyes:      Conjunctiva/sclera: Conjunctivae normal       Pupils: Pupils are equal, round, and reactive to light  Cardiovascular:      Rate and Rhythm: Normal rate and regular rhythm  Pulses: Normal pulses  Heart sounds: Normal heart sounds  No murmur heard  Pulmonary:      Effort: Pulmonary effort is normal  No respiratory distress  Breath sounds: Normal breath sounds  No stridor  No wheezing, rhonchi or rales  Chest:      Chest wall: No tenderness  Abdominal:      General: Bowel sounds are normal  There is no distension  Palpations: Abdomen is soft  There is no mass  Tenderness: There is abdominal tenderness in the left lower quadrant  There is no right CVA tenderness, left CVA tenderness, guarding or rebound  Negative signs include Roberto's sign, Rovsing's sign, McBurney's sign and psoas sign  Hernia: No hernia is present  Musculoskeletal:      Cervical back: Neck supple  Right lower leg: No edema  Left lower leg: No edema  Skin:     General: Skin is warm and dry  Neurological:      General: No focal deficit present  Mental Status: She is alert and oriented to person, place, and time  Mental status is at baseline     Psychiatric:         Mood and Affect: Mood normal          Behavior: Behavior normal          Vital Signs  ED Triage Vitals [07/27/22 1453]   Temperature Pulse Respirations Blood Pressure SpO2   98 5 °F (36 9 °C) 97 16 136/81 95 %      Temp Source Heart Rate Source Patient Position - Orthostatic VS BP Location FiO2 (%)   Temporal Monitor Sitting Right arm --      Pain Score       No Pain           Vitals:    07/27/22 2021 07/27/22 2022 07/27/22 2025 07/27/22 2027   BP:  119/72 121/69 119/72   Pulse: (!) 116 (!) 112 (!) 116 (!) 116   Patient Position - Orthostatic VS:             Visual Acuity      ED Medications  Medications   senna-docusate sodium (SENOKOT S) 8 6-50 mg per tablet 2 tablet (has no administration in time range)   acetaminophen (TYLENOL) tablet 650 mg (650 mg Oral Given 7/27/22 2120)   clopidogrel (PLAVIX) tablet 75 mg (has no administration in time range)   aspirin (ECOTRIN LOW STRENGTH) EC tablet 81 mg (has no administration in time range)   escitalopram (LEXAPRO) tablet 10 mg (has no administration in time range)   melatonin tablet 6 mg (has no administration in time range)   atorvastatin (LIPITOR) tablet 80 mg (has no administration in time range)   insulin lispro (HumaLOG) 100 units/mL subcutaneous injection 1-6 Units (has no administration in time range)   insulin lispro (HumaLOG) 100 units/mL subcutaneous injection 1-5 Units (has no administration in time range)   heparin (porcine) subcutaneous injection 5,000 Units (has no administration in time range)   sodium chloride 0 9 % bolus 1,000 mL (0 mL Intravenous Stopped 7/27/22 1828)   ondansetron (ZOFRAN) injection 4 mg (4 mg Intravenous Given 7/27/22 1552)   iohexol (OMNIPAQUE) 350 MG/ML injection (SINGLE-DOSE) 100 mL (62 mL Intravenous Given 7/27/22 1653)   cefTRIAXone (ROCEPHIN) IVPB (premix in dextrose) 1,000 mg 50 mL (0 mg Intravenous Stopped 7/27/22 1801)   sodium chloride 0 9 % bolus 1,000 mL (0 mL Intravenous Stopped 7/27/22 1907)   ondansetron (ZOFRAN) injection (4 mg Intravenous Given 7/27/22 1956)   midazolam (VERSED) injection (2 mg Intravenous Given 7/27/22 2004)   fentanyl citrate (PF) 100 MCG/2ML (50 mcg Intravenous Given 7/27/22 2005)   iohexol (OMNIPAQUE) 350 MG/ML injection (SINGLE-DOSE) 40 mL (35 mL Other Given 7/27/22 2101)   multi-electrolyte (PLASMALYTE-A/ISOLYTE-S PH 7 4) IV solution 1,000 mL (1,000 mL Intravenous New Bag 7/27/22 2119)       Diagnostic Studies  Results Reviewed     Procedure Component Value Units Date/Time    Urine culture [[id number]] Collected: 07/27/22 2053    Lab Status: In process Specimen: Urine, Other Updated: 07/27/22 2108    Lactic acid 2 Hours [[id number]]  (Abnormal) Collected: 07/27/22 1922    Lab Status: Final result Specimen: Blood from Arm, Right Updated: 07/27/22 2000     LACTIC ACID 3 5 mmol/L     Narrative:      Result may be elevated if tourniquet was used during collection  Procalcitonin [[id number]]  (Abnormal) Collected: 07/27/22 1458    Lab Status: Final result Specimen: Blood from Arm, Left Updated: 07/27/22 1942     Procalcitonin 0 34 ng/ml     UA w Reflex to Microscopic w Reflex to Culture [[id number]]  (Abnormal) Collected: 07/27/22 1605    Lab Status: Final result Specimen: Urine, Clean Catch Updated: 07/27/22 1802     Color, UA Yellow     Clarity, UA Cloudy     Specific Gravity, UA 1 015     pH, UA 6 5     Leukocytes, UA Large     Nitrite, UA Positive     Protein,  (2+) mg/dl      Glucose,  (1/4%) mg/dl      Ketones, UA Negative mg/dl      Urobilinogen, UA 0 2 E U /dl      Bilirubin, UA Negative     Occult Blood, UA Large     URINE COMMENT Several stones present  Called [facility] ED [facility]    Urine Microscopic [[id number]]  (Abnormal) Collected: 07/27/22 1605    Lab Status: Final result Specimen: Urine, Clean Catch Updated: 07/27/22 1802     RBC, UA 2-4 /hpf      WBC, UA Innumerable /hpf      Epithelial Cells Innumerable /hpf      Bacteria, UA Innumerable /hpf      MUCUS THREADS None Seen     URINE COMMENT Several stones present   Called to ED [facility]    Urine culture [[id number]] Collected: 07/27/22 1605    Lab Status: In process Specimen: Urine, Clean Catch Updated: 07/27/22 1802    Lactic acid [[id number]]  (Abnormal) Collected: 07/27/22 1548    Lab Status: Final result Specimen: Blood from Arm, Right Updated: 07/27/22 1718     LACTIC ACID 2 7 mmol/L     Narrative:      Result may be elevated if tourniquet was used during collection  Therachel Card [[id number]]  (Normal) Collected: 07/27/22 1548    Lab Status: Final result Specimen: Blood from Arm, Right Updated: 07/27/22 1616     Protime 13 4 seconds      INR 0 95    APTT [[id number]]  (Normal) Collected: 07/27/22 1548    Lab Status: Final result Specimen: Blood from Arm, Right Updated: 07/27/22 1616     PTT 31 seconds     Blood culture #2 [[id number]] Collected: 07/27/22 1548    Lab Status: In process Specimen: Blood from Arm, Right Updated: 07/27/22 1557    Blood culture #1 [[id number]] Collected: 07/27/22 1548    Lab Status:  In process Specimen: Blood from Arm, Right Updated: 07/27/22 1557    Comprehensive metabolic panel [[id number]]  (Abnormal) Collected: 07/27/22 1458    Lab Status: Final result Specimen: Blood from Arm, Left Updated: 07/27/22 1544     Sodium 132 mmol/L      Potassium 4 1 mmol/L      Chloride 96 mmol/L      CO2 27 mmol/L      ANION GAP 9 mmol/L      BUN 11 mg/dL      Creatinine 1 14 mg/dL      Glucose 367 mg/dL      Calcium 9 2 mg/dL      AST 15 U/L      ALT 31 U/L      Alkaline Phosphatase 108 U/L      Total Protein 8 6 g/dL      Albumin 3 6 g/dL      Total Bilirubin 0 80 mg/dL      eGFR 53 ml/min/1 73sq m     Narrative:      [place] guidelines for Chronic Kidney Disease (CKD):     Stage 1 with normal or high GFR (GFR > 90 mL/min/1 73 square meters)    Stage 2 Mild CKD (GFR = 60-89 mL/min/1 73 square meters)    Stage 3A Moderate CKD (GFR = 45-59 mL/min/1 73 square meters)    Stage 3B Moderate CKD (GFR = 30-44 mL/min/1 73 square meters)    Stage 4 Severe CKD (GFR = 15-29 mL/min/1 73 square meters)   Stage 5 End Stage CKD (GFR <15 mL/min/1 73 square meters)  Note: GFR calculation is accurate only with a steady state creatinine    Lipase [[id number]]  (Abnormal) Collected: 07/27/22 1458    Lab Status: Final result Specimen: Blood from Arm, Left Updated: 07/27/22 1544     Lipase 66 u/L     CBC and differential [[id number]]  (Abnormal) Collected: 07/27/22 1458    Lab Status: Final result Specimen: Blood from Arm, Left Updated: 07/27/22 1504     WBC 20 48 Thousand/uL      RBC 4 89 Million/uL      Hemoglobin 14 8 g/dL      Hematocrit 43 2 %      MCV 88 fL      MCH 30 3 pg      MCHC 34 3 g/dL      RDW 12 3 %      MPV 9 0 fL      Platelets 555 Thousands/uL      nRBC 0 /100 WBCs      Neutrophils Relative 86 %      Immat GRANS % 1 %      Lymphocytes Relative 6 %      Monocytes Relative 7 %      Eosinophils Relative 0 %      Basophils Relative 0 %      Neutrophils Absolute 17 58 Thousands/µL      Immature Grans Absolute 0 12 Thousand/uL      Lymphocytes Absolute 1 22 Thousands/µL      Monocytes Absolute 1 50 Thousand/µL      Eosinophils Absolute 0 01 Thousand/µL      Basophils Absolute 0 05 Thousands/µL                  IR nephrostomy tube placement   Final Result by Katy Greenwood MD (07/27 2104)      1  Left PCN placement using 8 Tristanian catheter  2  There was severe left hydronephrosis with patent left ureter  Plan:       - PCN to bag gravity drainage    - Follow-up in 3 months for routine catheter exchange if patient continues to have catheter at that time  Workstation performed: [id number]         CT abdomen pelvis with contrast   Final Result by Nathalie Cordero MD (07/27 1706)      Moderate left hydronephrosis with extensive gas throughout the left renal pelvis and throughout the left ureter in keeping with an emphysematous UTI  No obstructive renal ureteral calculus  Hydronephrosis is likely on the basis of obstructive infectious debris        Nonobstructive 13 mm and 14 mm renal pelvic calculi  The study was marked in [facility] for immediate notification              Workstation performed: [id number]         IR nephrostomy tube check/change/reposition/reinsertion/upsize    (Results Pending)              Procedures  ECG 12 Lead Documentation Only    Date/Time: 7/27/2022 4:02 PM  Performed by: Melissa Rowan DO  Authorized by: Melissa Rowan DO     Indications / Diagnosis:  Abdominal pain  ECG reviewed by me, the ED Provider: yes    Patient location:  ED  Previous ECG:     Previous ECG:  Compared to current    Comparison ECG info:  1/21/2019    Similarity:  No change  Interpretation:     Interpretation: non-specific    Rate:     ECG rate:  83    ECG rate assessment: normal    Rhythm:     Rhythm: sinus rhythm    Ectopy:     Ectopy: none    QRS:     QRS axis:  Normal    QRS intervals:  Normal  Conduction:     Conduction: abnormal      Abnormal conduction: LPFB    ST segments:     ST segments:  Non-specific  T waves:     T waves: non-specific    Comments:      qtc 425    CriticalCare Time  Performed by: Melissa Rowan DO  Authorized by: Melissa Rowan DO     Critical care provider statement:     Critical care time (minutes):  40    Critical care start time:  7/27/2022 5:20 PM    Critical care end time:  7/27/2022 6:32 PM    Critical care time was exclusive of:  Separately billable procedures and treating other patients and teaching time    Critical care was necessary to treat or prevent imminent or life-threatening deterioration of the following conditions:  Sepsis    Critical care was time spent personally by me on the following activities:  Blood draw for specimens, obtaining history from patient or surrogate, development of treatment plan with patient or surrogate, discussions with consultants, discussions with primary provider, evaluation of patient's response to treatment, examination of patient, review of old charts, re-evaluation of patient's condition, ordering and review of radiographic studies, ordering and review of laboratory studies and ordering and performing treatments and interventions    I assumed direction of critical care for this patient from another provider in my specialty: no        Conscious Sedation Assessment    Flowsheet Row Classification Score   ASA Scale Assessment 2-Mild to moderate systemic disease, medically well controlled, with no functional limitation filed at 07/27/2022 1944   Mallampati Classification Class II: soft palate, uvula, fauces visible - No Difficulty filed at 07/27/2022 1944             ED Course  ED Course as of 07/27/22 2129 Wed Jul 27, 2022   1720 LACTIC ACID(!!): 2 7   1721 Sepsis alert called  Rocephin and fluids ordered  1722 Moderate left hydronephrosis with extensive gas throughout the left renal pelvis and throughout the left ureter in keeping with an emphysematous UTI      No obstructive renal ureteral calculus  Hydronephrosis is likely on the basis of obstructive infectious debris      Nonobstructive 13 mm and 14 mm renal pelvic calculi    1722 Secondary to CT findings, will need to place etienne catheter   [facility]  1754 Per urology, recommend STAT transfer to [facility] for stent and will need SD or critical care bed  1758 Lab called and reports that there are stones in patient's urine specimen  4090 Per Ganga Fuentes- Dr Jen Whitfield got in contact with IR at [facility] and they are going to see if PCN can be done there to avoid transfer  If it cannot then patient will need to be emergently transferred to [facility] for [facility]N instead of stent  Would recommend ICU if needs to be transferred  She will keep me updated  1904 Per urology- ok for patient is staying there and IR is doing PCN   [facility]                            Initial Sepsis Screening     [address] Name 07/27/22 1730                Is the patient's history suggestive of a new or worsening infection?  Yes (Proceed)  -LG Suspected source of infection urinary tract infection  -LG        Are two or more of the following signs & symptoms of infection both present and new to the patient? Yes (Proceed)  -LG        Indicate SIRS criteria Tachycardia > 90 bpm;Leukocytosis (WBC > 20244 IJL)  -LG        If the answer is yes to both questions, suspicion of sepsis is present --        If severe sepsis is present AND tissue hypoperfusion perists in the hour after fluid resuscitation or lactate > 4, the patient meets criteria for SEPTIC SHOCK --        Are any of the following organ dysfunction criteria present within 6 hours of suspected infection and SIRS criteria that are NOT considered to be chronic conditions?  Yes  -LG        Organ dysfunction Lactate > 2 0 mmol/L  -LG        Date of presentation of severe sepsis --        Time of presentation of severe sepsis 1732  -LG        Tissue hypoperfusion persists in the hour after crystalloid fluid administration, evidenced, by either: --        Was hypotension present within one hour of the conclusion of crystalloid fluid administration? --        Date of presentation of septic shock --        Time of presentation of septic shock --              User Key  (r) = Recorded By, (t) = Taken By, (c) = Cosigned By    Initials Name Provider Type    LG Ludy Lambert DO Physician              Default Flowsheet Data (last 720 hours)     Sepsis Reassess     Row Name 07/27/22 2100                   Repeat Volume Status and Tissue Perfusion Assessment Performed    Repeat Volume Status and Tissue Perfusion Assessment Performed Yes  -SS                  Volume Status and Tissue Perfusion Post Fluid Resuscitation * Must Document All *    Vital Signs Reviewed (HR, RR, BP, T) Yes  -SS        Shock Index Reviewed --        Arterial Oxygen Saturation Reviewed (POx, SaO2 or SpO2) --        Cardio Regular rate and rhythm  -SS        Pulmonary Normal effort  -SS        Capillary Refill Brisk  -SS Peripheral Pulses Radial  -SS        Peripheral Pulse +2  -SS        Skin Warm  -SS        Urine output assessed Other (comment)  Garcia placed with about 150 cc UO and PCN with noteable urine as well  -SS                  *OR*   Intensive Monitoring- Must Document One of the Following Four *:    Vital Signs Reviewed --        * Central Venous Pressure (CVP or RAP) --        * Central Venous Oxygen (SVO2, ScvO2 or Oxygen saturation via central catheter) --        * Bedside Cardiovascular US in IVC diameter and % collapse --        * Passive Leg Raise OR Crystalloid Challenge --              User Key  (r) = Recorded By, (t) = Taken By, (c) = Cosigned By    Initials Name Provider Type     Anatoliy Lundberg Nurse Practitioner              SBIRT 22yo+    Flowsheet Row Most Recent Value   SBIRT (25 yo +)    In order to provide better care to our patients, we are screening all of our patients for alcohol and drug use  Would it be okay to ask you these screening questions? Yes Filed at: 07/27/2022 1557   Initial Alcohol Screen: US AUDIT-C     1  How often do you have a drink containing alcohol? 0 Filed at: 07/27/2022 1557   2  How many drinks containing alcohol do you have on a typical day you are drinking? 0 Filed at: 07/27/2022 1557   3a  Male UNDER 65: How often do you have five or more drinks on one occasion? 0 Filed at: 07/27/2022 1557   3b  FEMALE Any Age, or MALE 65+: How often do you have 4 or more drinks on one occassion? 0 Filed at: 07/27/2022 1557   Audit-C Score 0 Filed at: 07/27/2022 1557   DONNA: How many times in the past year have you    Used an illegal drug or used a prescription medication for non-medical reasons? Never Filed at: 07/27/2022 1557                    MDM  Number of Diagnoses or Management Options  Diagnosis management comments: Assessment and Plan:   Differential includes colitis v  Enteritis v  Diverticulitis  Will check labs, imaging  Treat symptomatically  Reassess  Disposition  Final diagnoses:   Severe sepsis ([facility] [address] )   UTI (urinary tract infection)   Hydroureter on left     Time reflects when diagnosis was documented in both MDM as applicable and the Disposition within this note     Time User Action Codes Description Comment    7/27/2022  5:21 PM Jose Colby Add [A41 9,  R65 20] Severe sepsis ([facility] )     7/27/2022  5:21 PM Jose Colby Add [N39 0] UTI (urinary tract infection)     7/27/2022  5:58 PM Jose Colby Add [N13 4] Hydroureter on left     7/27/2022  6:00 PM Delaney Summerfield Add [N12] Emphysematous pyelonephritis of left kidney     7/27/2022  6:00 PM Delaney Summerfield Add [N13 39] Other hydronephrosis       ED Disposition     ED Disposition   Admit    Condition   Stable    Date/Time   Wed Jul 27, 2022  7:59 PM    Comment   Chalo Carrera should be transferred out to [facility]             Follow-up Information    None         Current Discharge Medication List      CONTINUE these medications which have NOT CHANGED    Details   aspirin (ECOTRIN LOW STRENGTH) 81 mg EC tablet Take 81 mg by mouth daily      atorvastatin (LIPITOR) 80 mg tablet take 1 tablet by mouth daily  Qty: 90 tablet, Refills: 1    Associated Diagnoses: Coronary artery disease involving native coronary artery of native heart without angina pectoris      carvedilol (COREG) 6 25 mg tablet Take 1 tablet (6 25 mg total) by mouth 2 (two) times a day with meals  Qty: 180 tablet, Refills: 0    Associated Diagnoses: Essential hypertension      clopidogrel (PLAVIX) 75 mg tablet Take 1 tablet (75 mg total) by mouth daily  Qty: 30 tablet, Refills: 1    Associated Diagnoses: Coronary artery disease involving native coronary artery of native heart without angina pectoris      escitalopram (LEXAPRO) 10 mg tablet Take 1 tablet (10 mg total) by mouth 2 (two) times a day  Qty: 180 tablet, Refills: 1    Associated Diagnoses: Depression, unspecified depression type      glipiZIDE (GLUCOTROL XL) 10 mg 24 hr tablet take 1 tablet by mouth daily  Qty: 90 tablet, Refills: 1    Associated Diagnoses: Type 2 diabetes mellitus with hyperglycemia, without long-term current use of insulin (HCC)      hydrochlorothiazide (HYDRODIURIL) 12 5 mg tablet Take 1 tablet (12 5 mg total) by mouth daily  Qty: 90 tablet, Refills: 1    Associated Diagnoses: Essential hypertension      losartan (COZAAR) 50 mg tablet take 1 tablet by mouth daily  Qty: 90 tablet, Refills: 1    Associated Diagnoses: Essential hypertension      MELATONIN PO Take 10 mg by mouth daily at bedtime      zolpidem (AMBIEN) 10 mg tablet TAKE 1 TABLET BY MOUTH DAILY AT BEDTIME AS NEEDED FOR SLEEP  Qty: 30 tablet, Refills: 1    Comments: Not to exceed 4 additional fills before 10/08/2022  Associated Diagnoses: Insomnia, unspecified type             Outpatient Discharge Orders   IR nephrostomy tube check/change/reposition/reinsertion/upsize   Standing Status: Future Standing Exp   Date: 07/27/26       PDMP Review       Value Time User    PDMP Reviewed  Yes 6/21/2022 12:03 PM [address] Provider  Electronically Signed by           Marquis Jcarlos DO  07/27/22 2980

## 2022-07-27 NOTE — CONSULTS
e-Consult (IPC)  - Interventional Radiology  Earline Jurado 64 y o  female MRN: [id number]  Unit/Bed#: TR60W Encounter: [id number]          Interventional Radiology has been consulted to evaluate Roni Wilkins  07/27/22    Assessment/Recommendation:   59-year-old female with history of hypertension, hyperlipidemia, diabetes, coronary artery disease, CAD status post CABG on aspirin and Plavix presenting with abdominal pain  Patient is noted to be septic with significant left emphysematous pyelitis  - given the sepsis and significant emphysematous pyelitis, plan for left PCN placement tonight  - please keep patient NPO  - case d/w Dr Crow Reyes  Total time spent in review of data, discussion with requesting provider and rendering advice was 15 min  Thank you for allowing Interventional Radiology to participate in the care of Earline Jurado  Please don't hesitate to call or TigerText us with any questions       Héctor Rodriguez MD

## 2022-07-27 NOTE — QUICK NOTE
Contacted by Urology on-call APC  Patient at [facility] with sepsis and significant emphysematous pyelitis  Case discussed with IR, Dr Radha Moore, who feels PCN is appropriate option for maximum proximal drainage  Patient is on ASA/Plavix however emergent drainage is priority and he is comfortable with nephrostomy attempt  Decision for transfer will depend on ability to perform IR procedure on campus, otherwise will require transfer to [place] location  I would strongly recommend etienne placement, broad spectrum antibiotics and consideration of ICU support roverto-procedurally  Urology formal consult will follow

## 2022-07-27 NOTE — SEPSIS NOTE
Sepsis Note   Mario Lowe 64 y o  female MRN: [id number]  Unit/Bed#: TR13A Encounter: [id number]       qSOFA     [address] Name 07/27/22 1557 07/27/22 1453             Altered mental status GCS < 15 0 --       Respiratory Rate > / =22 -- 0       Systolic BP < / =509 -- 0       Q Sofa Score 0 0                  Initial Sepsis Screening     Row Name 07/27/22 1730                Is the patient's history suggestive of a new or worsening infection? Yes (Proceed)  -LG        Suspected source of infection urinary tract infection  -LG        Are two or more of the following signs & symptoms of infection both present and new to the patient? Yes (Proceed)  -LG        Indicate SIRS criteria Tachycardia > 90 bpm;Leukocytosis (WBC > 38[address] IJL)  -LG        If the answer is yes to both questions, suspicion of sepsis is present --        If severe sepsis is present AND tissue hypoperfusion perists in the hour after fluid resuscitation or lactate > 4, the patient meets criteria for SEPTIC SHOCK --        Are any of the following organ dysfunction criteria present within 6 hours of suspected infection and SIRS criteria that are NOT considered to be chronic conditions?  Yes  -LG        Organ dysfunction Lactate > 2 0 mmol/L  -LG        Date of presentation of severe sepsis --        Time of presentation of severe sepsis 1732  -LG        Tissue hypoperfusion persists in the hour after crystalloid fluid administration, evidenced, by either: --        Was hypotension present within one hour of the conclusion of crystalloid fluid administration? --        Date of presentation of septic shock --        Time of presentation of septic shock --              User Key  (r) = Recorded By, (t) = Taken By, (c) = Cosigned By    [address] Name Provider Type    LG Samuel Jasmine DO Physician

## 2022-07-28 LAB
ALBUMIN SERPL BCP-MCNC: 2.8 G/DL (ref 3.5–5)
ALP SERPL-CCNC: 66 U/L (ref 46–116)
ALT SERPL W P-5'-P-CCNC: 21 U/L (ref 12–78)
ANION GAP SERPL CALCULATED.3IONS-SCNC: 10 MMOL/L (ref 4–13)
AST SERPL W P-5'-P-CCNC: 11 U/L (ref 5–45)
BASOPHILS # BLD AUTO: 0.03 THOUSANDS/ΜL (ref 0–0.1)
BASOPHILS NFR BLD AUTO: 0 % (ref 0–1)
BILIRUB SERPL-MCNC: 0.4 MG/DL (ref 0.2–1)
BUN SERPL-MCNC: 9 MG/DL (ref 5–25)
CA-I BLD-SCNC: 1.01 MMOL/L (ref 1.12–1.32)
CALCIUM ALBUM COR SERPL-MCNC: 8.4 MG/DL (ref 8.3–10.1)
CALCIUM SERPL-MCNC: 7.4 MG/DL (ref 8.3–10.1)
CHLORIDE SERPL-SCNC: 105 MMOL/L (ref 96–108)
CO2 SERPL-SCNC: 26 MMOL/L (ref 21–32)
CREAT SERPL-MCNC: 1.02 MG/DL (ref 0.6–1.3)
EOSINOPHIL # BLD AUTO: 0.19 THOUSAND/ΜL (ref 0–0.61)
EOSINOPHIL NFR BLD AUTO: 1 % (ref 0–6)
ERYTHROCYTE [DISTWIDTH] IN BLOOD BY AUTOMATED COUNT: 12.8 % (ref 11.6–15.1)
GFR SERPL CREATININE-BSD FRML MDRD: 61 ML/MIN/1.73SQ M
GLUCOSE SERPL-MCNC: 187 MG/DL (ref 65–140)
GLUCOSE SERPL-MCNC: 241 MG/DL (ref 65–140)
GLUCOSE SERPL-MCNC: 245 MG/DL (ref 65–140)
GLUCOSE SERPL-MCNC: 249 MG/DL (ref 65–140)
GLUCOSE SERPL-MCNC: 264 MG/DL (ref 65–140)
GLUCOSE SERPL-MCNC: 280 MG/DL (ref 65–140)
GLUCOSE SERPL-MCNC: 307 MG/DL (ref 65–140)
HCT VFR BLD AUTO: 32.8 % (ref 34.8–46.1)
HGB BLD-MCNC: 10.6 G/DL (ref 11.5–15.4)
IMM GRANULOCYTES # BLD AUTO: 0.11 THOUSAND/UL (ref 0–0.2)
IMM GRANULOCYTES NFR BLD AUTO: 1 % (ref 0–2)
LACTATE SERPL-SCNC: 1.8 MMOL/L (ref 0.5–2)
LIPASE SERPL-CCNC: 44 U/L (ref 73–393)
LYMPHOCYTES # BLD AUTO: 0.95 THOUSANDS/ΜL (ref 0.6–4.47)
LYMPHOCYTES NFR BLD AUTO: 5 % (ref 14–44)
MAGNESIUM SERPL-MCNC: 2.7 MG/DL (ref 1.6–2.6)
MCH RBC QN AUTO: 29.2 PG (ref 26.8–34.3)
MCHC RBC AUTO-ENTMCNC: 32.3 G/DL (ref 31.4–37.4)
MCV RBC AUTO: 90 FL (ref 82–98)
MONOCYTES # BLD AUTO: 1.09 THOUSAND/ΜL (ref 0.17–1.22)
MONOCYTES NFR BLD AUTO: 6 % (ref 4–12)
NEUTROPHILS # BLD AUTO: 15.9 THOUSANDS/ΜL (ref 1.85–7.62)
NEUTS SEG NFR BLD AUTO: 87 % (ref 43–75)
NRBC BLD AUTO-RTO: 0 /100 WBCS
PHOSPHATE SERPL-MCNC: 3.7 MG/DL (ref 2.7–4.5)
PLATELET # BLD AUTO: 227 THOUSANDS/UL (ref 149–390)
PMV BLD AUTO: 9.3 FL (ref 8.9–12.7)
POTASSIUM SERPL-SCNC: 4.3 MMOL/L (ref 3.5–5.3)
PROCALCITONIN SERPL-MCNC: 49.96 NG/ML
PROT SERPL-MCNC: 6 G/DL (ref 6.4–8.4)
RBC # BLD AUTO: 3.63 MILLION/UL (ref 3.81–5.12)
SODIUM SERPL-SCNC: 141 MMOL/L (ref 135–147)
WBC # BLD AUTO: 18.27 THOUSAND/UL (ref 4.31–10.16)

## 2022-07-28 PROCEDURE — 80053 COMPREHEN METABOLIC PANEL: CPT

## 2022-07-28 PROCEDURE — 82330 ASSAY OF CALCIUM: CPT

## 2022-07-28 PROCEDURE — 83690 ASSAY OF LIPASE: CPT

## 2022-07-28 PROCEDURE — 83605 ASSAY OF LACTIC ACID: CPT

## 2022-07-28 PROCEDURE — 83735 ASSAY OF MAGNESIUM: CPT

## 2022-07-28 PROCEDURE — 85025 COMPLETE CBC W/AUTO DIFF WBC: CPT

## 2022-07-28 PROCEDURE — 84100 ASSAY OF PHOSPHORUS: CPT

## 2022-07-28 PROCEDURE — 99232 SBSQ HOSP IP/OBS MODERATE 35: CPT | Performed by: HOSPITALIST

## 2022-07-28 PROCEDURE — 84145 PROCALCITONIN (PCT): CPT

## 2022-07-28 PROCEDURE — 93005 ELECTROCARDIOGRAM TRACING: CPT

## 2022-07-28 PROCEDURE — 82948 REAGENT STRIP/BLOOD GLUCOSE: CPT

## 2022-07-28 RX ORDER — ONDANSETRON 2 MG/ML
4 INJECTION INTRAMUSCULAR; INTRAVENOUS EVERY 6 HOURS PRN
Status: DISCONTINUED | OUTPATIENT
Start: 2022-07-28 | End: 2022-07-31 | Stop reason: HOSPADM

## 2022-07-28 RX ORDER — ALBUMIN, HUMAN INJ 5% 5 %
25 SOLUTION INTRAVENOUS ONCE
Status: COMPLETED | OUTPATIENT
Start: 2022-07-28 | End: 2022-07-28

## 2022-07-28 RX ORDER — CEFEPIME HYDROCHLORIDE 2 G/50ML
2000 INJECTION, SOLUTION INTRAVENOUS EVERY 8 HOURS
Status: DISCONTINUED | OUTPATIENT
Start: 2022-07-28 | End: 2022-07-30

## 2022-07-28 RX ORDER — POTASSIUM CHLORIDE 14.9 MG/ML
20 INJECTION INTRAVENOUS ONCE
Status: COMPLETED | OUTPATIENT
Start: 2022-07-28 | End: 2022-07-28

## 2022-07-28 RX ORDER — SODIUM CHLORIDE, SODIUM GLUCONATE, SODIUM ACETATE, POTASSIUM CHLORIDE, MAGNESIUM CHLORIDE, SODIUM PHOSPHATE, DIBASIC, AND POTASSIUM PHOSPHATE .53; .5; .37; .037; .03; .012; .00082 G/100ML; G/100ML; G/100ML; G/100ML; G/100ML; G/100ML; G/100ML
500 INJECTION, SOLUTION INTRAVENOUS ONCE
Status: COMPLETED | OUTPATIENT
Start: 2022-07-28 | End: 2022-07-28

## 2022-07-28 RX ADMIN — CEFEPIME HYDROCHLORIDE 2000 MG: 2 INJECTION, SOLUTION INTRAVENOUS at 20:03

## 2022-07-28 RX ADMIN — ONDANSETRON 4 MG: 2 INJECTION INTRAMUSCULAR; INTRAVENOUS at 22:37

## 2022-07-28 RX ADMIN — HEPARIN SODIUM 5000 UNITS: 5000 INJECTION INTRAVENOUS; SUBCUTANEOUS at 05:31

## 2022-07-28 RX ADMIN — ALBUMIN (HUMAN) 25 G: 12.5 INJECTION, SOLUTION INTRAVENOUS at 05:55

## 2022-07-28 RX ADMIN — INSULIN LISPRO 1 UNITS: 100 INJECTION, SOLUTION INTRAVENOUS; SUBCUTANEOUS at 16:42

## 2022-07-28 RX ADMIN — Medication 6 MG: at 21:56

## 2022-07-28 RX ADMIN — INSULIN LISPRO 3 UNITS: 100 INJECTION, SOLUTION INTRAVENOUS; SUBCUTANEOUS at 15:37

## 2022-07-28 RX ADMIN — ALBUMIN (HUMAN) 25 G: 12.5 INJECTION, SOLUTION INTRAVENOUS at 00:08

## 2022-07-28 RX ADMIN — CEFTRIAXONE 1000 MG: 1 INJECTION, SOLUTION INTRAVENOUS at 17:32

## 2022-07-28 RX ADMIN — ESCITALOPRAM OXALATE 10 MG: 10 TABLET ORAL at 09:58

## 2022-07-28 RX ADMIN — SODIUM CHLORIDE, SODIUM GLUCONATE, SODIUM ACETATE, POTASSIUM CHLORIDE, MAGNESIUM CHLORIDE, SODIUM PHOSPHATE, DIBASIC, AND POTASSIUM PHOSPHATE 500 ML: .53; .5; .37; .037; .03; .012; .00082 INJECTION, SOLUTION INTRAVENOUS at 05:46

## 2022-07-28 RX ADMIN — HEPARIN SODIUM 5000 UNITS: 5000 INJECTION INTRAVENOUS; SUBCUTANEOUS at 15:38

## 2022-07-28 RX ADMIN — ACETAMINOPHEN 650 MG: 325 TABLET ORAL at 21:56

## 2022-07-28 RX ADMIN — SODIUM CHLORIDE, SODIUM GLUCONATE, SODIUM ACETATE, POTASSIUM CHLORIDE, MAGNESIUM CHLORIDE, SODIUM PHOSPHATE, DIBASIC, AND POTASSIUM PHOSPHATE 500 ML: .53; .5; .37; .037; .03; .012; .00082 INJECTION, SOLUTION INTRAVENOUS at 00:12

## 2022-07-28 RX ADMIN — ASPIRIN 81 MG: 81 TABLET, COATED ORAL at 09:58

## 2022-07-28 RX ADMIN — ESCITALOPRAM OXALATE 10 MG: 10 TABLET ORAL at 17:44

## 2022-07-28 RX ADMIN — ATORVASTATIN CALCIUM 80 MG: 40 TABLET, FILM COATED ORAL at 09:58

## 2022-07-28 RX ADMIN — SODIUM CHLORIDE, SODIUM GLUCONATE, SODIUM ACETATE, POTASSIUM CHLORIDE, MAGNESIUM CHLORIDE, SODIUM PHOSPHATE, DIBASIC, AND POTASSIUM PHOSPHATE 125 ML/HR: .53; .5; .37; .037; .03; .012; .00082 INJECTION, SOLUTION INTRAVENOUS at 16:34

## 2022-07-28 RX ADMIN — SODIUM CHLORIDE, SODIUM GLUCONATE, SODIUM ACETATE, POTASSIUM CHLORIDE, MAGNESIUM CHLORIDE, SODIUM PHOSPHATE, DIBASIC, AND POTASSIUM PHOSPHATE 125 ML/HR: .53; .5; .37; .037; .03; .012; .00082 INJECTION, SOLUTION INTRAVENOUS at 08:31

## 2022-07-28 RX ADMIN — POTASSIUM CHLORIDE 20 MEQ: 14.9 INJECTION, SOLUTION INTRAVENOUS at 01:43

## 2022-07-28 RX ADMIN — INSULIN LISPRO 3 UNITS: 100 INJECTION, SOLUTION INTRAVENOUS; SUBCUTANEOUS at 09:52

## 2022-07-28 RX ADMIN — SODIUM CHLORIDE, SODIUM GLUCONATE, SODIUM ACETATE, POTASSIUM CHLORIDE, MAGNESIUM CHLORIDE, SODIUM PHOSPHATE, DIBASIC, AND POTASSIUM PHOSPHATE 125 ML/HR: .53; .5; .37; .037; .03; .012; .00082 INJECTION, SOLUTION INTRAVENOUS at 04:16

## 2022-07-28 RX ADMIN — INSULIN LISPRO 2 UNITS: 100 INJECTION, SOLUTION INTRAVENOUS; SUBCUTANEOUS at 21:51

## 2022-07-28 RX ADMIN — CLOPIDOGREL BISULFATE 75 MG: 75 TABLET ORAL at 09:58

## 2022-07-28 RX ADMIN — POTASSIUM PHOSPHATE, MONOBASIC AND POTASSIUM PHOSPHATE, DIBASIC 12 MMOL: 224; 236 INJECTION, SOLUTION, CONCENTRATE INTRAVENOUS at 00:42

## 2022-07-28 RX ADMIN — ACETAMINOPHEN 650 MG: 325 TABLET ORAL at 15:33

## 2022-07-28 RX ADMIN — HEPARIN SODIUM 5000 UNITS: 5000 INJECTION INTRAVENOUS; SUBCUTANEOUS at 21:53

## 2022-07-28 NOTE — BRIEF OP NOTE (RAD/CATH)
INTERVENTIONAL RADIOLOGY PROCEDURE NOTE    Date: 7/27/2022    Procedure: Left PCN placement    Preoperative diagnosis:   1  Severe sepsis ([address] )    2  UTI (urinary tract infection)    3  Hydroureter on left    4  Emphysematous pyelonephritis of left kidney    5  Other hydronephrosis         Postoperative diagnosis: Same  Surgeon: Danica Izaguirre MD     Assistant: None  No qualified resident was available  Blood loss: 1 mL    Specimens: 20 mL urine aspirate sent to lab  Findings:   1  Successful left PCN placement  2  Severe left hydronephrosis  Complications: None immediate      Anesthesia: conscious sedation and local

## 2022-07-28 NOTE — H&P
[place]  H&P- Michelle Ford 1965, 64 y o  female MRN: [id number]  Unit/Bed#: -01 SDU Encounter: [id number]  Primary Care Provider: JEANCARLOS Logan   Date and time admitted to hospital: 7/27/2022  3:29 PM    * Sepsis [facility])  Assessment & Plan  Presents to the ED with nausea, vomiting and abdominal pain  · UA: Large leukocytes, + nitrites, innumerable WBC and bacteria  · CT scan revealed moderate left hydronephrosis with extensive gas throughout the left renal pelvis and throughout the left ureter in keeping with an emphysematous UTI     · Given 1 time dose of ceftriaxone in ED  · Will continue ceftriaxone daily   · Blood cultures x2 pending  · Urine culture pending as well as culture from IR procedure pending  · IR for left PCN on 7/27  · LA: 2 7-3 5  · Continue to trend  · Ordered 1L Isolyte bolus now  · Maintenance IV fluid at 125cc/hr   · Procal: 3 4   · Will trend daily  · Monitor fever - PRN Tylenol  · Monitor creatinine - 1 14 on admission   · Baseline 0 8-0 9   · Continue IVF fluid resuscitation   · Monitor output from Etienne and PCN     Pyelitis  Assessment & Plan  · Plan as noted in sepsis  · Monitor PCN output  · No noted emphysematous drainage from PCN or etienne     Hypertension  Assessment & Plan  · Holding all antihypertensives in setting of fluid resuscitation and sepsis  · Will restart as necessary  · Home regimen: HCTZ, Coreg and Losartan     Coronary artery disease involving coronary bypass graft of native heart without angina pectoris  Assessment & Plan  · Maintain on aspirin and Plavix  · Will continue in AM    Hyperlipidemia  Assessment & Plan  · Continue statin    BMI 36 0-36 9,adult  Assessment & Plan  · Encouraged diet modifications    Depression, recurrent (HCC)  Assessment & Plan  · Continue Lexapro    Type 2 diabetes mellitus, without long-term current use of insulin (HCC)  Assessment & Plan  Lab Results   Component Value Date    HGBA1C 7 3 (H) 06/03/2022       Recent Labs     07/27/22 2127   POCGLU 196*       Blood Sugar Average: Last 72 hrs:  (P) 196     · Holding oral hyperglycemics  · Sliding scale insulin coverage      -------------------------------------------------------------------------------------------------------------  Chief Complaint: " I was nauseous and vomiting at home "     History of Present Illness   HX and PE limited by:  Lujean Galeazzi is a 64 y o  female with a past medical history of type 2 diabetes CABG x2, depression, hyperlipidemia, and hypertension who presents with nausea and vomiting at home  Upon arrival to the ED patient's UA was concerning for UTI  CT scan revealed left hydronephrosis with extensive gas throughout the left renal pelvis consistent with emphysematous pyelitis  Urology and IR were consulted and decision to take patient for percutaneous nephrostomy tube on 07/27  Patient remained hemodynamically stable meeting sepsis criteria  Status post IR procedure patient was admitted to step-down level 2 for close hemodynamic monitoring  History obtained from chart review and the patient   -------------------------------------------------------------------------------------------------------------  Dispo: Admit to Stepdown Level 2    Code Status: Level 1 - Full Code  --------------------------------------------------------------------------------------------------------------  Review of Systems   Constitutional: Positive for appetite change and chills  Gastrointestinal: Positive for abdominal pain, diarrhea, nausea and vomiting  A 12-point, complete review of systems was reviewed and negative except as stated above     Physical Exam  HENT:      Head: Normocephalic  Mouth/Throat:      Mouth: Mucous membranes are moist    Eyes:      Pupils: Pupils are equal, round, and reactive to light  Cardiovascular:      Rate and Rhythm: Normal rate and regular rhythm  Pulses: Normal pulses  Pulmonary:      Effort: Pulmonary effort is normal       Breath sounds: Normal breath sounds  Abdominal:      General: Bowel sounds are normal       Palpations: Abdomen is soft  Comments: Left-sided nephrostomy tube present - clear pink tinged urine in collection bag   Genitourinary:     Comments: Garcia present with clear urine present  Musculoskeletal:      Right lower leg: No edema  Left lower leg: No edema  Skin:     General: Skin is warm and dry  Capillary Refill: Capillary refill takes less than 2 seconds  Findings: No rash  Neurological:      General: No focal deficit present  Mental Status: She is alert and oriented to person, place, and time  --------------------------------------------------------------------------------------------------------------  Vitals:   Vitals:    07/27/22 2021 07/27/22 2022 07/27/22 2025 07/27/22 2027   BP:  119/72 121/69 119/72   BP Location:       Pulse: (!) 116 (!) 112 (!) 116 (!) 116   Resp: 13 (!) 6 (!) 8 (!) 7   Temp:       TempSrc:       SpO2: 92% 92% 94% 94%   Weight:       Height:         Temp  Min: 98 5 °F (36 9 °C)  Max: 98 5 °F (36 9 °C)     Height: 5' 1" (154 9 cm)  Body mass index is 35 14 kg/m²      Laboratory and Diagnostics:  Results from last 7 days   Lab Units 07/27/22  1458   WBC Thousand/uL 20 48*   HEMOGLOBIN g/dL 14 8   HEMATOCRIT % 43 2   PLATELETS Thousands/uL 295   NEUTROS PCT % 86*   MONOS PCT % 7     Results from last 7 days   Lab Units 07/27/22  1458   SODIUM mmol/L 132*   POTASSIUM mmol/L 4 1   CHLORIDE mmol/L 96   CO2 mmol/L 27   ANION GAP mmol/L 9   BUN mg/dL 11   CREATININE mg/dL 1 14   CALCIUM mg/dL 9 2   GLUCOSE RANDOM mg/dL 367*   ALT U/L 31   AST U/L 15   ALK PHOS U/L 108   ALBUMIN g/dL 3 6   TOTAL BILIRUBIN mg/dL 0 80          Results from last 7 days   Lab Units 07/27/22  1548   INR  0 95   PTT seconds 31          Results from last 7 days   Lab Units 07/27/22  1922 07/27/22  1548   LACTIC ACID mmol/L 3  5* 2 7*     ABG:    VBG:    Results from last 7 days   Lab Units 22  1458   PROCALCITONIN ng/ml 0 34*       Micro:        EKG: ST   Imaging: I have personally reviewed pertinent films in PACS      Historical Information   Past Medical History:   Diagnosis Date    Circulation problem     Coronary artery disease     Depression     [facility]     Dyspnea on exertion 2019    Glaucoma     Hearing problem     Heart attack [facility]     Heart failure (HCC)     Heart valve problem     High blood pressure     High cholesterol     Obesity, morbid ([facility] ) 2021    Open injury of heart     Sleep apnea      Past Surgical History:   Procedure Laterality Date    CORONARY ARTERY BYPASS GRAFT      CORONARY STENT PLACEMENT  2010    CORONARY STENT PLACEMENT  2015    IR NEPHROSTOMY TUBE PLACEMENT  2022    VAGINAL DELIVERY      VAGINAL DELIVERY       Social History   Social History     Substance and Sexual Activity   Alcohol Use Yes    Comment: social     Social History     Substance and Sexual Activity   Drug Use Yes    Types: Marijuana     Social History     Tobacco Use   Smoking Status Former Smoker    Types: Cigarettes    Quit date:     Years since quittin 5   Smokeless Tobacco Never Used     Exercise History: Not assessed   Family History:   Family History   Problem Relation Age of Onset    Hypertension Father     Coronary artery disease Father     Sudden death Father         cardiac 2014     I have reviewed this patient's family history and commented on sigificant items within the HPI      Medications:  Current Facility-Administered Medications   Medication Dose Route Frequency    acetaminophen (TYLENOL) tablet 650 mg  650 mg Oral Q6H PRN    [START ON 2022] aspirin (ECOTRIN LOW STRENGTH) EC tablet 81 mg  81 mg Oral Daily    [START ON 2022] atorvastatin (LIPITOR) tablet 80 mg  80 mg Oral Daily    [START ON 2022] cefTRIAXone (ROCEPHIN) IVPB (premix in dextrose) 1,000 mg 50 mL  1,000 mg Intravenous Q24H    [START ON 7/28/2022] clopidogrel (PLAVIX) tablet 75 mg  75 mg Oral Daily    [START ON 7/28/2022] escitalopram (LEXAPRO) tablet 10 mg  10 mg Oral BID    heparin (porcine) subcutaneous injection 5,000 Units  5,000 Units Subcutaneous Q8H [facility]    insulin lispro (HumaLOG) 100 units/mL subcutaneous injection 1-5 Units  1-5 Units Subcutaneous HS    [START ON 7/28/2022] insulin lispro (HumaLOG) 100 units/mL subcutaneous injection 1-6 Units  1-6 Units Subcutaneous TID AC    melatonin tablet 6 mg  6 mg Oral HS    [START ON 7/28/2022] senna-docusate sodium (SENOKOT S) 8 6-50 mg per tablet 2 tablet  2 tablet Oral BID     Home medications:  Prior to Admission Medications   Prescriptions Last Dose Informant Patient Reported? Taking?    MELATONIN PO 7/26/2022 at Unknown time  Yes Yes   Sig: Take 10 mg by mouth daily at bedtime   aspirin (ECOTRIN LOW STRENGTH) 81 mg EC tablet 7/26/2022 at Unknown time  Yes Yes   Sig: Take 81 mg by mouth daily   atorvastatin (LIPITOR) 80 mg tablet 7/26/2022 at Unknown time  No Yes   Sig: take 1 tablet by mouth daily   carvedilol (COREG) 6 25 mg tablet 7/26/2022 at Unknown time  No Yes   Sig: Take 1 tablet (6 25 mg total) by mouth 2 (two) times a day with meals   clopidogrel (PLAVIX) 75 mg tablet 7/26/2022 at Unknown time  No Yes   Sig: Take 1 tablet (75 mg total) by mouth daily   escitalopram (LEXAPRO) 10 mg tablet 7/26/2022 at Unknown time  No Yes   Sig: Take 1 tablet (10 mg total) by mouth 2 (two) times a day   glipiZIDE (GLUCOTROL XL) 10 mg 24 hr tablet 7/26/2022 at Unknown time  No Yes   Sig: take 1 tablet by mouth daily   hydrochlorothiazide (HYDRODIURIL) 12 5 mg tablet 7/26/2022 at Unknown time  No Yes   Sig: Take 1 tablet (12 5 mg total) by mouth daily   losartan (COZAAR) 50 mg tablet 7/26/2022 at Unknown time  No Yes   Sig: take 1 tablet by mouth daily   zolpidem (AMBIEN) 10 mg tablet 7/26/2022 at Unknown time  No Yes   Sig: TAKE 1 TABLET BY MOUTH DAILY AT BEDTIME AS NEEDED FOR SLEEP  Facility-Administered Medications: None     Allergies: Allergies   Allergen Reactions    Metformin Diarrhea    Penicillins Hives       ------------------------------------------------------------------------------------------------------------  Advance Directive and Living Will:      Power of :    POLST:    ------------------------------------------------------------------------------------------------------------  Anticipated Length of Stay is > 2 midnights    Care Time Delivered:   No Critical Care time spent       JEANCARLOS Gonzalez        Portions of the record may have been created with voice recognition software  Occasional wrong word or "sound a like" substitutions may have occurred due to the inherent limitations of voice recognition software    Read the chart carefully and recognize, using context, where substitutions have occurred

## 2022-07-28 NOTE — ASSESSMENT & PLAN NOTE
Lab Results   Component Value Date    HGBA1C 7 3 (H) 06/03/2022       Recent Labs     07/27/22  1559 07/27/22  2127 07/28/22  0754   POCGLU 307* 196* 264*       Blood Sugar Average: Last 72 hrs:  (P) 457 [id number]     · Holding oral hyperglycemics  · Sliding scale insulin coverage

## 2022-07-28 NOTE — CASE MANAGEMENT
Case Management Assessment & Discharge Planning Note    Patient name Josiah Gutierrez  Location  SDU/-01 S* MRN [id number]  : 1965 Date 2022       Current Admission Date: 2022  Current Admission Diagnosis:Sepsis [facility])   Patient Active Problem List    Diagnosis Date Noted    [facility] 2022    Pyelitis 2022    Sleep apnea 01/10/2020    BMI 36 0-36 9,adult 01/10/2020    Insomnia 01/10/2020    Depression, recurrent ([facility] ) 01/10/2020    S/P CABG x 2 2019    Coronary artery disease involving coronary bypass graft of native heart without angina pectoris 2019    S/P insertion of non-drug eluting coronary artery stent 2019    Coronary artery disease involving native coronary artery of native heart without angina pectoris 2015    Hypertension 2015    Hyperlipidemia 2015    Type 2 diabetes mellitus, without long-term current use of insulin ([facility] ) 2015      LOS (days): 1  Geometric Mean LOS (GMLOS) (days):   Days to GMLOS:     OBJECTIVE:    Risk of Unplanned Readmission Score: 11 85     Current admission status: Inpatient    Preferred Pharmacy:   [facility]/pharmacy #[id number] [address]  Phone: [phone] Fax: [phone]    Primary Care Provider: JEANCARLOS Holt    Primary Insurance: MEDICARE  Secondary Insurance:     ASSESSMENT:  Kaylen [address] Representative - Son   Primary Phone: [phone] (Home)               Advance Directives  Does patient have a [address] [place] [address] Avenue?: No  Was patient offered paperwork?: Yes (Declined)  Does patient currently have a Health Care decision maker?: Yes, please see Health Care Proxy section  Does patient have Advance Directives?: No  Was patient offered paperwork?: Yes (Declined)  Primary Contact: Tesfaye Ser: son: [phone]    Readmission Root Cause  30 Day Readmission: No    Patient Information  Admitted from[de-identified] Home  Mental Status: Alert  During Assessment patient was accompanied by: Not accompanied during assessment  Assessment information provided by[de-identified] Patient  Primary Caregiver: Self  Support Systems: Mehul Driscoll of Residence: [address] [place] [address] do you live in?: [address] [facility] entry access options   Select all that apply : Stairs  Number of steps to enter home : One Flight (1 avril, flight to 2nd floor)  Type of Current Residence: Apartment  Floor Level: 2  Upon entering residence, is there a bedroom on the main floor (no further steps)?: Yes  Upon entering residence, is there a bathroom on the main floor (no further steps)?: Yes  In the last 12 months, was there a time when you were not able to pay the mortgage or rent on time?: No  In the last 12 months, how many places have you lived?: 1  In the last 12 months, was there a time when you did not have a steady place to sleep or slept in a shelter (including now)?: No  Homeless/housing insecurity resource given?: N/A  Living Arrangements: Lives Alone  Is patient a ?: No    Activities of Daily Living Prior to Admission  Functional Status: Independent  Completes ADLs independently?: Yes  Ambulates independently?: Yes  Does patient use assisted devices?: No  Does patient currently own DME?: Yes  What DME does the patient currently own?: CPAP  Does patient have a history of Outpatient Therapy (PT/OT)?: No  Does the patient have a history of Short-Term Rehab?: No  Does patient have a history of HHC?: No  Does patient currently have [facility]?: No    Patient Information Continued  Income Source: Unemployed  Does patient have prescription coverage?: Yes  Within the past 12 months, you worried that your food would run out before you got the money to buy more : Never true  Within the past 12 months, the food you bought just didn't last and you didn't have money to get more : Never true  Food insecurity resource given?: N/A  Does patient receive dialysis treatments?: No  Does patient have a history of substance abuse?: No  Does patient have a history of Mental Health Diagnosis?: Yes (depression)  Has patient received inpatient treatment related to mental health in the last 2 years?: No    Means of Transportation  Means of Transport to Appts[de-identified] Friends  In the past 12 months, has lack of transportation kept you from medical appointments or from getting medications?: No  In the past 12 months, has lack of transportation kept you from meetings, work, or from getting things needed for daily living?: No  Was application for public transport provided?: N/A    DISCHARGE DETAILS:  Additional Comments: Met with Pt  Pt presents AA&Ox3  Pt reports she lives alone in 2nd floor apartment([address] [facility] 1 avril and flight to 2nd floor  Independent PTA  Has CPAP  Denies hx of VNA/SNF/MH/D&A  Pt reports her friend provides transportation as she does not have a car   Pt reports her friend or son will transport home and declines discharge services

## 2022-07-28 NOTE — ASSESSMENT & PLAN NOTE
Presents to the ED with nausea, vomiting and abdominal pain  · UA: Large leukocytes, + nitrites, innumerable WBC and bacteria  · CT scan revealed moderate left hydronephrosis with extensive gas throughout the left renal pelvis and throughout the left ureter in keeping with an emphysematous UTI     · Given 1 time dose of ceftriaxone in ED  · Will continue ceftriaxone daily   · Blood cultures x2 pending  · Urine culture pending as well as culture from IR procedure pending  · IR for left PCN on 7/27  · LA: 2 7-3 5  · Continue to trend  · Ordered 1L Isolyte bolus now  · Maintenance IV fluid at 125cc/hr   · Procal: 3 4   · Will trend daily  · Monitor fever - PRN Tylenol  · Monitor creatinine - 1 14 on admission   · Baseline 0 8-0 9   · Continue IVF fluid resuscitation   · Monitor output from Garcia and PCN

## 2022-07-28 NOTE — SEDATION DOCUMENTATION
Left nephrostomy tube placement completed by Dr Lorraine Langston without complications, pt tolerated well  Pt is for a one hour bedrest starting at 2030  Report called to ICU

## 2022-07-28 NOTE — QUICK NOTE
Progress Note - Triage Assessment   Michelle De La Garza 64 y o  female MRN: [id number]    Time Called: 1907  Date Called: 07/27/22  Room#: TR 13a  Time Evaluated: 1920  Person requesting evaluation: Dr Diane Dobosn to ED to evaluate patient for possible admission to Critical Care Service, chart reviewed and patient evaluated  Patient noted to have severe sepsis on CT scan revealed emphysematous pyelitis requiring PCN  Decision made for patient to go to IR this evening and remain at [facility]  Upon evaluation for critical or SD 1 needs, patient was AAOx3 on evaluation  Still endorsing nausea  Hemodynamically stable on RA preparing to go to IR  Jerelene Glimpse reveals an elevated WBC, procal and LA No indication for SD1 or critical care needs at this time  If patient's clincal status were to worsen post IR intervention - ICU team would be happy to re-evaluate at that time  Discussed plan for SD 2 admission with Camille Angelucci, Dr Rob Zhao in the ED and Urology IRIS Vu via Mitzi     Case overview given to Critical Care attending Dr Sanjay Gilbert via Mitzi and aware of no current need for critical care or stepdown 1 status at this time  Triage Assessment:     Patient appropriate to be admitted to stepdown level 2  If any questions or concerns please TigerConnect the critical care AP, thanks!

## 2022-07-28 NOTE — ED NOTES
Ranjit bush and I wasted 50 mcg of fentanyl  Two vials were pulled, we returned one, we were unable to waste [facility]  We both witnessed waste       Silke Castillo, JOLENE  07/27/22 2102

## 2022-07-28 NOTE — QUICK NOTE
Patient noted to be hypotensive  Continuing IV resuscitation and trending endpoint  Patient is AAOx3  Actively repleteing electrolytes given 2200 lab work

## 2022-07-28 NOTE — PLAN OF CARE
Problem: Potential for Falls  Goal: Patient will remain free of falls  Description: INTERVENTIONS:  - Educate patient/family on patient safety including physical limitations  - Instruct patient to call for assistance with activity   - Consult OT/PT to assist with strengthening/mobility   - Keep Call bell within reach  - Keep bed low and locked with side rails adjusted as appropriate  - Keep care items and personal belongings within reach  - Initiate and maintain comfort rounds  - Make Fall Risk Sign visible to staff  - Offer Toileting every 2 Hours, in advance of need  - Initiate/Maintain bed alarm  - Obtain necessary fall risk management equipment  - Apply yellow socks and bracelet for high fall risk patients  - Consider moving patient to room near nurses station  Outcome: Progressing     Problem: MOBILITY - ADULT  Goal: Maintain or return to baseline ADL function  Description: INTERVENTIONS:  -  Assess patient's ability to carry out ADLs; assess patient's baseline for ADL function and identify physical deficits which impact ability to perform ADLs (bathing, care of mouth/teeth, toileting, grooming, dressing, etc )  - Assess/evaluate cause of self-care deficits   - Assess range of motion  - Assess patient's mobility; develop plan if impaired  - Assess patient's need for assistive devices and provide as appropriate  - Encourage maximum independence but intervene and supervise when necessary  - Involve family in performance of ADLs  - Assess for home care needs following discharge   - Consider OT consult to assist with ADL evaluation and planning for discharge  - Provide patient education as appropriate  Outcome: Progressing  Goal: Maintains/Returns to pre admission functional level  Description: INTERVENTIONS:  - Perform BMAT or MOVE assessment daily    - Set and communicate daily mobility goal to care team and patient/family/caregiver     - Collaborate with rehabilitation services on mobility goals if consulted  - Record patient progress and toleration of activity level   Outcome: Progressing     Problem: PAIN - ADULT  Goal: Verbalizes/displays adequate comfort level or baseline comfort level  Description: Interventions:  - Encourage patient to monitor pain and request assistance  - Assess pain using appropriate pain scale  - Administer analgesics based on type and severity of pain and evaluate response  - Implement non-pharmacological measures as appropriate and evaluate response  - Consider cultural and social influences on pain and pain management  - Notify physician/advanced practitioner if interventions unsuccessful or patient reports new pain  Outcome: Progressing     Problem: INFECTION - ADULT  Goal: Absence or prevention of progression during hospitalization  Description: INTERVENTIONS:  - Assess and monitor for signs and symptoms of infection  - Monitor lab/diagnostic results  - Monitor all insertion sites, i e  indwelling lines, tubes, and drains  - Monitor endotracheal if appropriate and nasal secretions for changes in amount and color  - [place] appropriate cooling/warming therapies per order  - Administer medications as ordered  - Instruct and encourage patient and family to use good hand hygiene technique  - Identify and instruct in appropriate isolation precautions for identified infection/condition  Outcome: Progressing  Goal: Absence of fever/infection during neutropenic period  Description: INTERVENTIONS:  - Monitor WBC    Outcome: Progressing     Problem: SAFETY ADULT  Goal: Patient will remain free of falls  Description: INTERVENTIONS:  - Educate patient/family on patient safety including physical limitations  - Instruct patient to call for assistance with activity   - Consult OT/PT to assist with strengthening/mobility   - Keep Call bell within reach  - Keep bed low and locked with side rails adjusted as appropriate  - Keep care items and personal belongings within reach  - Initiate and maintain comfort rounds  - Make Fall Risk Sign visible to staff  - Offer Toileting every 2 Hours, in advance of need  - Initiate/Maintain bed alarm  - Obtain necessary fall risk management equipment  - Apply yellow socks and bracelet for high fall risk patients  - Consider moving patient to room near nurses station  Outcome: Progressing  Goal: Maintain or return to baseline ADL function  Description: INTERVENTIONS:  -  Assess patient's ability to carry out ADLs; assess patient's baseline for ADL function and identify physical deficits which impact ability to perform ADLs (bathing, care of mouth/teeth, toileting, grooming, dressing, etc )  - Assess/evaluate cause of self-care deficits   - Assess range of motion  - Assess patient's mobility; develop plan if impaired  - Assess patient's need for assistive devices and provide as appropriate  - Encourage maximum independence but intervene and supervise when necessary  - Involve family in performance of ADLs  - Assess for home care needs following discharge   - Consider OT consult to assist with ADL evaluation and planning for discharge  - Provide patient education as appropriate  Outcome: Progressing  Goal: Maintains/Returns to pre admission functional level  Description: INTERVENTIONS:  - Perform BMAT or MOVE assessment daily    - Set and communicate daily mobility goal to care team and patient/family/caregiver     - Collaborate with rehabilitation services on mobility goals if consulted  - Record patient progress and toleration of activity level   Outcome: Progressing     Problem: DISCHARGE PLANNING  Goal: Discharge to home or other facility with appropriate resources  Description: INTERVENTIONS:  - Identify barriers to discharge w/patient and caregiver  - Arrange for needed discharge resources and transportation as appropriate  - Identify discharge learning needs (meds, wound care, etc )  - Arrange for interpretive services to assist at discharge as needed  - Refer to Case Management Department for coordinating discharge planning if the patient needs post-hospital services based on physician/advanced practitioner order or complex needs related to functional status, cognitive ability, or social support system  Outcome: Progressing     Problem: Knowledge Deficit  Goal: Patient/family/caregiver demonstrates understanding of disease process, treatment plan, medications, and discharge instructions  Description: Complete learning assessment and assess knowledge base    Interventions:  - Provide teaching at level of understanding  - Provide teaching via preferred learning methods  Outcome: Progressing

## 2022-07-28 NOTE — ASSESSMENT & PLAN NOTE
Lab Results   Component Value Date    HGBA1C 7 3 (H) 06/03/2022       Recent Labs     07/27/22 2127   POCGLU 196*       Blood Sugar Average: Last 72 hrs:  (P) 196     · Holding oral hyperglycemics  · Sliding scale insulin coverage

## 2022-07-28 NOTE — SEPSIS NOTE
Sepsis Note   Josiah Gutierrez 64 y o  female MRN: [id number]  Unit/Bed#: -01 SDU Encounter: [id number]       qSOFA     Row Name 07/27/22 2027 07/27/22 2025 07/27/22 2022 07/27/22 2021 07/27/22 2020    Altered mental status GCS < 15 -- -- -- -- --    Respiratory Rate > / =22 0 0 0 0 0    Systolic BP < / =864 0 0 0 -- 0    Q Sofa Score 0 0 0 0 0    Row Name 07/27/22 2017 07/27/22 2015 07/27/22 2012 07/27/22 2008 07/27/22 2005    Altered mental status GCS < 15 -- -- -- -- --    Respiratory Rate > / =22 0 0 0 0 0    Systolic BP < / =033 0 0 0 0 0    Q Sofa Score 0 0 0 0 0    Row Name 07/27/22 20:04:49 07/27/22 1830 07/27/22 1800 07/27/22 1730 07/27/22 1715    Altered mental status GCS < 15 -- -- -- -- --    Respiratory Rate > / =22 0 0 0 0 0    Systolic BP < / =766 0 0 0 0 0    Q Sofa Score 0 0 0 0 0    Row Name 07/27/22 1557 07/27/22 1453             Altered mental status GCS < 15 0 --       Respiratory Rate > / =22 -- 0       Systolic BP < / =180 -- 0       Q Sofa Score 0 0                  Initial Sepsis Screening     Row Name 07/27/22 1730                Is the patient's history suggestive of a new or worsening infection? Yes (Proceed)  -LG        Suspected source of infection urinary tract infection  -LG        Are two or more of the following signs & symptoms of infection both present and new to the patient? Yes (Proceed)  -LG        Indicate SIRS criteria Tachycardia > 90 bpm;Leukocytosis (WBC > 44548 IJL)  -LG        If the answer is yes to both questions, suspicion of sepsis is present --        If severe sepsis is present AND tissue hypoperfusion perists in the hour after fluid resuscitation or lactate > 4, the patient meets criteria for SEPTIC SHOCK --        Are any of the following organ dysfunction criteria present within 6 hours of suspected infection and SIRS criteria that are NOT considered to be chronic conditions?  Yes  -LG        Organ dysfunction Lactate > 2 0 mmol/L  -LG        Date of presentation of severe sepsis --        Time of presentation of severe sepsis 1732  -LG        Tissue hypoperfusion persists in the hour after crystalloid fluid administration, evidenced, by either: --        Was hypotension present within one hour of the conclusion of crystalloid fluid administration? --        Date of presentation of septic shock --        Time of presentation of septic shock --              User Key  (r) = Recorded By, (t) = Taken By, (c) = Cosigned By    [address]  Name Provider Type     Ludy Lambert DO Physician                  Default Flowsheet Data (last 720 hours)     Sepsis [facility] Name 07/27/22 2100                   Repeat Volume Status and Tissue Perfusion Assessment Performed    Repeat Volume Status and Tissue Perfusion Assessment Performed Yes  -SS                  Volume Status and Tissue Perfusion Post Fluid Resuscitation * Must Document All *    Vital Signs Reviewed (HR, RR, BP, T) Yes  -        Shock Index Reviewed --        Arterial Oxygen Saturation Reviewed (POx, SaO2 or SpO2) --        Cardio Regular rate and rhythm  -        Pulmonary Normal effort  -        Capillary Refill Brisk  -        Peripheral Pulses Radial  -        Peripheral Pulse +2  -SS        Skin Warm  -SS        Urine output assessed Other (comment)  Garcia placed with about 150 cc UO and PCN with noteable urine as well  -SS                  *OR*   Intensive Monitoring- Must Document One of the Following Four *:    Vital Signs Reviewed --        * Central Venous Pressure (CVP or RAP) --        * Central Venous Oxygen (SVO2, ScvO2 or Oxygen saturation via central catheter) --        * Bedside Cardiovascular US in IVC diameter and % collapse --        * Passive Leg Raise OR Crystalloid Challenge --              User Key  (r) = Recorded By, (t) = Taken By, (c) = Cosigned By    [address]  Name Provider Type     Lexine Goltz, [address]  Nurse Practitioner                Patient returned from IR with PCN at this time

## 2022-07-28 NOTE — ASSESSMENT & PLAN NOTE
· Holding all antihypertensives in setting of fluid resuscitation and sepsis  · Will restart as necessary  · Home regimen: HCTZ, Coreg and Losartan

## 2022-07-28 NOTE — ASSESSMENT & PLAN NOTE
· Plan as noted in sepsis  · Monitor PCN output  · No noted emphysematous drainage from PCN or etienne

## 2022-07-28 NOTE — ASSESSMENT & PLAN NOTE
Presents to the ED with nausea, vomiting and abdominal pain  · UA: Large leukocytes, + nitrites, innumerable WBC and bacteria  · CT scan revealed moderate left hydronephrosis with extensive gas throughout the left renal pelvis and throughout the left ureter in keeping with an emphysematous UTI  · continue ceftriaxone daily   · Blood cultures x2 pending  · Urine culture pending as well as culture from IR procedure pending  · S/p PCN by IR  · No obstructing stones seen reviewed with patient, outpatient urology follow    Cont IV fluid at 125cc/hr   · Monitor fever - PRN Tylenol  · Monitor creatinine - 1 14 on admission   · Baseline 0 8-0 9   · Continue IVF fluid resuscitation   · Monitor output from Garcia and PCN

## 2022-07-28 NOTE — PROGRESS NOTES
[place]  Progress Note - Devan Cardoza 1965, 64 y o  female MRN: [id number]  Unit/Bed#: -01 SDU Encounter: [id number]  Primary Care Provider: JEANCARLOS Cee   Date and time admitted to hospital: 7/27/2022  3:29 PM    Pyelitis  Assessment & Plan  · Plan as noted in sepsis  · Monitor PCN output  · No noted emphysematous drainage from PCN or etienne     Depression, recurrent (HCC)  Assessment & Plan  · Continue Lexapro    BMI 36 0-36 9,adult  Assessment & Plan  · Encouraged diet modifications    Type 2 diabetes mellitus, without long-term current use of insulin [facility])  Assessment & Plan  Lab Results   Component Value Date    HGBA1C 7 3 (H) 06/03/2022       Recent Labs     07/27/22  1559 07/27/22  2127 07/28/22  0754   POCGLU 307* 196* 264*       Blood Sugar Average: Last 72 hrs:  (P) 198 [id number]     · Holding oral hyperglycemics  · Sliding scale insulin coverage    Hyperlipidemia  Assessment & Plan  · Continue statin    Hypertension  Assessment & Plan  · Holding all antihypertensives in setting of fluid resuscitation and sepsis  · Will restart as necessary  · Home regimen: HCTZ, Coreg and Losartan     Coronary artery disease involving coronary bypass graft of native heart without angina pectoris  Assessment & Plan  · Maintain on aspirin and Plavix  · Will continue in AM    * Sepsis [facility])  Assessment & Plan  Presents to the ED with nausea, vomiting and abdominal pain  · UA: Large leukocytes, + nitrites, innumerable WBC and bacteria  · CT scan revealed moderate left hydronephrosis with extensive gas throughout the left renal pelvis and throughout the left ureter in keeping with an emphysematous UTI  · continue ceftriaxone daily   · Blood cultures x2 pending  · Urine culture pending as well as culture from IR procedure pending  · S/p PCN by IR  · No obstructing stones seen reviewed with patient, outpatient urology follow    Cont IV fluid at 125cc/hr   · Monitor fever - PRN Tylenol  · Monitor creatinine - 1 14 on admission   · Baseline 0 8-0 9   · Continue IVF fluid resuscitation   · Monitor output from Etienne and PCN          VTE  Prophylaxis:   Pharmacologic: in place    Patient Centered Rounds: I have performed bedside rounds with nursing staff today  Discussions with Specialists or Other Care Team Provider: case management    Education and Discussions with Family / Patient: pt      Current Length of Stay: 1 day(s)    Current Patient Status: Inpatient        Code Status: Level 1 - Full Code      Subjective:   Reports diarrhea/ flank pain better  Denies fever  Blood pressures were low, better now      Patient is seen and examined at bedside  All other ROS are negative  Objective:     Vitals:   Temp (24hrs), Av 5 °F (37 5 °C), Min:98 4 °F (36 9 °C), Max:103 1 °F (39 5 °C)    Temp:  [98 4 °F (36 9 °C)-103 1 °F (39 5 °C)] 98 4 °F (36 9 °C)  HR:  [] 84  Resp:  [0-42] 28  BP: ()/(40-81) 117/60  SpO2:  [89 %-97 %] 95 %  Body mass index is 36 99 kg/m²  Input and Output Summary (last 24 hours): Intake/Output Summary (Last 24 hours) at 2022 1052  Last data filed at 2022 0947  Gross per 24 hour   Intake 5997 92 ml   Output 2030 ml   Net 3967 92 ml       Physical Exam:       GEN: No acute distress, comfortable  HEEENT: No JVD, PERRLA, no scleral icterus  RESP: Lungs clear to auscultation bilaterally  CV: RRR, +s1/s2   ABD: SOFT NON TENDER, POSITIVE BOWEL SOUNDS, NO DISTENTION  PSYCH: CALM  Gu: etienne, in place, PCN in place, clear yellow urine    NEURO: A X O X 3, NO FOCAL DEFICITS  SKIN: NO RASH  EXTREM: NO EDEMA    Additional Data:     Labs:    Results from last 7 days   Lab Units 22  0507 22  2204   WBC Thousand/uL 18 27* 12 76*   HEMOGLOBIN g/dL 10 6* 11 5   HEMATOCRIT % 32 8* 34 5*   PLATELETS Thousands/uL 227 223  223   BANDS PCT %  --  16*   NEUTROS PCT % 87*  --    LYMPHS PCT % 5*  --    LYMPHO PCT %  --  4*   MONOS PCT % 6  --    MONO PCT %  --  3*   EOS PCT % 1 0     Results from last 7 days   Lab Units 07/28/22  0507   SODIUM mmol/L 141   POTASSIUM mmol/L 4 3   CHLORIDE mmol/L 105   CO2 mmol/L 26   BUN mg/dL 9   CREATININE mg/dL 1 02   ANION GAP mmol/L 10   CALCIUM mg/dL 7 4*   ALBUMIN g/dL 2 8*   TOTAL BILIRUBIN mg/dL 0 40   ALK PHOS U/L 66   ALT U/L 21   AST U/L 11   GLUCOSE RANDOM mg/dL 280*     Results from last 7 days   Lab Units 07/27/22  1548   INR  0 95     Results from last 7 days   Lab Units 07/28/22  0754 07/27/22  2127 07/27/22  1559   POC GLUCOSE mg/dl 264* 196* 307*         Results from last 7 days   Lab Units 07/28/22  0507 07/28/22  0006 07/27/22  2204 07/27/22  1922 07/27/22  1548 07/27/22  1458   LACTIC ACID mmol/L  --  1 8 2 8* 3 5* 2 7*  --    PROCALCITONIN ng/ml 49 96*  --   --   --   --  0 34*           * I Have Reviewed All Lab Data Listed Above  Imaging:     No results found for this or any previous visit  No results found for this or any previous visit  *I have reviewed all imaging reports listed above      Recent Cultures (last 7 days):     Results from last 7 days   Lab Units 07/27/22  1548   BLOOD CULTURE  Received in Microbiology Lab  Culture in Progress  Received in Microbiology Lab  Culture in Progress         Last 24 Hours Medication List:   Current Facility-Administered Medications   Medication Dose Route Frequency Provider Last Rate    acetaminophen  650 mg Oral Q6H PRN JEANCARLOS Lundberg      aspirin  81 mg Oral Daily Josee T Choletria, JEANCARLOS      atorvastatin  80 mg Oral Daily [place] Reef Choletria, JEANCARLOS      cefTRIAXone  1,000 mg Intravenous Q24H Josee T Choletria, JEANCARLOS      clopidogrel  75 mg Oral Daily Belen Reef Choletria, JEANCARLOS      escitalopram  10 mg Oral BID JEANCARLOS Lundberg      heparin (porcine)  5,000 Units Subcutaneous Q8H [address] Josee T Choletria, CRNP      insulin lispro  1-5 Units Subcutaneous HS JEANCARLOS Lundberg      insulin lispro  1-6 Units Subcutaneous TID AC JEANCARLOS Alfred      melatonin  6 mg Oral HS [place] Killer, CRNP      multi-electrolyte  125 mL/hr Intravenous Continuous [place] Killer, CRNP 125 mL/hr (07/28/22 0416)    senna-docusate sodium  2 tablet Oral BID [place] KillerJEANCARLOS          Today, Patient Was Seen By: Analia Moya MD    ** Please Note: Dictation voice to text software may have been used in the creation of this document   **

## 2022-07-29 ENCOUNTER — APPOINTMENT (INPATIENT)
Dept: RADIOLOGY | Facility: HOSPITAL | Age: 57
DRG: 854 | End: 2022-07-29
Payer: MEDICARE

## 2022-07-29 ENCOUNTER — HOME HEALTH ADMISSION (OUTPATIENT)
Dept: HOME HEALTH SERVICES | Facility: HOME HEALTHCARE | Age: 57
End: 2022-07-29
Payer: MEDICARE

## 2022-07-29 PROBLEM — R09.02 HYPOXIA: Status: ACTIVE | Noted: 2022-07-29

## 2022-07-29 LAB
ALBUMIN SERPL BCP-MCNC: 3.1 G/DL (ref 3.5–5)
ALP SERPL-CCNC: 76 U/L (ref 46–116)
ALT SERPL W P-5'-P-CCNC: 27 U/L (ref 12–78)
ANION GAP SERPL CALCULATED.3IONS-SCNC: 8 MMOL/L (ref 4–13)
AST SERPL W P-5'-P-CCNC: 15 U/L (ref 5–45)
ATRIAL RATE: 94 BPM
BASOPHILS # BLD AUTO: 0.02 THOUSANDS/ΜL (ref 0–0.1)
BASOPHILS NFR BLD AUTO: 0 % (ref 0–1)
BILIRUB SERPL-MCNC: 0.6 MG/DL (ref 0.2–1)
BUN SERPL-MCNC: 10 MG/DL (ref 5–25)
CALCIUM ALBUM COR SERPL-MCNC: 8.6 MG/DL (ref 8.3–10.1)
CALCIUM SERPL-MCNC: 7.9 MG/DL (ref 8.3–10.1)
CHLORIDE SERPL-SCNC: 103 MMOL/L (ref 96–108)
CO2 SERPL-SCNC: 27 MMOL/L (ref 21–32)
CREAT SERPL-MCNC: 0.9 MG/DL (ref 0.6–1.3)
EOSINOPHIL # BLD AUTO: 0.07 THOUSAND/ΜL (ref 0–0.61)
EOSINOPHIL NFR BLD AUTO: 1 % (ref 0–6)
ERYTHROCYTE [DISTWIDTH] IN BLOOD BY AUTOMATED COUNT: 12.8 % (ref 11.6–15.1)
GFR SERPL CREATININE-BSD FRML MDRD: 71 ML/MIN/1.73SQ M
GLUCOSE SERPL-MCNC: 194 MG/DL (ref 65–140)
GLUCOSE SERPL-MCNC: 209 MG/DL (ref 65–140)
GLUCOSE SERPL-MCNC: 235 MG/DL (ref 65–140)
GLUCOSE SERPL-MCNC: 243 MG/DL (ref 65–140)
GLUCOSE SERPL-MCNC: 280 MG/DL (ref 65–140)
HCT VFR BLD AUTO: 33.7 % (ref 34.8–46.1)
HGB BLD-MCNC: 10.6 G/DL (ref 11.5–15.4)
IMM GRANULOCYTES # BLD AUTO: 0.11 THOUSAND/UL (ref 0–0.2)
IMM GRANULOCYTES NFR BLD AUTO: 1 % (ref 0–2)
LYMPHOCYTES # BLD AUTO: 1.55 THOUSANDS/ΜL (ref 0.6–4.47)
LYMPHOCYTES NFR BLD AUTO: 12 % (ref 14–44)
MCH RBC QN AUTO: 28.8 PG (ref 26.8–34.3)
MCHC RBC AUTO-ENTMCNC: 31.5 G/DL (ref 31.4–37.4)
MCV RBC AUTO: 92 FL (ref 82–98)
MONOCYTES # BLD AUTO: 0.82 THOUSAND/ΜL (ref 0.17–1.22)
MONOCYTES NFR BLD AUTO: 6 % (ref 4–12)
NEUTROPHILS # BLD AUTO: 10.79 THOUSANDS/ΜL (ref 1.85–7.62)
NEUTS SEG NFR BLD AUTO: 80 % (ref 43–75)
NRBC BLD AUTO-RTO: 0 /100 WBCS
NT-PROBNP SERPL-MCNC: 3777 PG/ML
P AXIS: 71 DEGREES
PLATELET # BLD AUTO: 222 THOUSANDS/UL (ref 149–390)
PMV BLD AUTO: 8.9 FL (ref 8.9–12.7)
POTASSIUM SERPL-SCNC: 4.1 MMOL/L (ref 3.5–5.3)
PR INTERVAL: 178 MS
PROT SERPL-MCNC: 6.8 G/DL (ref 6.4–8.4)
QRS AXIS: 115 DEGREES
QRSD INTERVAL: 88 MS
QT INTERVAL: 364 MS
QTC INTERVAL: 455 MS
RBC # BLD AUTO: 3.68 MILLION/UL (ref 3.81–5.12)
SODIUM SERPL-SCNC: 138 MMOL/L (ref 135–147)
T WAVE AXIS: 66 DEGREES
VENTRICULAR RATE: 94 BPM
WBC # BLD AUTO: 13.36 THOUSAND/UL (ref 4.31–10.16)

## 2022-07-29 PROCEDURE — 83880 ASSAY OF NATRIURETIC PEPTIDE: CPT

## 2022-07-29 PROCEDURE — 94664 DEMO&/EVAL PT USE INHALER: CPT

## 2022-07-29 PROCEDURE — 71045 X-RAY EXAM CHEST 1 VIEW: CPT

## 2022-07-29 PROCEDURE — 99233 SBSQ HOSP IP/OBS HIGH 50: CPT | Performed by: PHYSICIAN ASSISTANT

## 2022-07-29 PROCEDURE — 94640 AIRWAY INHALATION TREATMENT: CPT

## 2022-07-29 PROCEDURE — 82948 REAGENT STRIP/BLOOD GLUCOSE: CPT

## 2022-07-29 PROCEDURE — 87040 BLOOD CULTURE FOR BACTERIA: CPT | Performed by: HOSPITALIST

## 2022-07-29 PROCEDURE — 93010 ELECTROCARDIOGRAM REPORT: CPT | Performed by: INTERNAL MEDICINE

## 2022-07-29 PROCEDURE — 80053 COMPREHEN METABOLIC PANEL: CPT | Performed by: HOSPITALIST

## 2022-07-29 PROCEDURE — 85025 COMPLETE CBC W/AUTO DIFF WBC: CPT | Performed by: HOSPITALIST

## 2022-07-29 RX ORDER — FUROSEMIDE 10 MG/ML
20 INJECTION INTRAMUSCULAR; INTRAVENOUS ONCE
Status: COMPLETED | OUTPATIENT
Start: 2022-07-29 | End: 2022-07-29

## 2022-07-29 RX ORDER — LEVALBUTEROL INHALATION SOLUTION 0.63 MG/3ML
0.63 SOLUTION RESPIRATORY (INHALATION) ONCE
Status: COMPLETED | OUTPATIENT
Start: 2022-07-29 | End: 2022-07-29

## 2022-07-29 RX ORDER — ZOLPIDEM TARTRATE 5 MG/1
10 TABLET ORAL
Status: DISCONTINUED | OUTPATIENT
Start: 2022-07-29 | End: 2022-07-31 | Stop reason: HOSPADM

## 2022-07-29 RX ADMIN — ACETAMINOPHEN 650 MG: 325 TABLET ORAL at 12:41

## 2022-07-29 RX ADMIN — ASPIRIN 81 MG: 81 TABLET, COATED ORAL at 08:04

## 2022-07-29 RX ADMIN — Medication 6 MG: at 23:09

## 2022-07-29 RX ADMIN — ACETAMINOPHEN 650 MG: 325 TABLET ORAL at 05:54

## 2022-07-29 RX ADMIN — ZOLPIDEM TARTRATE 10 MG: 5 TABLET ORAL at 23:25

## 2022-07-29 RX ADMIN — HEPARIN SODIUM 5000 UNITS: 5000 INJECTION INTRAVENOUS; SUBCUTANEOUS at 05:55

## 2022-07-29 RX ADMIN — INSULIN LISPRO 3 UNITS: 100 INJECTION, SOLUTION INTRAVENOUS; SUBCUTANEOUS at 08:03

## 2022-07-29 RX ADMIN — ONDANSETRON 4 MG: 2 INJECTION INTRAMUSCULAR; INTRAVENOUS at 18:01

## 2022-07-29 RX ADMIN — HEPARIN SODIUM 5000 UNITS: 5000 INJECTION INTRAVENOUS; SUBCUTANEOUS at 13:26

## 2022-07-29 RX ADMIN — CEFEPIME HYDROCHLORIDE 2000 MG: 2 INJECTION, SOLUTION INTRAVENOUS at 12:30

## 2022-07-29 RX ADMIN — INSULIN LISPRO 2 UNITS: 100 INJECTION, SOLUTION INTRAVENOUS; SUBCUTANEOUS at 17:00

## 2022-07-29 RX ADMIN — ESCITALOPRAM OXALATE 10 MG: 10 TABLET ORAL at 08:04

## 2022-07-29 RX ADMIN — IPRATROPIUM BROMIDE 0.5 MG: 0.5 SOLUTION RESPIRATORY (INHALATION) at 20:47

## 2022-07-29 RX ADMIN — LEVALBUTEROL HYDROCHLORIDE 0.63 MG: 0.63 SOLUTION RESPIRATORY (INHALATION) at 20:47

## 2022-07-29 RX ADMIN — CEFEPIME HYDROCHLORIDE 2000 MG: 2 INJECTION, SOLUTION INTRAVENOUS at 05:17

## 2022-07-29 RX ADMIN — HEPARIN SODIUM 5000 UNITS: 5000 INJECTION INTRAVENOUS; SUBCUTANEOUS at 23:09

## 2022-07-29 RX ADMIN — FUROSEMIDE 20 MG: 10 INJECTION, SOLUTION INTRAMUSCULAR; INTRAVENOUS at 20:49

## 2022-07-29 RX ADMIN — CLOPIDOGREL BISULFATE 75 MG: 75 TABLET ORAL at 08:04

## 2022-07-29 RX ADMIN — ESCITALOPRAM OXALATE 10 MG: 10 TABLET ORAL at 17:00

## 2022-07-29 RX ADMIN — INSULIN LISPRO 1 UNITS: 100 INJECTION, SOLUTION INTRAVENOUS; SUBCUTANEOUS at 23:09

## 2022-07-29 RX ADMIN — ATORVASTATIN CALCIUM 80 MG: 40 TABLET, FILM COATED ORAL at 08:04

## 2022-07-29 RX ADMIN — CEFEPIME HYDROCHLORIDE 2000 MG: 2 INJECTION, SOLUTION INTRAVENOUS at 20:50

## 2022-07-29 RX ADMIN — SODIUM CHLORIDE, SODIUM GLUCONATE, SODIUM ACETATE, POTASSIUM CHLORIDE, MAGNESIUM CHLORIDE, SODIUM PHOSPHATE, DIBASIC, AND POTASSIUM PHOSPHATE 125 ML/HR: .53; .5; .37; .037; .03; .012; .00082 INJECTION, SOLUTION INTRAVENOUS at 11:35

## 2022-07-29 RX ADMIN — SODIUM CHLORIDE, SODIUM GLUCONATE, SODIUM ACETATE, POTASSIUM CHLORIDE, MAGNESIUM CHLORIDE, SODIUM PHOSPHATE, DIBASIC, AND POTASSIUM PHOSPHATE 125 ML/HR: .53; .5; .37; .037; .03; .012; .00082 INJECTION, SOLUTION INTRAVENOUS at 02:33

## 2022-07-29 RX ADMIN — INSULIN LISPRO 4 UNITS: 100 INJECTION, SOLUTION INTRAVENOUS; SUBCUTANEOUS at 12:29

## 2022-07-29 NOTE — ASSESSMENT & PLAN NOTE
Lab Results   Component Value Date    HGBA1C 7 3 (H) 06/03/2022       Recent Labs     07/28/22  2150 07/28/22  2234 07/29/22  0757 07/29/22  1110   POCGLU 249* 245* 235* 280*       Blood Sugar Average: Last 72 hrs:  (P) 686 [id number]     · Holding oral hyperglycemics  · Sliding scale insulin coverage

## 2022-07-29 NOTE — ASSESSMENT & PLAN NOTE
· Nocturnal hypoxia noted however pt mentions h/o sleep apnea   · CXR obtained and reveals atelectasis, continue IS and advise outpatient sleep study if not already on CPAP  · Satting well on RA upon my exam this AM

## 2022-07-29 NOTE — ASSESSMENT & PLAN NOTE
Presents to the ED with nausea, vomiting and abdominal pain  Evidenced by tachycardia, tachypnea, leukocytosis, and lactatemia  · UA: Large leukocytes, + nitrites, innumerable WBC and bacteria  · CT scan revealed moderate left hydronephrosis with extensive gas throughout the left renal pelvis and throughout the left ureter in keeping with an emphysematous UTI  · Started on CTX and broadened to Cefepime, Day#3 of cephalosporin  · Blood cultures x2 with 1/2 pos for GPC in clusters, likely contaminant as UCx growing GNR  · Repeat cultures pending, ensure surveillance cultures remain clear prior to transitioning to PO abx  · Urine culture growing GNR, awaiting speciation and sensitivities  · If remains afebrile and sensitivities return can further narrow abx tomorrow  · S/p PCN by IR  · No obstructing stones seen reviewed with patient, outpatient urology follow    · Decreased IVF rate as pt with robust BP's and more reliable PO fluid intake today  · Monitor fever - PRN Tylenol  · Monitor creatinine - 1 14 on admission however improved to baseline currently   · Baseline 0 8-0 9   · Continue IVF but can decrease rate  · Monitor output from Garcia and PCN

## 2022-07-29 NOTE — ASSESSMENT & PLAN NOTE
Presents to the ED with nausea, vomiting and abdominal pain  Evidenced by tachycardia, tachypnea, leukocytosis, and lactatemia  · UA: Large leukocytes, + nitrites, innumerable WBC and bacteria  · CT scan revealed moderate left hydronephrosis with extensive gas throughout the left renal pelvis and throughout the left ureter in keeping with an emphysematous UTI  · Started on CTX and broadened to Cefepime, Day#3 of cephalosporin  · Blood cultures x2 with 1/2 pos for GPC in clusters, likely contaminant as UCx growing GNR  · Repeat cultures pending, ensure surveillance cultures remain clear prior to transitioning to PO abx  · Urine culture growing pan sensitive E coli  · Narrow abx and observe  · S/p PCN by IR  · No obstructing stones seen reviewed with patient, outpatient urology follow  Monitor fever - PRN Tylenol  · Cap IVF  · Monitor output from Garcia and PCN

## 2022-07-29 NOTE — ASSESSMENT & PLAN NOTE
· Initially held all antihypertensives in setting of fluid resuscitation and sepsis  · Resume  hctz and coreg today    · Home regimen: HCTZ, Coreg and Losartan

## 2022-07-29 NOTE — ASSESSMENT & PLAN NOTE
· Nocturnal hypoxia noted however pt mentions h/o sleep apnea   · CXR obtained and reveals atelectasis, continue IS and advise outpatient sleep study if not already on CPAP  · Satting well on RA upon my exam this AM  · resolved

## 2022-07-29 NOTE — NURSING NOTE
1800 pt retching with temp 99 1  zofran given iv as ordered  Pox 90% on RA and noted to have exp & audible wheezes  javier AP notified and orders obtained  ivf stopped

## 2022-07-29 NOTE — PROGRESS NOTES
[place]  Progress Note - Jacquiline Mew 1965, 64 y o  female MRN: [id number]  Unit/Bed#: -01 SDU Encounter: [id number]  Primary Care Provider: JEANCARLOS Artis   Date and time admitted to hospital: 7/27/2022  3:29 PM    Hypoxia  Assessment & Plan  · Nocturnal hypoxia noted however pt mentions h/o sleep apnea   · CXR obtained and reveals atelectasis, continue IS and advise outpatient sleep study if not already on CPAP  · Satting well on RA upon my exam this AM    Pyelitis  Assessment & Plan  · Plan as noted in sepsis  · Monitor PCN output  · No noted emphysematous drainage from PCN or etienne     Depression, recurrent (HCC)  Assessment & Plan  · Continue Lexapro    BMI 36 0-36 9,adult  Assessment & Plan  · Encouraged diet modifications    Type 2 diabetes mellitus, without long-term current use of insulin [facility])  Assessment & Plan  Lab Results   Component Value Date    HGBA1C 7 3 (H) 06/03/2022       Recent Labs     07/28/22  2150 07/28/22  2234 07/29/22  0757 07/29/22  1110   POCGLU 249* 245* 235* 280*       Blood Sugar Average: Last 72 hrs:  (P) 244 [id number]     · Holding oral hyperglycemics  · Sliding scale insulin coverage    Hyperlipidemia  Assessment & Plan  · Continue statin    Hypertension  Assessment & Plan  · Initially held all antihypertensives in setting of fluid resuscitation and sepsis  · Resume as necessary, BPs currently acceptable  · Home regimen: HCTZ, Coreg and Losartan     Coronary artery disease involving coronary bypass graft of native heart without angina pectoris  Assessment & Plan  · Maintain on aspirin and Plavix, continued post procedure    * Sepsis [facility])  Assessment & Plan  Presents to the ED with nausea, vomiting and abdominal pain     Evidenced by tachycardia, tachypnea, leukocytosis, and lactatemia  · UA: Large leukocytes, + nitrites, innumerable WBC and bacteria  · CT scan revealed moderate left hydronephrosis with extensive gas throughout the left renal pelvis and throughout the left ureter in keeping with an emphysematous UTI  · Started on CTX and broadened to Cefepime, Day#3 of cephalosporin  · Blood cultures x2 with 1/2 pos for GPC in clusters, likely contaminant as UCx growing GNR  · Repeat cultures pending, ensure surveillance cultures remain clear prior to transitioning to PO abx  · Urine culture growing GNR, awaiting speciation and sensitivities  · If remains afebrile and sensitivities return can further narrow abx tomorrow  · S/p PCN by IR  · No obstructing stones seen reviewed with patient, outpatient urology follow  · Decreased IVF rate as pt with robust BP's and more reliable PO fluid intake today  · Monitor fever - PRN Tylenol  · Monitor creatinine - 1 14 on admission however improved to baseline currently   · Baseline 0 8-0 9   · Continue IVF but can decrease rate  · Monitor output from Garcia and PCN     VTE Pharmacologic Prophylaxis:   Pharmacologic: Heparin  Mechanical VTE Prophylaxis in Place: Yes    Patient Centered Rounds: I have performed bedside rounds with nursing staff today  Discussions with Specialists or Other Care Team Provider: discussed case with nursing, case management     Education and Discussions with Family / Patient: discussed plan with pt who declined any family update    Time Spent for Care: 30 minutes  More than 50% of total time spent on counseling and coordination of care as described above      Current Length of Stay: 2 day(s)    Current Patient Status: Inpatient   Certification Statement: The patient will continue to require additional inpatient hospital stay due to awaiting urine culture speciation to further narrow antibitoic coverage and dc IV abx/IVF    Discharge Plan: likely 24-48 hours if remains afebrile and if urinary organism is pansensitive    Code Status: Level 1 - Full Code      Subjective:   Patient is a bit tired today as she had night sweats and difficulty sleeping, subsequently developed a headache but otherwise says fever and chills have subsided  Denies flank pain  Denies chest pain or trouble breathing  She does say she had a couple episodes of retching overnight but that her nausea has improved this morning and she was able to tolerate breakfast   She still has minimal appetite but this is improving as well  Endorses 1 episode of diarrhea overnight  Objective:     Vitals:   Temp (24hrs), Av 8 °F (37 1 °C), Min:98 3 °F (36 8 °C), Max:99 5 °F (37 5 °C)    Temp:  [98 3 °F (36 8 °C)-99 5 °F (37 5 °C)] 98 5 °F (36 9 °C)  HR:  [68-87] 75  Resp:  [20-35] 31  BP: (103-144)/(57-80) 144/77  SpO2:  [88 %-97 %] 93 %  Body mass index is 36 99 kg/m²  Input and Output Summary (last 24 hours): Intake/Output Summary (Last 24 hours) at 2022 1450  Last data filed at 2022 1322  Gross per 24 hour   Intake 4464 18 ml   Output 2035 ml   Net 2429 18 ml       Physical Exam:     Physical Exam  Vitals and nursing note reviewed  Constitutional:       General: She is not in acute distress  Appearance: Normal appearance  She is normal weight  She is not toxic-appearing  HENT:      Head: Normocephalic and atraumatic  Right Ear: External ear normal       Left Ear: External ear normal       Nose: Nose normal       Mouth/Throat:      Mouth: Mucous membranes are moist       Pharynx: Oropharynx is clear  No oropharyngeal exudate  Eyes:      General: No scleral icterus  Conjunctiva/sclera: Conjunctivae normal    Cardiovascular:      Rate and Rhythm: Normal rate and regular rhythm  Pulses: Normal pulses  Pulmonary:      Effort: Pulmonary effort is normal  No respiratory distress  Breath sounds: Normal breath sounds  No stridor  No wheezing or rales  Abdominal:      General: Abdomen is flat  Bowel sounds are normal  There is no distension  Tenderness: There is no abdominal tenderness  There is no right CVA tenderness or left CVA tenderness  Musculoskeletal:         General: Normal range of motion  Cervical back: Normal range of motion  Right lower leg: No edema  Left lower leg: No edema  Skin:     General: Skin is warm and dry  Capillary Refill: Capillary refill takes less than 2 seconds  Coloration: Skin is not jaundiced or pale  Neurological:      General: No focal deficit present  Mental Status: She is alert  Mental status is at baseline  Sensory: No sensory deficit  Motor: No weakness  Psychiatric:         Mood and Affect: Mood normal          Thought Content: Thought content normal          Additional Data:     Labs:    Results from last 7 days   Lab Units 07/29/22  0610   WBC Thousand/uL 13 36*   HEMOGLOBIN g/dL 10 6*   HEMATOCRIT % 33 7*   PLATELETS Thousands/uL 222   NEUTROS PCT % 80*   LYMPHS PCT % 12*   MONOS PCT % 6   EOS PCT % 1     Results from last 7 days   Lab Units 07/29/22  0610   POTASSIUM mmol/L 4 1   CHLORIDE mmol/L 103   CO2 mmol/L 27   BUN mg/dL 10   CREATININE mg/dL 0 90   CALCIUM mg/dL 7 9*   ALK PHOS U/L 76   ALT U/L 27   AST U/L 15     Results from last 7 days   Lab Units 07/27/22  1548   INR  0 95       * I Have Reviewed All Lab Data Listed Above  * Additional Pertinent Lab Tests Reviewed: Clint 66 Admission Reviewed    Imaging:    Imaging Reports Reviewed Today Include: CTAP  Imaging Personally Reviewed by Myself Includes:  none    Recent Cultures (last 7 days):     Results from last 7 days   Lab Units 07/29/22  0644 07/29/22  0610 07/27/22  2053 07/27/22  1605 07/27/22  1548   BLOOD CULTURE  Received in Microbiology Lab  Culture in Progress  Received in Microbiology Lab  Culture in Progress  --   --  Staphylococcus coagulase negative*  No Growth at 24 hrs  GRAM STAIN RESULT   --   --   --   --  Gram positive cocci in clusters*   URINE CULTURE   --   --  Culture results to follow   >100,000 cfu/ml Gram Negative Reji Enteric Like*  --        Last 24 Hours Medication List:   Current Facility-Administered Medications   Medication Dose Route Frequency Provider Last Rate    acetaminophen  650 mg Oral Q6H PRN JEANCARLOS Melgar      aspirin  81 mg Oral Daily JEANCARLOS Alberto      atorvastatin  80 mg Oral Daily JEANCARLOS Alberto      cefepime  2,000 mg Intravenous Q8H Stan Harvey MD Stopped (07/29/22 1322)    clopidogrel  75 mg Oral Daily JEANCARLOS Melgar      escitalopram  10 mg Oral BID JEANCARLOS Melgar      heparin (porcine)  5,000 Units Subcutaneous Q8H [facility] JEANCARLOS Crockett      insulin lispro  1-5 Units Subcutaneous HS JEANCARLOS Alfred      insulin lispro  1-6 Units Subcutaneous TID AC JEANCARLOS Alfred      melatonin  6 mg Oral HS JEANCARLOS Alberto      multi-electrolyte  100 mL/hr Intravenous Continuous Lydia Rubio PA-C 100 mL/hr (07/29/22 1435)    ondansetron  4 mg Intravenous Q6H PRN JEANCARLOS Melgar      senna-docusate sodium  2 tablet Oral BID JEANCARLOS Melgar      zolpidem  10 mg Oral HS PRN Lydia Rubio PA-C          Today, Patient Was Seen By: Lydia Rubio PA-C    ** Please Note: Dictation voice to text software may have been used in the creation of this document   **

## 2022-07-29 NOTE — PLAN OF CARE
Problem: Potential for Falls  Goal: Patient will remain free of falls  Description: INTERVENTIONS:  - Educate patient/family on patient safety including physical limitations  - Instruct patient to call for assistance with activity   - Consult OT/PT to assist with strengthening/mobility   - Keep Call bell within reach  - Keep bed low and locked with side rails adjusted as appropriate  - Keep care items and personal belongings within reach  - Initiate and maintain comfort rounds  - Make Fall Risk Sign visible to staff  - Apply yellow socks and bracelet for high fall risk patients  - Consider moving patient to room near nurses station  Outcome: Progressing     Problem: MOBILITY - ADULT  Goal: Maintain or return to baseline ADL function  Description: INTERVENTIONS:  -  Assess patient's ability to carry out ADLs; assess patient's baseline for ADL function and identify physical deficits which impact ability to perform ADLs (bathing, care of mouth/teeth, toileting, grooming, dressing, etc )  - Assess/evaluate cause of self-care deficits   - Assess range of motion  - Assess patient's mobility; develop plan if impaired  - Assess patient's need for assistive devices and provide as appropriate  - Encourage maximum independence but intervene and supervise when necessary  - Involve family in performance of ADLs  - Assess for home care needs following discharge   - Consider OT consult to assist with ADL evaluation and planning for discharge  - Provide patient education as appropriate  Outcome: Progressing  Goal: Maintains/Returns to pre admission functional level  Description: INTERVENTIONS:  - Perform BMAT or MOVE assessment daily    - Set and communicate daily mobility goal to care team and patient/family/caregiver     - Collaborate with rehabilitation services on mobility goals if consulted  - Out of bed for toileting  - Record patient progress and toleration of activity level   Outcome: Progressing     Problem: PAIN - ADULT  Goal: Verbalizes/displays adequate comfort level or baseline comfort level  Description: Interventions:  - Encourage patient to monitor pain and request assistance  - Assess pain using appropriate pain scale  - Administer analgesics based on type and severity of pain and evaluate response  - Implement non-pharmacological measures as appropriate and evaluate response  - Consider cultural and social influences on pain and pain management  - Notify physician/advanced practitioner if interventions unsuccessful or patient reports new pain  Outcome: Progressing     Problem: INFECTION - ADULT  Goal: Absence or prevention of progression during hospitalization  Description: INTERVENTIONS:  - Assess and monitor for signs and symptoms of infection  - Monitor lab/diagnostic results  - Monitor all insertion sites, i e  indwelling lines, tubes, and drains  - Monitor endotracheal if appropriate and nasal secretions for changes in amount and color  - [place] appropriate cooling/warming therapies per order  - Administer medications as ordered  - Instruct and encourage patient and family to use good hand hygiene technique  - Identify and instruct in appropriate isolation precautions for identified infection/condition  Outcome: Progressing  Goal: Absence of fever/infection during neutropenic period  Description: INTERVENTIONS:  - Monitor WBC    Outcome: Progressing     Problem: SAFETY ADULT  Goal: Patient will remain free of falls  Description: INTERVENTIONS:  - Educate patient/family on patient safety including physical limitations  - Instruct patient to call for assistance with activity   - Consult OT/PT to assist with strengthening/mobility   - Keep Call bell within reach  - Keep bed low and locked with side rails adjusted as appropriate  - Keep care items and personal belongings within reach  - Initiate and maintain comfort rounds  - Make Fall Risk Sign visible to staff  - Apply yellow socks and bracelet for high fall risk patients  - Consider moving patient to room near nurses station  Outcome: Progressing  Goal: Maintain or return to baseline ADL function  Description: INTERVENTIONS:  -  Assess patient's ability to carry out ADLs; assess patient's baseline for ADL function and identify physical deficits which impact ability to perform ADLs (bathing, care of mouth/teeth, toileting, grooming, dressing, etc )  - Assess/evaluate cause of self-care deficits   - Assess range of motion  - Assess patient's mobility; develop plan if impaired  - Assess patient's need for assistive devices and provide as appropriate  - Encourage maximum independence but intervene and supervise when necessary  - Involve family in performance of ADLs  - Assess for home care needs following discharge   - Consider OT consult to assist with ADL evaluation and planning for discharge  - Provide patient education as appropriate  Outcome: Progressing  Goal: Maintains/Returns to pre admission functional level  Description: INTERVENTIONS:  - Perform BMAT or MOVE assessment daily    - Set and communicate daily mobility goal to care team and patient/family/caregiver     - Collaborate with rehabilitation services on mobility goals if consulted  - Out of bed for toileting  - Record patient progress and toleration of activity level   Outcome: Progressing     Problem: DISCHARGE PLANNING  Goal: Discharge to home or other facility with appropriate resources  Description: INTERVENTIONS:  - Identify barriers to discharge w/patient and caregiver  - Arrange for needed discharge resources and transportation as appropriate  - Identify discharge learning needs (meds, wound care, etc )  - Arrange for interpretive services to assist at discharge as needed  - Refer to Case Management Department for coordinating discharge planning if the patient needs post-hospital services based on physician/advanced practitioner order or complex needs related to functional status, cognitive ability, or social support system  Outcome: Progressing     Problem: Knowledge Deficit  Goal: Patient/family/caregiver demonstrates understanding of disease process, treatment plan, medications, and discharge instructions  Description: Complete learning assessment and assess knowledge base    Interventions:  - Provide teaching at level of understanding  - Provide teaching via preferred learning methods  Outcome: Progressing     Problem: Prexisting or High Potential for Compromised Skin Integrity  Goal: Skin integrity is maintained or improved  Description: INTERVENTIONS:  - Identify patients at risk for skin breakdown  - Assess and monitor skin integrity  - Assess and monitor nutrition and hydration status  - Monitor labs   - Assess for incontinence   - Turn and reposition patient  - Assist with mobility/ambulation  - Relieve pressure over bony prominences  - Avoid friction and shearing  - Provide appropriate hygiene as needed including keeping skin clean and dry  - Evaluate need for skin moisturizer/barrier cream  - Collaborate with interdisciplinary team   - Patient/family teaching  - Consider wound care consult   Outcome: Progressing

## 2022-07-29 NOTE — ASSESSMENT & PLAN NOTE
Lab Results   Component Value Date    HGBA1C 7 3 (H) 06/03/2022       Recent Labs     07/28/22  1115 07/28/22  1627 07/28/22  2150 07/28/22  2234   POCGLU 241* 187* 249* 245*       Blood Sugar Average: Last 72 hrs:  (P) 241 [id number]     · Holding oral hyperglycemics  · Sliding scale insulin coverage

## 2022-07-30 LAB
ALBUMIN SERPL BCP-MCNC: 3 G/DL (ref 3.5–5)
ALP SERPL-CCNC: 87 U/L (ref 46–116)
ALT SERPL W P-5'-P-CCNC: 33 U/L (ref 12–78)
ANION GAP SERPL CALCULATED.3IONS-SCNC: 8 MMOL/L (ref 4–13)
AST SERPL W P-5'-P-CCNC: 20 U/L (ref 5–45)
BACTERIA BLD CULT: ABNORMAL
BACTERIA UR CULT: ABNORMAL
BASOPHILS # BLD AUTO: 0.03 THOUSANDS/ΜL (ref 0–0.1)
BASOPHILS NFR BLD AUTO: 0 % (ref 0–1)
BILIRUB SERPL-MCNC: 0.6 MG/DL (ref 0.2–1)
BUN SERPL-MCNC: 9 MG/DL (ref 5–25)
CALCIUM ALBUM COR SERPL-MCNC: 9.7 MG/DL (ref 8.3–10.1)
CALCIUM SERPL-MCNC: 8.9 MG/DL (ref 8.3–10.1)
CHLORIDE SERPL-SCNC: 101 MMOL/L (ref 96–108)
CO2 SERPL-SCNC: 28 MMOL/L (ref 21–32)
CREAT SERPL-MCNC: 0.89 MG/DL (ref 0.6–1.3)
EOSINOPHIL # BLD AUTO: 0.09 THOUSAND/ΜL (ref 0–0.61)
EOSINOPHIL NFR BLD AUTO: 1 % (ref 0–6)
ERYTHROCYTE [DISTWIDTH] IN BLOOD BY AUTOMATED COUNT: 12.2 % (ref 11.6–15.1)
GFR SERPL CREATININE-BSD FRML MDRD: 72 ML/MIN/1.73SQ M
GLUCOSE SERPL-MCNC: 162 MG/DL (ref 65–140)
GLUCOSE SERPL-MCNC: 185 MG/DL (ref 65–140)
GLUCOSE SERPL-MCNC: 189 MG/DL (ref 65–140)
GLUCOSE SERPL-MCNC: 200 MG/DL (ref 65–140)
GLUCOSE SERPL-MCNC: 238 MG/DL (ref 65–140)
GRAM STN SPEC: ABNORMAL
HCT VFR BLD AUTO: 35.3 % (ref 34.8–46.1)
HGB BLD-MCNC: 11.4 G/DL (ref 11.5–15.4)
IMM GRANULOCYTES # BLD AUTO: 0.07 THOUSAND/UL (ref 0–0.2)
IMM GRANULOCYTES NFR BLD AUTO: 1 % (ref 0–2)
LYMPHOCYTES # BLD AUTO: 1.51 THOUSANDS/ΜL (ref 0.6–4.47)
LYMPHOCYTES NFR BLD AUTO: 12 % (ref 14–44)
MCH RBC QN AUTO: 30 PG (ref 26.8–34.3)
MCHC RBC AUTO-ENTMCNC: 32.3 G/DL (ref 31.4–37.4)
MCV RBC AUTO: 93 FL (ref 82–98)
MONOCYTES # BLD AUTO: 0.73 THOUSAND/ΜL (ref 0.17–1.22)
MONOCYTES NFR BLD AUTO: 6 % (ref 4–12)
NEUTROPHILS # BLD AUTO: 10.01 THOUSANDS/ΜL (ref 1.85–7.62)
NEUTS SEG NFR BLD AUTO: 80 % (ref 43–75)
NRBC BLD AUTO-RTO: 0 /100 WBCS
PLATELET # BLD AUTO: 230 THOUSANDS/UL (ref 149–390)
PMV BLD AUTO: 9.8 FL (ref 8.9–12.7)
POTASSIUM SERPL-SCNC: 3.7 MMOL/L (ref 3.5–5.3)
PROT SERPL-MCNC: 7.3 G/DL (ref 6.4–8.4)
RBC # BLD AUTO: 3.8 MILLION/UL (ref 3.81–5.12)
S AUREUS+CONS DNA BLD POS NAA+NON-PROBE: DETECTED
SODIUM SERPL-SCNC: 137 MMOL/L (ref 135–147)
WBC # BLD AUTO: 12.44 THOUSAND/UL (ref 4.31–10.16)

## 2022-07-30 PROCEDURE — 82948 REAGENT STRIP/BLOOD GLUCOSE: CPT

## 2022-07-30 PROCEDURE — 85025 COMPLETE CBC W/AUTO DIFF WBC: CPT | Performed by: INTERNAL MEDICINE

## 2022-07-30 PROCEDURE — 80053 COMPREHEN METABOLIC PANEL: CPT | Performed by: INTERNAL MEDICINE

## 2022-07-30 PROCEDURE — 99232 SBSQ HOSP IP/OBS MODERATE 35: CPT | Performed by: HOSPITALIST

## 2022-07-30 RX ORDER — CIPROFLOXACIN 500 MG/1
500 TABLET, FILM COATED ORAL EVERY 12 HOURS SCHEDULED
Status: DISCONTINUED | OUTPATIENT
Start: 2022-07-30 | End: 2022-07-31 | Stop reason: HOSPADM

## 2022-07-30 RX ORDER — CARVEDILOL 3.12 MG/1
6.25 TABLET ORAL 2 TIMES DAILY WITH MEALS
Status: DISCONTINUED | OUTPATIENT
Start: 2022-07-30 | End: 2022-07-31 | Stop reason: HOSPADM

## 2022-07-30 RX ORDER — HYDROCHLOROTHIAZIDE 12.5 MG/1
12.5 TABLET ORAL DAILY
Status: DISCONTINUED | OUTPATIENT
Start: 2022-07-30 | End: 2022-07-31 | Stop reason: HOSPADM

## 2022-07-30 RX ADMIN — INSULIN LISPRO 2 UNITS: 100 INJECTION, SOLUTION INTRAVENOUS; SUBCUTANEOUS at 17:30

## 2022-07-30 RX ADMIN — CIPROFLOXACIN HYDROCHLORIDE 500 MG: 500 TABLET, FILM COATED ORAL at 09:24

## 2022-07-30 RX ADMIN — CARVEDILOL 6.25 MG: 3.12 TABLET, FILM COATED ORAL at 17:29

## 2022-07-30 RX ADMIN — HEPARIN SODIUM 5000 UNITS: 5000 INJECTION INTRAVENOUS; SUBCUTANEOUS at 06:05

## 2022-07-30 RX ADMIN — HYDROCHLOROTHIAZIDE 12.5 MG: 12.5 TABLET ORAL at 09:23

## 2022-07-30 RX ADMIN — ACETAMINOPHEN 650 MG: 325 TABLET ORAL at 12:32

## 2022-07-30 RX ADMIN — CARVEDILOL 6.25 MG: 3.12 TABLET, FILM COATED ORAL at 09:24

## 2022-07-30 RX ADMIN — Medication 6 MG: at 20:52

## 2022-07-30 RX ADMIN — INSULIN LISPRO 1 UNITS: 100 INJECTION, SOLUTION INTRAVENOUS; SUBCUTANEOUS at 22:34

## 2022-07-30 RX ADMIN — ATORVASTATIN CALCIUM 80 MG: 40 TABLET, FILM COATED ORAL at 08:22

## 2022-07-30 RX ADMIN — INSULIN LISPRO 1 UNITS: 100 INJECTION, SOLUTION INTRAVENOUS; SUBCUTANEOUS at 11:39

## 2022-07-30 RX ADMIN — CEFEPIME HYDROCHLORIDE 2000 MG: 2 INJECTION, SOLUTION INTRAVENOUS at 05:01

## 2022-07-30 RX ADMIN — ZOLPIDEM TARTRATE 10 MG: 5 TABLET ORAL at 20:57

## 2022-07-30 RX ADMIN — ESCITALOPRAM OXALATE 10 MG: 10 TABLET ORAL at 17:30

## 2022-07-30 RX ADMIN — ASPIRIN 81 MG: 81 TABLET, COATED ORAL at 08:22

## 2022-07-30 RX ADMIN — ESCITALOPRAM OXALATE 10 MG: 10 TABLET ORAL at 08:21

## 2022-07-30 RX ADMIN — CIPROFLOXACIN HYDROCHLORIDE 500 MG: 500 TABLET, FILM COATED ORAL at 20:52

## 2022-07-30 RX ADMIN — HEPARIN SODIUM 5000 UNITS: 5000 INJECTION INTRAVENOUS; SUBCUTANEOUS at 20:52

## 2022-07-30 RX ADMIN — CLOPIDOGREL BISULFATE 75 MG: 75 TABLET ORAL at 08:22

## 2022-07-30 RX ADMIN — INSULIN LISPRO 3 UNITS: 100 INJECTION, SOLUTION INTRAVENOUS; SUBCUTANEOUS at 08:24

## 2022-07-30 RX ADMIN — HEPARIN SODIUM 5000 UNITS: 5000 INJECTION INTRAVENOUS; SUBCUTANEOUS at 15:12

## 2022-07-30 NOTE — RESPIRATORY THERAPY NOTE
RT Protocol Note  Moriah Barba 64 y o  female MRN: [id number]  Unit/Bed#: -01 SDU Encounter: [id number]    Assessment    Principal Problem:    Sepsis [facility])  Active Problems:    Coronary artery disease involving coronary bypass graft of native heart without angina pectoris    Hypertension    Hyperlipidemia    Type 2 diabetes mellitus, without long-term current use of insulin ([facility])    BMI 36 0-36 9,adult    Depression, recurrent ([place] [address] )    Pyelitis    Hypoxia      Home Pulmonary Medications; None       Past Medical History:   Diagnosis Date    Circulation problem     Coronary artery disease     Depression     Diabetes ([place] [address] )     Dyspnea on exertion 2019    Glaucoma     Hearing problem     Heart attack ([place] [address] )     Heart failure ([facility])     Heart valve problem     High blood pressure     High cholesterol     Obesity, morbid ([place] [address] ) 2021    Open injury of heart     Sleep apnea      Social History     Socioeconomic History    Marital status:      Spouse name: None    Number of children: None    Years of education: None    Highest education level: None   Occupational History    None   Tobacco Use    Smoking status: Former Smoker     Types: Cigarettes     Quit date:      Years since quittin 5    Smokeless tobacco: Never Used   Vaping Use    Vaping Use: Never used   Substance and Sexual Activity    Alcohol use: Yes     Comment: social    Drug use: Yes     Types: Marijuana    Sexual activity: None   Other Topics Concern    None   Social History Narrative    None     Social Determinants of Health     Financial Resource Strain: Not on file   Food Insecurity: No Food Insecurity    Worried About Running Out of Food in the Last Year: Never true    Ran Out of Food in the Last Year: Never true   Transportation Needs: No Transportation Needs    Lack of Transportation (Medical): No    Lack of Transportation (Non-Medical):  No   Physical Activity: Not on file   Stress: Not on file   Social Connections: Not on file   Intimate Partner Violence: Not on file   Housing Stability: Low Risk     Unable to Pay for Housing in the Last Year: No    Number of Places Lived in the Last Year: 1    Unstable Housing in the Last Year: No       Subjective         Objective    Physical Exam:   Assessment Type: During-treatment  General Appearance: Alert, Awake  Respiratory Pattern: Normal  Chest Assessment: Chest expansion symmetrical, Trachea midline  Bilateral Breath Sounds: Clear, Diminished  Cough: None    Vitals:  Blood pressure 161/70, pulse 80, temperature 99 6 °F (37 6 °C), temperature source Oral, resp  rate 18, height 5' 1" (1 549 m), weight 88 8 kg (195 lb 12 3 oz), SpO2 97 %  Imaging and other studies: I have personally reviewed pertinent reports  Plan    Respiratory Plan: No distress/Pulmonary history  No further Resp  Therapy needed at this time

## 2022-07-30 NOTE — PROGRESS NOTES
[place]  Progress Note - Devi Chun 1965, 64 y o  female MRN: [id number]  Unit/Bed#: -01 Encounter: [id number]  Primary Care Provider: JEANCARLOS Russ   Date and time admitted to hospital: 7/27/2022  3:29 PM    Hypoxia  Assessment & Plan  · Nocturnal hypoxia noted however pt mentions h/o sleep apnea   · CXR obtained and reveals atelectasis, continue IS and advise outpatient sleep study if not already on CPAP  · Satting well on RA upon my exam this AM  · resolved    Pyelitis  Assessment & Plan  · Plan as noted in sepsis  · Monitor PCN output  · No noted emphysematous drainage from PCN or etienne     Depression, recurrent (HCC)  Assessment & Plan  · Continue Lexapro    BMI 36 0-36 9,adult  Assessment & Plan  · Encouraged diet modifications    Type 2 diabetes mellitus, without long-term current use of insulin [facility])  Assessment & Plan  Lab Results   Component Value Date    HGBA1C 7 3 (H) 06/03/2022       Recent Labs     07/28/22  2150 07/28/22  2234 07/29/22  0757 07/29/22  1110   POCGLU 249* 245* 235* 280*       Blood Sugar Average: Last 72 hrs:  (P) 244 [id number]     · Holding oral hyperglycemics  · Sliding scale insulin coverage    Hyperlipidemia  Assessment & Plan  · Continue statin    Hypertension  Assessment & Plan  · Initially held all antihypertensives in setting of fluid resuscitation and sepsis  · Resume  hctz and coreg today  · Home regimen: HCTZ, Coreg and Losartan     Coronary artery disease involving coronary bypass graft of native heart without angina pectoris  Assessment & Plan  · Maintain on aspirin and Plavix, continued post procedure    * Sepsis [facility])  Assessment & Plan  Presents to the ED with nausea, vomiting and abdominal pain     Evidenced by tachycardia, tachypnea, leukocytosis, and lactatemia  · UA: Large leukocytes, + nitrites, innumerable WBC and bacteria  · CT scan revealed moderate left hydronephrosis with extensive gas throughout the left renal pelvis and throughout the left ureter in keeping with an emphysematous UTI  · Started on CTX and broadened to Cefepime, Day#3 of cephalosporin  · Blood cultures x2 with 1/2 pos for GPC in clusters, likely contaminant as UCx growing GNR  · Repeat cultures pending, ensure surveillance cultures remain clear prior to transitioning to PO abx  · Urine culture growing pan sensitive E coli  · Narrow abx and observe  · S/p PCN by IR  · No obstructing stones seen reviewed with patient, outpatient urology follow  Monitor fever - PRN Tylenol  · Cap IVF  · Monitor output from Etienne and PCN          VTE  Prophylaxis:   Pharmacologic: in place    Patient Centered Rounds: I have performed bedside rounds with nursing staff today  Discussions with Specialists or Other Care Team Provider: case management    Education and Discussions with Family / Patient: pt      Current Length of Stay: 3 day(s)    Current Patient Status: Inpatient        Code Status: Level 1 - Full Code      Subjective:   Pt seen  Afebrile  Blood pressures trending upwards  States feeling better    Patient is seen and examined at bedside  All other ROS are negative  Objective:     Vitals:   Temp (24hrs), Av °F (37 2 °C), Min:98 1 °F (36 7 °C), Max:99 6 °F (37 6 °C)    Temp:  [98 1 °F (36 7 °C)-99 6 °F (37 6 °C)] 99 5 °F (37 5 °C)  HR:  [73-86] 74  Resp:  [18-31] 18  BP: (132-185)/(69-80) 132/80  SpO2:  [90 %-97 %] 90 %  Body mass index is 36 05 kg/m²  Input and Output Summary (last 24 hours):        Intake/Output Summary (Last 24 hours) at 2022 0852  Last data filed at 2022 0801  Gross per 24 hour   Intake 1929 59 ml   Output 3785 ml   Net -1855 41 ml       Physical Exam:       GEN: No acute distress, comfortable  HEEENT: No JVD, PERRLA, no scleral icterus  RESP: Lungs clear to auscultation bilaterally  CV: RRR, +s1/s2   ABD: SOFT NON TENDER, POSITIVE BOWEL SOUNDS, NO DISTENTION  PSYCH: CALM  gu : pcn, etienne  NEURO: A X O X 3, NO FOCAL DEFICITS  SKIN: NO RASH  EXTREM: NO EDEMA    Additional Data:     Labs:    Results from last 7 days   Lab Units 07/30/22  0453 07/28/22  0507 07/27/22  2204   WBC Thousand/uL 12 44*   < > 12 76*   HEMOGLOBIN g/dL 11 4*   < > 11 5   HEMATOCRIT % 35 3   < > 34 5*   PLATELETS Thousands/uL 230   < > 223  223   BANDS PCT %  --   --  16*   NEUTROS PCT % 80*   < >  --    LYMPHS PCT % 12*   < >  --    LYMPHO PCT %  --   --  4*   MONOS PCT % 6   < >  --    MONO PCT %  --   --  3*   EOS PCT % 1   < > 0    < > = values in this interval not displayed  Results from last 7 days   Lab Units 07/30/22  0453   SODIUM mmol/L 137   POTASSIUM mmol/L 3 7   CHLORIDE mmol/L 101   CO2 mmol/L 28   BUN mg/dL 9   CREATININE mg/dL 0 89   ANION GAP mmol/L 8   CALCIUM mg/dL 8 9   ALBUMIN g/dL 3 0*   TOTAL BILIRUBIN mg/dL 0 60   ALK PHOS U/L 87   ALT U/L 33   AST U/L 20   GLUCOSE RANDOM mg/dL 189*     Results from last 7 days   Lab Units 07/27/22  1548   INR  0 95     Results from last 7 days   Lab Units 07/30/22  0719 07/29/22  2303 07/29/22  1608 07/29/22  1110 07/29/22  0757 07/28/22  2234 07/28/22  2150 07/28/22  1627 07/28/22  1115 07/28/22  0754 07/27/22  2127 07/27/22  1559   POC GLUCOSE mg/dl 238* 209* 194* 280* 235* 245* 249* 187* 241* 264* 196* 307*         Results from last 7 days   Lab Units 07/28/22  0507 07/28/22  0006 07/27/22  2204 07/27/22  1922 07/27/22  1548 07/27/22  1458   LACTIC ACID mmol/L  --  1 8 2 8* 3 5* 2 7*  --    PROCALCITONIN ng/ml 49 96*  --   --   --   --  0 34*           * I Have Reviewed All Lab Data Listed Above  Imaging:     No results found for this or any previous visit  No results found for this or any previous visit  *I have reviewed all imaging reports listed above      Recent Cultures (last 7 days):     Results from last 7 days   Lab Units 07/29/22  0644 07/29/22  0610 07/27/22  2053 07/27/22  1605 07/27/22  1548   BLOOD CULTURE  Received in Microbiology Lab   Culture in Progress  Received in Microbiology Lab  Culture in Progress  --   --  No Growth at 48 hrs  Staphylococcus coagulase negative*   GRAM STAIN RESULT   --   --   --   --  Gram positive cocci in clusters*   URINE CULTURE   --   --  40,000-49,000 cfu/ml Escherichia coli* >100,000 cfu/ml Gram Negative Reji Enteric Like*  --        Last 24 Hours Medication List:   Current Facility-Administered Medications   Medication Dose Route Frequency Provider Last Rate    acetaminophen  650 mg Oral Q6H PRN Joce Morning, PA-C      aspirin  81 mg Oral Daily Joce Morning, PA-C      atorvastatin  80 mg Oral Daily Joce Morning, PA-C      carvedilol  6 25 mg Oral BID With Meals Manas Browne MD      ciprofloxacin  500 mg Oral Q12H 215 Erin Carmen MD      clopidogrel  75 mg Oral Daily Joce Morning, PA-ANTHONY      escitalopram  10 mg Oral BID Joce Morning, PA-C      heparin (porcine)  5,000 Units Subcutaneous [facility] Mauro Morning, PA-C      hydrochlorothiazide  12 5 mg Oral Daily Wilfredo Rivero MD      insulin lispro  1-5 Units Subcutaneous HS Joce Morning, PA-C      insulin lispro  1-6 Units Subcutaneous TID AC Raisa Blanco, PA-ANTHONY      melatonin  6 mg Oral HS Raisa Blanco, PA-ANTHONY      ondansetron  4 mg Intravenous Q6H PRN Joce Morning, PA-C      senna-docusate sodium  2 tablet Oral BID Joce Morning, PA-C      zolpidem  10 mg Oral HS PRN Joce Morning, PA-ANTHONY          Today, Patient Was Seen By: Manas Browne MD    ** Please Note: Dictation voice to text software may have been used in the creation of this document   **

## 2022-07-30 NOTE — PLAN OF CARE
Problem: Potential for Falls  Goal: Patient will remain free of falls  Description: INTERVENTIONS:  - Educate patient/family on patient safety including physical limitations  - Instruct patient to call for assistance with activity   - Consult OT/PT to assist with strengthening/mobility   - Keep Call bell within reach  - Keep bed low and locked with side rails adjusted as appropriate  - Keep care items and personal belongings within reach  - Initiate and maintain comfort rounds  - Make Fall Risk Sign visible to staff  - Offer Toileting every 2 Hours, in advance of need  - Initiate/Maintain bed alarm  - Obtain necessary fall risk management equipment:   - Apply yellow socks and bracelet for high fall risk patients  - Consider moving patient to room near nurses station  Outcome: Progressing     Problem: MOBILITY - ADULT  Goal: Maintain or return to baseline ADL function  Description: INTERVENTIONS:  -  Assess patient's ability to carry out ADLs; assess patient's baseline for ADL function and identify physical deficits which impact ability to perform ADLs (bathing, care of mouth/teeth, toileting, grooming, dressing, etc )  - Assess/evaluate cause of self-care deficits   - Assess range of motion  - Assess patient's mobility; develop plan if impaired  - Assess patient's need for assistive devices and provide as appropriate  - Encourage maximum independence but intervene and supervise when necessary  - Involve family in performance of ADLs  - Assess for home care needs following discharge   - Consider OT consult to assist with ADL evaluation and planning for discharge  - Provide patient education as appropriate  Outcome: Progressing  Goal: Maintains/Returns to pre admission functional level  Description: INTERVENTIONS:  - Perform BMAT or MOVE assessment daily    - Set and communicate daily mobility goal to care team and patient/family/caregiver     - Collaborate with rehabilitation services on mobility goals if consulted  - Perform Range of Motion 3 times a day  - Reposition patient every 3 hours    - Dangle patient 3 times a day  - Stand patient 3 times a day  - Ambulate patient 3 times a day  - Out of bed to chair 3 times a day   - Out of bed for meals 3 times a day  - Out of bed for toileting  - Record patient progress and toleration of activity level   Outcome: Progressing     Problem: PAIN - ADULT  Goal: Verbalizes/displays adequate comfort level or baseline comfort level  Description: Interventions:  - Encourage patient to monitor pain and request assistance  - Assess pain using appropriate pain scale  - Administer analgesics based on type and severity of pain and evaluate response  - Implement non-pharmacological measures as appropriate and evaluate response  - Consider cultural and social influences on pain and pain management  - Notify physician/advanced practitioner if interventions unsuccessful or patient reports new pain  Outcome: Progressing     Problem: INFECTION - ADULT  Goal: Absence or prevention of progression during hospitalization  Description: INTERVENTIONS:  - Assess and monitor for signs and symptoms of infection  - Monitor lab/diagnostic results  - Monitor all insertion sites, i e  indwelling lines, tubes, and drains  - Monitor endotracheal if appropriate and nasal secretions for changes in amount and color  - [place] appropriate cooling/warming therapies per order  - Administer medications as ordered  - Instruct and encourage patient and family to use good hand hygiene technique  - Identify and instruct in appropriate isolation precautions for identified infection/condition  Outcome: Progressing  Goal: Absence of fever/infection during neutropenic period  Description: INTERVENTIONS:  - Monitor WBC    Outcome: Progressing     Problem: SAFETY ADULT  Goal: Patient will remain free of falls  Description: INTERVENTIONS:  - Educate patient/family on patient safety including physical limitations  - Instruct patient to call for assistance with activity   - Consult OT/PT to assist with strengthening/mobility   - Keep Call bell within reach  - Keep bed low and locked with side rails adjusted as appropriate  - Keep care items and personal belongings within reach  - Initiate and maintain comfort rounds  - Make Fall Risk Sign visible to staff  - Offer Toileting every 2 Hours, in advance of need  - Initiate/Maintain bed alarm  - Obtain necessary fall risk management equipment:   - Apply yellow socks and bracelet for high fall risk patients  - Consider moving patient to room near nurses station  Outcome: Progressing  Goal: Maintain or return to baseline ADL function  Description: INTERVENTIONS:  -  Assess patient's ability to carry out ADLs; assess patient's baseline for ADL function and identify physical deficits which impact ability to perform ADLs (bathing, care of mouth/teeth, toileting, grooming, dressing, etc )  - Assess/evaluate cause of self-care deficits   - Assess range of motion  - Assess patient's mobility; develop plan if impaired  - Assess patient's need for assistive devices and provide as appropriate  - Encourage maximum independence but intervene and supervise when necessary  - Involve family in performance of ADLs  - Assess for home care needs following discharge   - Consider OT consult to assist with ADL evaluation and planning for discharge  - Provide patient education as appropriate  Outcome: Progressing  Goal: Maintains/Returns to pre admission functional level  Description: INTERVENTIONS:  - Perform BMAT or MOVE assessment daily    - Set and communicate daily mobility goal to care team and patient/family/caregiver  - Collaborate with rehabilitation services on mobility goals if consulted  - Perform Range of Motion 3 times a day  - Reposition patient every 3 hours    - Dangle patient 3 times a day  - Stand patient 3 times a day  - Ambulate patient 3 times a day  - Out of bed to chair 3 times a day   - Out of bed for meals 3 times a day  - Out of bed for toileting  - Record patient progress and toleration of activity level   Outcome: Progressing     Problem: DISCHARGE PLANNING  Goal: Discharge to home or other facility with appropriate resources  Description: INTERVENTIONS:  - Identify barriers to discharge w/patient and caregiver  - Arrange for needed discharge resources and transportation as appropriate  - Identify discharge learning needs (meds, wound care, etc )  - Arrange for interpretive services to assist at discharge as needed  - Refer to Case Management Department for coordinating discharge planning if the patient needs post-hospital services based on physician/advanced practitioner order or complex needs related to functional status, cognitive ability, or social support system  Outcome: Progressing     Problem: Knowledge Deficit  Goal: Patient/family/caregiver demonstrates understanding of disease process, treatment plan, medications, and discharge instructions  Description: Complete learning assessment and assess knowledge base    Interventions:  - Provide teaching at level of understanding  - Provide teaching via preferred learning methods  Outcome: Progressing     Problem: Prexisting or High Potential for Compromised Skin Integrity  Goal: Skin integrity is maintained or improved  Description: INTERVENTIONS:  - Identify patients at risk for skin breakdown  - Assess and monitor skin integrity  - Assess and monitor nutrition and hydration status  - Monitor labs   - Assess for incontinence   - Turn and reposition patient  - Assist with mobility/ambulation  - Relieve pressure over bony prominences  - Avoid friction and shearing  - Provide appropriate hygiene as needed including keeping skin clean and dry  - Evaluate need for skin moisturizer/barrier cream  - Collaborate with interdisciplinary team   - Patient/family teaching  - Consider wound care consult   Outcome: Progressing

## 2022-07-30 NOTE — QUICK NOTE
Alerted by nursing staff patient feeling unwell, increased WOB/mild wheezing and new coughing this evening  Concern for possible slight volume overload as patient required 2L IVF bolus yesterday + continues IVF through today  On exam:   - patient saturating 90-94% on RA, no conversational dyspnea or resp distress  - CV RRR  - PULM: mild rales in all all fields, no wheezing/ronchi   - abdomen is nontender to palpation belly soft   - no significant edema in bilateral LE, no obvious JVP    Repeat CXR pending final read per radiology  On my read appears similar to CXR this am, possible slight pulm edema  BNP > 3000, ABG pending  Will hold further fluids tonight and administer 1 dose IV lasix 20 mg, monitor urine output and respiratory status  Will also trial 1x dose of nebs due to complaint of wheezing for patient comfort, however no history of asthma/COPD

## 2022-07-30 NOTE — PLAN OF CARE
Problem: Potential for Falls  Goal: Patient will remain free of falls  Description: INTERVENTIONS:  - Educate patient/family on patient safety including physical limitations  - Instruct patient to call for assistance with activity   - Consult OT/PT to assist with strengthening/mobility   - Keep Call bell within reach  - Keep bed low and locked with side rails adjusted as appropriate  - Keep care items and personal belongings within reach  - Initiate and maintain comfort rounds  - Make Fall Risk Sign visible to staff  - Offer Toileting every 2 Hours, in advance of need  - Initiate/Maintain bed alarm  - Obtain necessary fall risk management equipment: non slip socks  - Apply yellow socks and bracelet for high fall risk patients  - Consider moving patient to room near nurses station  Outcome: Progressing     Problem: MOBILITY - ADULT  Goal: Maintain or return to baseline ADL function  Description: INTERVENTIONS:  -  Assess patient's ability to carry out ADLs; assess patient's baseline for ADL function and identify physical deficits which impact ability to perform ADLs (bathing, care of mouth/teeth, toileting, grooming, dressing, etc )  - Assess/evaluate cause of self-care deficits   - Assess range of motion  - Assess patient's mobility; develop plan if impaired  - Assess patient's need for assistive devices and provide as appropriate  - Encourage maximum independence but intervene and supervise when necessary  - Involve family in performance of ADLs  - Assess for home care needs following discharge   - Consider OT consult to assist with ADL evaluation and planning for discharge  - Provide patient education as appropriate  Outcome: Progressing  Goal: Maintains/Returns to pre admission functional level  Description: INTERVENTIONS:  - Perform BMAT or MOVE assessment daily    - Set and communicate daily mobility goal to care team and patient/family/caregiver     - Collaborate with rehabilitation services on mobility goals if consulted  - Perform Range of Motion 5 times a day  - Reposition patient every 2 hours    - Dangle patient 3 times a day  - Stand patient 3 times a day  - Ambulate patient 3 times a day  - Out of bed to chair 3 times a day   - Out of bed for meals 3 times a day  - Out of bed for toileting  - Record patient progress and toleration of activity level   Outcome: Progressing     Problem: PAIN - ADULT  Goal: Verbalizes/displays adequate comfort level or baseline comfort level  Description: Interventions:  - Encourage patient to monitor pain and request assistance  - Assess pain using appropriate pain scale  - Administer analgesics based on type and severity of pain and evaluate response  - Implement non-pharmacological measures as appropriate and evaluate response  - Consider cultural and social influences on pain and pain management  - Notify physician/advanced practitioner if interventions unsuccessful or patient reports new pain  Outcome: Progressing     Problem: INFECTION - ADULT  Goal: Absence or prevention of progression during hospitalization  Description: INTERVENTIONS:  - Assess and monitor for signs and symptoms of infection  - Monitor lab/diagnostic results  - Monitor all insertion sites, i e  indwelling lines, tubes, and drains  - Monitor endotracheal if appropriate and nasal secretions for changes in amount and color  - [place] appropriate cooling/warming therapies per order  - Administer medications as ordered  - Instruct and encourage patient and family to use good hand hygiene technique  - Identify and instruct in appropriate isolation precautions for identified infection/condition  Outcome: Progressing  Goal: Absence of fever/infection during neutropenic period  Description: INTERVENTIONS:  - Monitor WBC    Outcome: Progressing     Problem: SAFETY ADULT  Goal: Patient will remain free of falls  Description: INTERVENTIONS:  - Educate patient/family on patient safety including physical limitations  - Instruct patient to call for assistance with activity   - Consult OT/PT to assist with strengthening/mobility   - Keep Call bell within reach  - Keep bed low and locked with side rails adjusted as appropriate  - Keep care items and personal belongings within reach  - Initiate and maintain comfort rounds  - Make Fall Risk Sign visible to staff  - Offer Toileting every 2 Hours, in advance of need  - Initiate/Maintain bed alarm  - Obtain necessary fall risk management equipment: non slip socks  - Apply yellow socks and bracelet for high fall risk patients  - Consider moving patient to room near nurses station  Outcome: Progressing  Goal: Maintain or return to baseline ADL function  Description: INTERVENTIONS:  -  Assess patient's ability to carry out ADLs; assess patient's baseline for ADL function and identify physical deficits which impact ability to perform ADLs (bathing, care of mouth/teeth, toileting, grooming, dressing, etc )  - Assess/evaluate cause of self-care deficits   - Assess range of motion  - Assess patient's mobility; develop plan if impaired  - Assess patient's need for assistive devices and provide as appropriate  - Encourage maximum independence but intervene and supervise when necessary  - Involve family in performance of ADLs  - Assess for home care needs following discharge   - Consider OT consult to assist with ADL evaluation and planning for discharge  - Provide patient education as appropriate  Outcome: Progressing  Goal: Maintains/Returns to pre admission functional level  Description: INTERVENTIONS:  - Perform BMAT or MOVE assessment daily    - Set and communicate daily mobility goal to care team and patient/family/caregiver  - Collaborate with rehabilitation services on mobility goals if consulted  - Perform Range of Motion 5 times a day  - Reposition patient every 2 hours    - Dangle patient 3 times a day  - Stand patient 3 times a day  - Ambulate patient 3 times a day  - Out of bed to chair 3 times a day   - Out of bed for meals 3 times a day  - Out of bed for toileting  - Record patient progress and toleration of activity level   Outcome: Progressing     Problem: DISCHARGE PLANNING  Goal: Discharge to home or other facility with appropriate resources  Description: INTERVENTIONS:  - Identify barriers to discharge w/patient and caregiver  - Arrange for needed discharge resources and transportation as appropriate  - Identify discharge learning needs (meds, wound care, etc )  - Arrange for interpretive services to assist at discharge as needed  - Refer to Case Management Department for coordinating discharge planning if the patient needs post-hospital services based on physician/advanced practitioner order or complex needs related to functional status, cognitive ability, or social support system  Outcome: Progressing     Problem: Knowledge Deficit  Goal: Patient/family/caregiver demonstrates understanding of disease process, treatment plan, medications, and discharge instructions  Description: Complete learning assessment and assess knowledge base    Interventions:  - Provide teaching at level of understanding  - Provide teaching via preferred learning methods  Outcome: Progressing     Problem: Prexisting or High Potential for Compromised Skin Integrity  Goal: Skin integrity is maintained or improved  Description: INTERVENTIONS:  - Identify patients at risk for skin breakdown  - Assess and monitor skin integrity  - Assess and monitor nutrition and hydration status  - Monitor labs   - Assess for incontinence   - Turn and reposition patient  - Assist with mobility/ambulation  - Relieve pressure over bony prominences  - Avoid friction and shearing  - Provide appropriate hygiene as needed including keeping skin clean and dry  - Evaluate need for skin moisturizer/barrier cream  - Collaborate with interdisciplinary team   - Patient/family teaching  - Consider wound care consult   Outcome: Progressing

## 2022-07-31 VITALS
DIASTOLIC BLOOD PRESSURE: 67 MMHG | SYSTOLIC BLOOD PRESSURE: 138 MMHG | TEMPERATURE: 98.2 F | RESPIRATION RATE: 17 BRPM | HEART RATE: 65 BPM | BODY MASS INDEX: 35.92 KG/M2 | HEIGHT: 61 IN | OXYGEN SATURATION: 94 % | WEIGHT: 190.26 LBS

## 2022-07-31 LAB
ALBUMIN SERPL BCP-MCNC: 2.7 G/DL (ref 3.5–5)
ALP SERPL-CCNC: 78 U/L (ref 46–116)
ALT SERPL W P-5'-P-CCNC: 35 U/L (ref 12–78)
ANION GAP SERPL CALCULATED.3IONS-SCNC: 7 MMOL/L (ref 4–13)
AST SERPL W P-5'-P-CCNC: 22 U/L (ref 5–45)
BASOPHILS # BLD AUTO: 0.05 THOUSANDS/ΜL (ref 0–0.1)
BASOPHILS NFR BLD AUTO: 1 % (ref 0–1)
BILIRUB SERPL-MCNC: 0.5 MG/DL (ref 0.2–1)
BUN SERPL-MCNC: 11 MG/DL (ref 5–25)
CALCIUM ALBUM COR SERPL-MCNC: 10 MG/DL (ref 8.3–10.1)
CALCIUM SERPL-MCNC: 9 MG/DL (ref 8.3–10.1)
CHLORIDE SERPL-SCNC: 102 MMOL/L (ref 96–108)
CO2 SERPL-SCNC: 32 MMOL/L (ref 21–32)
CREAT SERPL-MCNC: 0.81 MG/DL (ref 0.6–1.3)
EOSINOPHIL # BLD AUTO: 0.25 THOUSAND/ΜL (ref 0–0.61)
EOSINOPHIL NFR BLD AUTO: 3 % (ref 0–6)
ERYTHROCYTE [DISTWIDTH] IN BLOOD BY AUTOMATED COUNT: 12 % (ref 11.6–15.1)
GFR SERPL CREATININE-BSD FRML MDRD: 81 ML/MIN/1.73SQ M
GLUCOSE SERPL-MCNC: 153 MG/DL (ref 65–140)
GLUCOSE SERPL-MCNC: 188 MG/DL (ref 65–140)
GLUCOSE SERPL-MCNC: 246 MG/DL (ref 65–140)
HCT VFR BLD AUTO: 33.7 % (ref 34.8–46.1)
HGB BLD-MCNC: 11.5 G/DL (ref 11.5–15.4)
IMM GRANULOCYTES # BLD AUTO: 0.08 THOUSAND/UL (ref 0–0.2)
IMM GRANULOCYTES NFR BLD AUTO: 1 % (ref 0–2)
LYMPHOCYTES # BLD AUTO: 1.59 THOUSANDS/ΜL (ref 0.6–4.47)
LYMPHOCYTES NFR BLD AUTO: 20 % (ref 14–44)
MCH RBC QN AUTO: 30.1 PG (ref 26.8–34.3)
MCHC RBC AUTO-ENTMCNC: 34.1 G/DL (ref 31.4–37.4)
MCV RBC AUTO: 88 FL (ref 82–98)
MONOCYTES # BLD AUTO: 0.6 THOUSAND/ΜL (ref 0.17–1.22)
MONOCYTES NFR BLD AUTO: 8 % (ref 4–12)
NEUTROPHILS # BLD AUTO: 5.36 THOUSANDS/ΜL (ref 1.85–7.62)
NEUTS SEG NFR BLD AUTO: 67 % (ref 43–75)
NRBC BLD AUTO-RTO: 0 /100 WBCS
PLATELET # BLD AUTO: 295 THOUSANDS/UL (ref 149–390)
PMV BLD AUTO: 8.9 FL (ref 8.9–12.7)
POTASSIUM SERPL-SCNC: 3.4 MMOL/L (ref 3.5–5.3)
PROT SERPL-MCNC: 6.6 G/DL (ref 6.4–8.4)
RBC # BLD AUTO: 3.82 MILLION/UL (ref 3.81–5.12)
SODIUM SERPL-SCNC: 141 MMOL/L (ref 135–147)
WBC # BLD AUTO: 7.93 THOUSAND/UL (ref 4.31–10.16)

## 2022-07-31 PROCEDURE — 82948 REAGENT STRIP/BLOOD GLUCOSE: CPT

## 2022-07-31 PROCEDURE — 99239 HOSP IP/OBS DSCHRG MGMT >30: CPT | Performed by: HOSPITALIST

## 2022-07-31 PROCEDURE — 80053 COMPREHEN METABOLIC PANEL: CPT | Performed by: INTERNAL MEDICINE

## 2022-07-31 PROCEDURE — 85025 COMPLETE CBC W/AUTO DIFF WBC: CPT | Performed by: INTERNAL MEDICINE

## 2022-07-31 RX ORDER — CIPROFLOXACIN 500 MG/1
500 TABLET, FILM COATED ORAL EVERY 12 HOURS SCHEDULED
Qty: 20 TABLET | Refills: 0 | Status: SHIPPED | OUTPATIENT
Start: 2022-07-31 | End: 2022-08-10

## 2022-07-31 RX ORDER — POTASSIUM CHLORIDE 20 MEQ/1
40 TABLET, EXTENDED RELEASE ORAL ONCE
Status: COMPLETED | OUTPATIENT
Start: 2022-07-31 | End: 2022-07-31

## 2022-07-31 RX ADMIN — CIPROFLOXACIN HYDROCHLORIDE 500 MG: 500 TABLET, FILM COATED ORAL at 08:00

## 2022-07-31 RX ADMIN — ASPIRIN 81 MG: 81 TABLET, COATED ORAL at 08:00

## 2022-07-31 RX ADMIN — POTASSIUM CHLORIDE 40 MEQ: 1500 TABLET, EXTENDED RELEASE ORAL at 08:00

## 2022-07-31 RX ADMIN — HYDROCHLOROTHIAZIDE 12.5 MG: 12.5 TABLET ORAL at 08:00

## 2022-07-31 RX ADMIN — HEPARIN SODIUM 5000 UNITS: 5000 INJECTION INTRAVENOUS; SUBCUTANEOUS at 05:28

## 2022-07-31 RX ADMIN — CLOPIDOGREL BISULFATE 75 MG: 75 TABLET ORAL at 08:00

## 2022-07-31 RX ADMIN — ESCITALOPRAM OXALATE 10 MG: 10 TABLET ORAL at 08:00

## 2022-07-31 RX ADMIN — INSULIN LISPRO 3 UNITS: 100 INJECTION, SOLUTION INTRAVENOUS; SUBCUTANEOUS at 11:19

## 2022-07-31 RX ADMIN — ATORVASTATIN CALCIUM 80 MG: 40 TABLET, FILM COATED ORAL at 08:00

## 2022-07-31 RX ADMIN — CARVEDILOL 6.25 MG: 3.12 TABLET, FILM COATED ORAL at 08:00

## 2022-07-31 RX ADMIN — INSULIN LISPRO 1 UNITS: 100 INJECTION, SOLUTION INTRAVENOUS; SUBCUTANEOUS at 08:00

## 2022-07-31 RX ADMIN — ACETAMINOPHEN 650 MG: 325 TABLET ORAL at 07:10

## 2022-07-31 NOTE — ASSESSMENT & PLAN NOTE
· Initially held all antihypertensives in setting of fluid resuscitation and sepsis  · Resume  hctz and coreg 7/30; home regimen at dc  · Home regimen: HCTZ, Coreg and Losartan

## 2022-07-31 NOTE — ASSESSMENT & PLAN NOTE
Lab Results   Component Value Date    HGBA1C 7 3 (H) 06/03/2022       Recent Labs     07/30/22  1117 07/30/22  1630 07/30/22  2233 07/31/22  0723   POCGLU 185* 200* 162* 188*       Blood Sugar Average: Last 72 hrs:  (P) 219 [id number]     · Holding oral hyperglycemics  · Sliding scale insulin coverage

## 2022-07-31 NOTE — DISCHARGE SUMMARY
[place]  Discharge- Devan Cardoza 1965, 64 y o  female MRN: [id number]  Unit/Bed#: -01 Encounter: [id number]  Primary Care Provider: JEANCARLOS Cee   Date and time admitted to hospital: 7/27/2022  3:29 PM    Hypoxia  Assessment & Plan  · Nocturnal hypoxia noted however pt mentions h/o sleep apnea   · CXR obtained and reveals atelectasis, continue IS and advise outpatient sleep study if not already on CPAP  · Satting well on RA upon my exam this AM  · resolved    Pyelitis  Assessment & Plan  · Plan as noted in sepsis  · Monitor PCN output  · No noted emphysematous drainage from PCN   ·     Depression, recurrent ([address] )  Assessment & Plan  · Continue Lexapro    BMI 36 0-36 9,adult  Assessment & Plan  · Encouraged diet modifications    Type 2 diabetes mellitus, without long-term current use of insulin [facility])  Assessment & Plan  Lab Results   Component Value Date    HGBA1C 7 3 (H) 06/03/2022       Recent Labs     07/30/22  1117 07/30/22  1630 07/30/22  2233 07/31/22  0723   POCGLU 185* 200* 162* 188*       Blood Sugar Average: Last 72 hrs:  (P) 219 [id number]     · Holding oral hyperglycemics  · Sliding scale insulin coverage    Hyperlipidemia  Assessment & Plan  · Continue statin    Hypertension  Assessment & Plan  · Initially held all antihypertensives in setting of fluid resuscitation and sepsis  · Resume  hctz and coreg 7/30; home regimen at dc  · Home regimen: HCTZ, Coreg and Losartan     Coronary artery disease involving coronary bypass graft of native heart without angina pectoris  Assessment & Plan  · Maintain on aspirin and Plavix, continued post procedure    * Sepsis [facility])  Assessment & Plan  Presents to the ED with nausea, vomiting and abdominal pain     Evidenced by tachycardia, tachypnea, leukocytosis, and lactatemia  · UA: Large leukocytes, + nitrites, innumerable WBC and bacteria  · CT scan revealed moderate left hydronephrosis with extensive gas throughout the left renal pelvis and throughout the left ureter in keeping with an emphysematous UTI  · Started on CTX and broadened to Cefepime, Day#3 of cephalosporin  · Blood cultures x2 with 1/2 pos for GPC in clusters, likely contaminant as UCx growing GNR  · Repeat cultures neg, ensure surveillance cultures remain clear prior to transitioning to PO abx  · Urine culture growing pan sensitive E coli  · DC ON Cipro   · S/p PCN by IR  · No obstructing stones seen reviewed with patient, outpatient urology follow  Monitor fever - PRN Tylenol  · Cap IVF  · Garcia removed by urology  Outpt f/u for PCN removal       Hospital Course:     Maureen Cedeño is a 64 y o  female patient who originally presented to the hospital on   Admission Orders (From admission, onward)     Ordered        07/27/22 2000  INPATIENT ADMISSION  Once                     due to emphysematous pyelitis  Patient was seen by Urology and underwent PCN placement by IR  The patient will have follow-up with Urology as an outpatient for further management of the PCN as per my review with the advanced practice provider on-call  Patient has no signs of sepsis at the time of discharge  She is asymptomatic and will complete an extended course of antibiotics  Please see above list of diagnoses and related plan for additional information  Physical Exam:    GEN: No acute distress, comfortable, obese  HEEENT: No JVD, PERRLA, no scleral icterus  RESP: Lungs clear to auscultation bilaterally  CV: RRR, +s1/s2   ABD: SOFT NON TENDER, POSITIVE BOWEL SOUNDS, NO DISTENTION  Gu: pcn  PSYCH: CALM  NEURO: A X O X 3, NO FOCAL DEFICITS  SKIN: NO RASH  EXTREM: NO EDEMA      Condition at Discharge:  good      Discharge instructions/Information to patient and family:   See after visit summary for information provided to patient and family        Provisions for Follow-Up Care:  See after visit summary for information related to follow-up care and any pertinent home health orders  Disposition:     Home       Discharge Statement:  I spent 35 minutes discharging the patient  This time was spent on the day of discharge  I had direct contact with the patient on the day of discharge  Greater than 50% of the total time was spent examining patient, answering all patient questions, arranging and discussing plan of care with patient as well as directly providing post-discharge instructions  Additional time then spent on discharge activities  Discharge Medications:  See after visit summary for reconciled discharge medications provided to patient and family        ** Please Note: This note has been constructed using a voice recognition system **

## 2022-07-31 NOTE — PLAN OF CARE
Problem: Potential for Falls  Goal: Patient will remain free of falls  Description: INTERVENTIONS:  - Educate patient/family on patient safety including physical limitations  - Instruct patient to call for assistance with activity   - Consult OT/PT to assist with strengthening/mobility   - Keep Call bell within reach  - Keep bed low and locked with side rails adjusted as appropriate  - Keep care items and personal belongings within reach  - Initiate and maintain comfort rounds  - Make Fall Risk Sign visible to staff  - Offer Toileting every 2 Hours, in advance of need  - Initiate/Maintain bed alarm  - Obtain necessary fall risk management equipment: non slip socks  - Apply yellow socks and bracelet for high fall risk patients  - Consider moving patient to room near nurses station  Outcome: Progressing     Problem: MOBILITY - ADULT  Goal: Maintain or return to baseline ADL function  Description: INTERVENTIONS:  -  Assess patient's ability to carry out ADLs; assess patient's baseline for ADL function and identify physical deficits which impact ability to perform ADLs (bathing, care of mouth/teeth, toileting, grooming, dressing, etc )  - Assess/evaluate cause of self-care deficits   - Assess range of motion  - Assess patient's mobility; develop plan if impaired  - Assess patient's need for assistive devices and provide as appropriate  - Encourage maximum independence but intervene and supervise when necessary  - Involve family in performance of ADLs  - Assess for home care needs following discharge   - Consider OT consult to assist with ADL evaluation and planning for discharge  - Provide patient education as appropriate  Outcome: Progressing     Problem: PAIN - ADULT  Goal: Verbalizes/displays adequate comfort level or baseline comfort level  Description: Interventions:  - Encourage patient to monitor pain and request assistance  - Assess pain using appropriate pain scale  - Administer analgesics based on type and severity of pain and evaluate response  - Implement non-pharmacological measures as appropriate and evaluate response  - Consider cultural and social influences on pain and pain management  - Notify physician/advanced practitioner if interventions unsuccessful or patient reports new pain  Outcome: Progressing     Problem: INFECTION - ADULT  Goal: Absence or prevention of progression during hospitalization  Description: INTERVENTIONS:  - Assess and monitor for signs and symptoms of infection  - Monitor lab/diagnostic results  - Monitor all insertion sites, i e  indwelling lines, tubes, and drains  - Monitor endotracheal if appropriate and nasal secretions for changes in amount and color  - [place] appropriate cooling/warming therapies per order  - Administer medications as ordered  - Instruct and encourage patient and family to use good hand hygiene technique  - Identify and instruct in appropriate isolation precautions for identified infection/condition  Outcome: Progressing  Goal: Absence of fever/infection during neutropenic period  Description: INTERVENTIONS:  - Monitor WBC    Outcome: Progressing     Problem: SAFETY ADULT  Goal: Patient will remain free of falls  Description: INTERVENTIONS:  - Educate patient/family on patient safety including physical limitations  - Instruct patient to call for assistance with activity   - Consult OT/PT to assist with strengthening/mobility   - Keep Call bell within reach  - Keep bed low and locked with side rails adjusted as appropriate  - Keep care items and personal belongings within reach  - Initiate and maintain comfort rounds  - Make Fall Risk Sign visible to staff  - Offer Toileting every 2 Hours, in advance of need  - Initiate/Maintain bed alarm  - Obtain necessary fall risk management equipment: non slip socks  - Apply yellow socks and bracelet for high fall risk patients  - Consider moving patient to room near nurses station  Outcome: Progressing  Goal: Maintain or return to baseline ADL function  Description: INTERVENTIONS:  -  Assess patient's ability to carry out ADLs; assess patient's baseline for ADL function and identify physical deficits which impact ability to perform ADLs (bathing, care of mouth/teeth, toileting, grooming, dressing, etc )  - Assess/evaluate cause of self-care deficits   - Assess range of motion  - Assess patient's mobility; develop plan if impaired  - Assess patient's need for assistive devices and provide as appropriate  - Encourage maximum independence but intervene and supervise when necessary  - Involve family in performance of ADLs  - Assess for home care needs following discharge   - Consider OT consult to assist with ADL evaluation and planning for discharge  - Provide patient education as appropriate  Outcome: Progressing     Problem: DISCHARGE PLANNING  Goal: Discharge to home or other facility with appropriate resources  Description: INTERVENTIONS:  - Identify barriers to discharge w/patient and caregiver  - Arrange for needed discharge resources and transportation as appropriate  - Identify discharge learning needs (meds, wound care, etc )  - Arrange for interpretive services to assist at discharge as needed  - Refer to Case Management Department for coordinating discharge planning if the patient needs post-hospital services based on physician/advanced practitioner order or complex needs related to functional status, cognitive ability, or social support system  Outcome: Progressing     Problem: Knowledge Deficit  Goal: Patient/family/caregiver demonstrates understanding of disease process, treatment plan, medications, and discharge instructions  Description: Complete learning assessment and assess knowledge base    Interventions:  - Provide teaching at level of understanding  - Provide teaching via preferred learning methods  Outcome: Progressing     Problem: Prexisting or High Potential for Compromised Skin Integrity  Goal: Skin integrity is maintained or improved  Description: INTERVENTIONS:  - Identify patients at risk for skin breakdown  - Assess and monitor skin integrity  - Assess and monitor nutrition and hydration status  - Monitor labs   - Assess for incontinence   - Turn and reposition patient  - Assist with mobility/ambulation  - Relieve pressure over bony prominences  - Avoid friction and shearing  - Provide appropriate hygiene as needed including keeping skin clean and dry  - Evaluate need for skin moisturizer/barrier cream  - Collaborate with interdisciplinary team   - Patient/family teaching  - Consider wound care consult   Outcome: Progressing

## 2022-07-31 NOTE — ASSESSMENT & PLAN NOTE
Presents to the ED with nausea, vomiting and abdominal pain  Evidenced by tachycardia, tachypnea, leukocytosis, and lactatemia  · UA: Large leukocytes, + nitrites, innumerable WBC and bacteria  · CT scan revealed moderate left hydronephrosis with extensive gas throughout the left renal pelvis and throughout the left ureter in keeping with an emphysematous UTI  · Started on CTX and broadened to Cefepime, Day#3 of cephalosporin  · Blood cultures x2 with 1/2 pos for GPC in clusters, likely contaminant as UCx growing GNR  · Repeat cultures neg, ensure surveillance cultures remain clear prior to transitioning to PO abx  · Urine culture growing pan sensitive E coli  · DC ON Cipro   · S/p PCN by IR  · No obstructing stones seen reviewed with patient, outpatient urology follow  Monitor fever - PRN Tylenol  · Cap IVF  · Garcia removed by urology   Outpt f/u for PCN removal

## 2022-07-31 NOTE — PLAN OF CARE
Problem: Potential for Falls  Goal: Patient will remain free of falls  Description: INTERVENTIONS:  - Educate patient/family on patient safety including physical limitations  - Instruct patient to call for assistance with activity   - Consult OT/PT to assist with strengthening/mobility   - Keep Call bell within reach  - Keep bed low and locked with side rails adjusted as appropriate  - Keep care items and personal belongings within reach  - Initiate and maintain comfort rounds  - Make Fall Risk Sign visible to staff  - Offer Toileting every 2 Hours, in advance of need  - Initiate/Maintain bed alarm  - Obtain necessary fall risk management equipment: non slip socks  - Apply yellow socks and bracelet for high fall risk patients  - Consider moving patient to room near nurses station  Outcome: Progressing     Problem: MOBILITY - ADULT  Goal: Maintain or return to baseline ADL function  Description: INTERVENTIONS:  -  Assess patient's ability to carry out ADLs; assess patient's baseline for ADL function and identify physical deficits which impact ability to perform ADLs (bathing, care of mouth/teeth, toileting, grooming, dressing, etc )  - Assess/evaluate cause of self-care deficits   - Assess range of motion  - Assess patient's mobility; develop plan if impaired  - Assess patient's need for assistive devices and provide as appropriate  - Encourage maximum independence but intervene and supervise when necessary  - Involve family in performance of ADLs  - Assess for home care needs following discharge   - Consider OT consult to assist with ADL evaluation and planning for discharge  - Provide patient education as appropriate  Outcome: Progressing     Problem: PAIN - ADULT  Goal: Verbalizes/displays adequate comfort level or baseline comfort level  Description: Interventions:  - Encourage patient to monitor pain and request assistance  - Assess pain using appropriate pain scale  - Administer analgesics based on type and severity of pain and evaluate response  - Implement non-pharmacological measures as appropriate and evaluate response  - Consider cultural and social influences on pain and pain management  - Notify physician/advanced practitioner if interventions unsuccessful or patient reports new pain  Outcome: Progressing     Problem: INFECTION - ADULT  Goal: Absence or prevention of progression during hospitalization  Description: INTERVENTIONS:  - Assess and monitor for signs and symptoms of infection  - Monitor lab/diagnostic results  - Monitor all insertion sites, i e  indwelling lines, tubes, and drains  - Monitor endotracheal if appropriate and nasal secretions for changes in amount and color  - [place] appropriate cooling/warming therapies per order  - Administer medications as ordered  - Instruct and encourage patient and family to use good hand hygiene technique  - Identify and instruct in appropriate isolation precautions for identified infection/condition  Outcome: Progressing  Goal: Absence of fever/infection during neutropenic period  Description: INTERVENTIONS:  - Monitor WBC    Outcome: Progressing     Problem: SAFETY ADULT  Goal: Patient will remain free of falls  Description: INTERVENTIONS:  - Educate patient/family on patient safety including physical limitations  - Instruct patient to call for assistance with activity   - Consult OT/PT to assist with strengthening/mobility   - Keep Call bell within reach  - Keep bed low and locked with side rails adjusted as appropriate  - Keep care items and personal belongings within reach  - Initiate and maintain comfort rounds  - Make Fall Risk Sign visible to staff  - Offer Toileting every 2 Hours, in advance of need  - Initiate/Maintain bed alarm  - Obtain necessary fall risk management equipment: non slip socks  - Apply yellow socks and bracelet for high fall risk patients  - Consider moving patient to room near nurses station  Outcome: Progressing  Goal: Maintain or return to baseline ADL function  Description: INTERVENTIONS:  -  Assess patient's ability to carry out ADLs; assess patient's baseline for ADL function and identify physical deficits which impact ability to perform ADLs (bathing, care of mouth/teeth, toileting, grooming, dressing, etc )  - Assess/evaluate cause of self-care deficits   - Assess range of motion  - Assess patient's mobility; develop plan if impaired  - Assess patient's need for assistive devices and provide as appropriate  - Encourage maximum independence but intervene and supervise when necessary  - Involve family in performance of ADLs  - Assess for home care needs following discharge   - Consider OT consult to assist with ADL evaluation and planning for discharge  - Provide patient education as appropriate  Outcome: Progressing     Problem: DISCHARGE PLANNING  Goal: Discharge to home or other facility with appropriate resources  Description: INTERVENTIONS:  - Identify barriers to discharge w/patient and caregiver  - Arrange for needed discharge resources and transportation as appropriate  - Identify discharge learning needs (meds, wound care, etc )  - Arrange for interpretive services to assist at discharge as needed  - Refer to Case Management Department for coordinating discharge planning if the patient needs post-hospital services based on physician/advanced practitioner order or complex needs related to functional status, cognitive ability, or social support system  Outcome: Progressing     Problem: Knowledge Deficit  Goal: Patient/family/caregiver demonstrates understanding of disease process, treatment plan, medications, and discharge instructions  Description: Complete learning assessment and assess knowledge base    Interventions:  - Provide teaching at level of understanding  - Provide teaching via preferred learning methods  Outcome: Progressing     Problem: Prexisting or High Potential for Compromised Skin Integrity  Goal: Skin integrity is maintained or improved  Description: INTERVENTIONS:  - Identify patients at risk for skin breakdown  - Assess and monitor skin integrity  - Assess and monitor nutrition and hydration status  - Monitor labs   - Assess for incontinence   - Turn and reposition patient  - Assist with mobility/ambulation  - Relieve pressure over bony prominences  - Avoid friction and shearing  - Provide appropriate hygiene as needed including keeping skin clean and dry  - Evaluate need for skin moisturizer/barrier cream  - Collaborate with interdisciplinary team   - Patient/family teaching  - Consider wound care consult   Outcome: Progressing

## 2022-08-01 ENCOUNTER — TRANSITIONAL CARE MANAGEMENT (OUTPATIENT)
Dept: FAMILY MEDICINE CLINIC | Facility: HOSPITAL | Age: 57
End: 2022-08-01

## 2022-08-01 ENCOUNTER — TELEPHONE (OUTPATIENT)
Dept: OTHER | Facility: OTHER | Age: 57
End: 2022-08-01

## 2022-08-01 ENCOUNTER — HOME CARE VISIT (OUTPATIENT)
Dept: HOME HEALTH SERVICES | Facility: HOME HEALTHCARE | Age: 57
End: 2022-08-01
Payer: MEDICARE

## 2022-08-01 VITALS
OXYGEN SATURATION: 98 % | DIASTOLIC BLOOD PRESSURE: 80 MMHG | SYSTOLIC BLOOD PRESSURE: 112 MMHG | TEMPERATURE: 97.4 F | HEART RATE: 64 BPM | RESPIRATION RATE: 16 BRPM

## 2022-08-01 LAB — BACTERIA BLD CULT: NORMAL

## 2022-08-01 PROCEDURE — 400013 VN SOC

## 2022-08-01 PROCEDURE — 10330081 VN NO-PAY CLAIM PROCEDURE

## 2022-08-01 PROCEDURE — G0299 HHS/HOSPICE OF RN EA 15 MIN: HCPCS

## 2022-08-01 NOTE — CASE COMMUNICATION
[facility] has admitted your patient to [facility] service with the following disciplines:      SN  Response needed, please respond via TT or epic in basket of via telephone to 21 [phone]  Primary focus of home health care   Patient stated goals of care to have nephro tube removed   Anticipated visit pattern and next visit date 2w 9w next visit 8 4  See medication list -  Significant clinical findings na   Potential barriers to  goal achievement na   Other pertinent information there are no orders for nephto tube to be flushed and just wanted to clarify that this is accurate    Thank you for allowing us to participate in the care of your patient      Jacey Hinkle

## 2022-08-01 NOTE — TELEPHONE ENCOUNTER
Patient has no orders after discharge to flush Nephrostomy tube  Please contact Flor with [facility] @ [phone]  Patient will need new orders

## 2022-08-03 ENCOUNTER — OFFICE VISIT (OUTPATIENT)
Dept: FAMILY MEDICINE CLINIC | Facility: HOSPITAL | Age: 57
End: 2022-08-03
Payer: MEDICARE

## 2022-08-03 ENCOUNTER — TELEPHONE (OUTPATIENT)
Dept: UROLOGY | Facility: AMBULATORY SURGERY CENTER | Age: 57
End: 2022-08-03

## 2022-08-03 VITALS
TEMPERATURE: 97.2 F | BODY MASS INDEX: 33.99 KG/M2 | DIASTOLIC BLOOD PRESSURE: 80 MMHG | HEIGHT: 61 IN | HEART RATE: 68 BPM | OXYGEN SATURATION: 97 % | WEIGHT: 180 LBS | SYSTOLIC BLOOD PRESSURE: 132 MMHG

## 2022-08-03 DIAGNOSIS — Z09 HOSPITAL DISCHARGE FOLLOW-UP: Primary | ICD-10-CM

## 2022-08-03 DIAGNOSIS — N12 PYELITIS: ICD-10-CM

## 2022-08-03 PROBLEM — A41.9 SEPSIS (HCC): Status: RESOLVED | Noted: 2022-07-27 | Resolved: 2022-08-03

## 2022-08-03 LAB
BACTERIA BLD CULT: NORMAL
BACTERIA BLD CULT: NORMAL

## 2022-08-03 PROCEDURE — 99495 TRANSJ CARE MGMT MOD F2F 14D: CPT | Performed by: NURSE PRACTITIONER

## 2022-08-03 NOTE — TELEPHONE ENCOUNTER
Please Triage  New Patient    What is the reason for the patients appointment? NP- just released from the hospital with a nephrostomy bag on Monday 8/1 and called to schedule a f/u appointment per discharge instructions  Patient can be reached at [phone]    What office location does the patient prefer? [facility]     Imaging/Lab Results: in Epic    Do we accept the patient's insurance or is the patient Self-Pay? Medicare   Insurance Provider:  Plan Type/Number:  Member ID#: Has the patient had any previous Urologist(s)? No    Have patient records been requested? If not are records showing in Epic:   In Epic    Has the patient had any outside testing done? No    Does the patient have a personal history of cancer?   No

## 2022-08-03 NOTE — PROGRESS NOTES
Assessment/Plan:    Pyelitis  Related to kidney stones  With PCN tube  Needs urology f/u and plans to call and schedule  VNA for PCN tube care  Complete Cipro as prescribed  Diagnoses and all orders for this visit:    Hospital discharge follow-up    Pyelitis          Subjective:      Patient ID: Kelly Hoffmann is a 64 y o  female  ER 7/27 with N/V/D and abdominal pain  Lactic acid was elevated  UA positive, leukocytosis  CT A/P showed moderate hydronephrosis, emphysematous UTI  Nonobstructive 13 mm and 14 mm renal pelvic calculi  Was transferred to ICU  PCN tube placed in IR  Was d/c'd on 7/31 with PCN tube and VNA  On Cipro    Having some tenderness at tube site  VNA came out yesterday  Producing urine  No fever or chills  Called central scheduling but didn't want to wait on hold  Would like to see a urologist closer  The following portions of the patient's history were reviewed and updated as appropriate: allergies, current medications, past family history, past medical history, past social history, past surgical history and problem list     Review of Systems   Constitutional: Negative for chills and fever  Genitourinary: Positive for flank pain (at PCN site)  PCN tube placement  Objective:  Vitals:    08/03/22 0943   BP: 132/80   Pulse: 68   Temp: (!) 97 2 °F (36 2 °C)   SpO2: 97%      Physical Exam  Vitals reviewed  Constitutional:       Appearance: Normal appearance  She is obese  Cardiovascular:      Rate and Rhythm: Normal rate and regular rhythm  Heart sounds: Normal heart sounds  No murmur heard  Pulmonary:      Effort: Pulmonary effort is normal       Breath sounds: Normal breath sounds  Genitourinary:     Comments: Left PCN tube intact draining clear yellow urine  Site w/o s/s infection  Skin:     General: Skin is warm and dry  Neurological:      Mental Status: She is alert and oriented to person, place, and time     Psychiatric:         Mood and Affect: Mood normal          Behavior: Behavior normal          Thought Content:  Thought content normal          Judgment: Judgment normal

## 2022-08-04 ENCOUNTER — HOME CARE VISIT (OUTPATIENT)
Dept: HOME HEALTH SERVICES | Facility: HOME HEALTHCARE | Age: 57
End: 2022-08-04
Payer: MEDICARE

## 2022-08-04 VITALS
TEMPERATURE: 96.5 F | SYSTOLIC BLOOD PRESSURE: 100 MMHG | DIASTOLIC BLOOD PRESSURE: 60 MMHG | HEART RATE: 68 BPM | OXYGEN SATURATION: 97 % | RESPIRATION RATE: 18 BRPM

## 2022-08-04 PROCEDURE — G0299 HHS/HOSPICE OF RN EA 15 MIN: HCPCS

## 2022-08-04 NOTE — TELEPHONE ENCOUNTER
Please advise  Tate Clancy is calling asking for an update ASAP  Patient also seems to think she has an appt that was set up with Manisha Luna at [facility], she said someone named "Alma" set up and appt for her       Please advise and get back to Mirella MCKENZIE, there's another encounter open as well from [facility] on 8/1/2022    Best call back [phone]

## 2022-08-04 NOTE — TELEPHONE ENCOUNTER
Then left message for Laila Quinones that she is scheduled for next Wednesday 8/10 @ 11:00 in the [facility]

## 2022-08-06 DIAGNOSIS — I25.10 CORONARY ARTERY DISEASE INVOLVING NATIVE CORONARY ARTERY OF NATIVE HEART WITHOUT ANGINA PECTORIS: ICD-10-CM

## 2022-08-08 ENCOUNTER — HOME CARE VISIT (OUTPATIENT)
Dept: HOME HEALTH SERVICES | Facility: HOME HEALTHCARE | Age: 57
End: 2022-08-08
Payer: MEDICARE

## 2022-08-08 DIAGNOSIS — N76.0 ACUTE VAGINITIS: Primary | ICD-10-CM

## 2022-08-08 PROCEDURE — G0180 MD CERTIFICATION HHA PATIENT: HCPCS | Performed by: HOSPITALIST

## 2022-08-08 RX ORDER — CLOPIDOGREL BISULFATE 75 MG/1
TABLET ORAL
Qty: 90 TABLET | Refills: 1 | Status: SHIPPED | OUTPATIENT
Start: 2022-08-08

## 2022-08-08 RX ORDER — FLUCONAZOLE 150 MG/1
150 TABLET ORAL ONCE
Qty: 1 TABLET | Refills: 0 | Status: SHIPPED | OUTPATIENT
Start: 2022-08-08 | End: 2022-08-08

## 2022-08-08 NOTE — Clinical Note
Call from pt stating she stopped her antibiotic with 6 pill left due to extreme vaginal itching  She began OTC vagisil this weekend without relief  Please advise

## 2022-08-08 NOTE — Clinical Note
I reinstructed today that she must continue it but she declines and states she will talk with urology tomorrow  Her friend Tere Sosa was present and is aware that she is declining the diflucan and antibiotic despite instruction that the infection may  return and her kidney and other organs may shut down      ----- Message -----  From: Alisha Santamaria DO  Sent: 8/8/2022   8:21 PM EDT  To: Yair Schmid RN, *      Please notify pt that she HAS to restart the antibiotic  She was hospitalized with a kidney infection and severe sepsis and only partially treating the infection can result in repeat hospitalization/worsening infection  I have sent a one time dose of fluconazole to help with vaginal symptoms  ----- Message -----  From: Yair Schmid RN  Sent: 8/8/2022   4:05 PM EDT  To: Alisha Santamaria DO    Call from pt stating she stopped her antibiotic with 6 pill left due to extreme vaginal itching  She began OTC vagisil this weekend without relief  Please advise

## 2022-08-09 ENCOUNTER — HOME CARE VISIT (OUTPATIENT)
Dept: HOME HEALTH SERVICES | Facility: HOME HEALTHCARE | Age: 57
End: 2022-08-09
Payer: MEDICARE

## 2022-08-09 VITALS
OXYGEN SATURATION: 97 % | SYSTOLIC BLOOD PRESSURE: 120 MMHG | DIASTOLIC BLOOD PRESSURE: 76 MMHG | TEMPERATURE: 97.1 F | RESPIRATION RATE: 18 BRPM | HEART RATE: 68 BPM

## 2022-08-09 PROCEDURE — G0299 HHS/HOSPICE OF RN EA 15 MIN: HCPCS

## 2022-08-09 NOTE — PROGRESS NOTES
8/10/2022    Mauricio Diaz  1965  [id number]        Assessment  -History of emphysematous pyelonephritis   -Hydronephrosis  -Nephrolithiasis     Discussion/Plan  Sharene Rater is a 64 y o  female who presents in consultation     1  History of emphysematous pyelonephritis, hydronephrosis, nephrolithiasis- return to interventional radiology for antegrade nephrostogram to evaluate for obstruction  If there is no obstruction on antegrade nephrostogram, plan for interventional radiology to remove left nephrostomy tube  If obstruction persists, patient will be scheduled for cystoscopy and ureteroscopy for direct visualization  Urine dip in the office today appears negative for infection  We will send for urine culture and call with her results  Reviewed nephrostomy care  Will send orders to [facility] to flush  Plan to discuss management of nonobstructing renal calculi after follow-up with nephrostomy tube  Call with results of nephrostogram and urine culture, and determine follow up thereafter  Patient was advised to call sooner with any questions or issues     -All questions answered, patients agree with plan     History of Present Illness  64 y o  female who presents in consultation today for evaluation of emphysematous pyelonephritis  Patient recently presented to [facility] on 7/27/2022 with sepsis  CT scan noted moderate left sided hydronephrosis with extensive gas consistent emphysematous pyelonephritis and nonobstructing renal calculi measuring 13 and 14mm  It was recommend to place left PCN for urgent decompression by interventional radiology  Patient has no complaints and denies any gross hematuria or flank pain  She states urine is draining clear and yellow  She is also voiding spontaneously from her urethra  Patient receives [facility] twice week, and states they have not received flushing orders  She denies any prior urologic history, surgical intervention, or instrumentation      Review of Systems  Review of Systems   Constitutional: Negative  HENT: Negative  Respiratory: Negative  Cardiovascular: Negative  Gastrointestinal: Negative  Genitourinary: Positive for difficulty urinating (left nephrostomy tube)  Negative for decreased urine volume, dysuria, flank pain, frequency, hematuria and urgency  Musculoskeletal: Negative  Skin: Negative  Neurological: Negative  Psychiatric/Behavioral: Negative  Past Medical History  Past Medical History:   Diagnosis Date    Circulation problem     Coronary artery disease     Depression     Diabetes ([facility] )     Dyspnea on exertion 2019    Glaucoma     Hearing problem     Heart attack ([facility] )     [facility]     Heart valve problem     High blood pressure     High cholesterol     Obesity, morbid ([place] [id number] ) 2021    Open injury of heart     Sepsis ([place] [address] ) 2022    Sleep apnea        Past Social History  Past Surgical History:   Procedure Laterality Date    CORONARY ARTERY BYPASS GRAFT      CORONARY STENT PLACEMENT  2010    CORONARY STENT PLACEMENT  2015    IR NEPHROSTOMY TUBE PLACEMENT  2022    VAGINAL DELIVERY  1987    VAGINAL DELIVERY         Past Family History  Family History   Problem Relation Age of Onset    Hypertension Father     Coronary artery disease Father    [place] Sudden death Father         cardiac 2014       Past Social history  Social History     Socioeconomic History    Marital status:      Spouse name: Not on file    Number of children: Not on file    Years of education: Not on file    Highest education level: Not on file   Occupational History    Not on file   Tobacco Use    Smoking status: Former Smoker     Types: Cigarettes     Quit date:      Years since quittin 6    Smokeless tobacco: Never Used   Vaping Use    Vaping Use: Never used   Substance and Sexual Activity    Alcohol use: Yes     Comment: social    Drug use:  Yes Types: Marijuana    Sexual activity: Not on file   Other Topics Concern    Not on file   Social History Narrative    Not on file     Social Determinants of Health     Financial Resource Strain: Not on file   Food Insecurity: No Food Insecurity    Worried About Running Out of Food in the Last Year: Never true    [place] of Food in the Last Year: Never true   Transportation Needs: No Transportation Needs    Lack of Transportation (Medical): No    Lack of Transportation (Non-Medical): No   Physical Activity: Not on file   Stress: Not on file   Social Connections: Not on file   Intimate Partner Violence: Not on file   Housing Stability: Low Risk     Unable to Pay for Housing in the Last Year: No    Number of Places Lived in the Last Year: 1    Unstable Housing in the Last Year: No       Current Medications  Current Outpatient Medications   Medication Sig Dispense Refill    aspirin (ECOTRIN LOW STRENGTH) 81 mg EC tablet Take 81 mg by mouth daily      atorvastatin (LIPITOR) 80 mg tablet take 1 tablet by mouth daily 90 tablet 1    carvedilol (COREG) 6 25 mg tablet Take 1 tablet (6 25 mg total) by mouth 2 (two) times a day with meals 180 tablet 0    clopidogrel (PLAVIX) 75 mg tablet TAKE 1 TABLET BY MOUTH EVERY DAY 90 tablet 1    escitalopram (LEXAPRO) 10 mg tablet Take 1 tablet (10 mg total) by mouth 2 (two) times a day 180 tablet 1    glipiZIDE (GLUCOTROL XL) 10 mg 24 hr tablet take 1 tablet by mouth daily 90 tablet 1    hydrochlorothiazide (HYDRODIURIL) 12 5 mg tablet Take 1 tablet (12 5 mg total) by mouth daily 90 tablet 1    losartan (COZAAR) 50 mg tablet take 1 tablet by mouth daily 90 tablet 1    MELATONIN PO Take 10 mg by mouth daily at bedtime      zolpidem (AMBIEN) 10 mg tablet TAKE 1 TABLET BY MOUTH DAILY AT BEDTIME AS NEEDED FOR SLEEP   30 tablet 1    ciprofloxacin (CIPRO) 500 mg tablet Take 1 tablet (500 mg total) by mouth every 12 (twelve) hours for 10 days (Patient not taking: No sig reported) 20 tablet 0     No current facility-administered medications for this visit  Allergies  Allergies   Allergen Reactions    Metformin Diarrhea    Penicillins Hives       Past medical history, social history, family history, medications and allergies were reviewed  Vitals  Vitals:    08/10/22 1042   BP: 122/78   BP Location: Left arm   Patient Position: Sitting   Cuff Size: Adult   Pulse: 93   SpO2: 98%   Weight: 81 7 kg (180 lb 3 2 oz)   Height: 5' 1" (1 549 m)       Physical Exam  Physical Exam  Constitutional:       Appearance: Normal appearance  She is well-developed  HENT:      Head: Normocephalic  Eyes:      Pupils: Pupils are equal, round, and reactive to light  Pulmonary:      Effort: Pulmonary effort is normal    Abdominal:      Palpations: Abdomen is soft  Tenderness: There is no right CVA tenderness or left CVA tenderness  Genitourinary:     Comments: Left nephrostomy tube in place draining clear, yellow, urine  Site clean, dry, and intact  Musculoskeletal:         General: Normal range of motion  Cervical back: Normal range of motion  Skin:     General: Skin is warm and dry  Neurological:      General: No focal deficit present  Mental Status: She is alert and oriented to person, place, and time  Psychiatric:         Mood and Affect: Mood normal          Behavior: Behavior normal          Thought Content:  Thought content normal          Judgment: Judgment normal          Results    I have personally reviewed all pertinent lab results and reviewed with patient  Lab Results   Component Value Date    CALCIUM 9 0 07/31/2022    K 3 4 (L) 07/31/2022    CO2 32 07/31/2022     07/31/2022    BUN 11 07/31/2022    CREATININE 0 81 07/31/2022     Lab Results   Component Value Date    WBC 7 93 07/31/2022    HGB 11 5 07/31/2022    HCT 33 7 (L) 07/31/2022    MCV 88 07/31/2022     07/31/2022     Recent Results (from the past 1 hour(s))   POCT urine dip Collection Time: 08/10/22 11:04 AM   Result Value Ref Range    LEUKOCYTE ESTERASE,UA trace     NITRITE,UA neg     SL AMB POCT UROBILINOGEN 0 2     POCT URINE PROTEIN 100++      PH,UA 5 0     BLOOD,UA neg     SPECIFIC GRAVITY,UA 1 015     KETONES,UA neg     BILIRUBIN,UA neg     GLUCOSE, UA 2,000      COLOR,UA yellow     CLARITY,UA clear

## 2022-08-09 NOTE — CASE COMMUNICATION
Ship to pt    Insurance mc  Drain sponges 4x4 split [id number]   Valerie Rico [id number] [facility]  2in [id number]     2

## 2022-08-10 ENCOUNTER — OFFICE VISIT (OUTPATIENT)
Dept: UROLOGY | Facility: HOSPITAL | Age: 57
End: 2022-08-10
Payer: MEDICARE

## 2022-08-10 VITALS
WEIGHT: 180.2 LBS | HEART RATE: 93 BPM | DIASTOLIC BLOOD PRESSURE: 78 MMHG | OXYGEN SATURATION: 98 % | BODY MASS INDEX: 34.02 KG/M2 | HEIGHT: 61 IN | SYSTOLIC BLOOD PRESSURE: 122 MMHG

## 2022-08-10 DIAGNOSIS — N12 EMPHYSEMATOUS PYELONEPHRITIS: Primary | ICD-10-CM

## 2022-08-10 LAB
SL AMB  POCT GLUCOSE, UA: 2000
SL AMB LEUKOCYTE ESTERASE,UA: NORMAL
SL AMB POCT BILIRUBIN,UA: NORMAL
SL AMB POCT BLOOD,UA: NORMAL
SL AMB POCT CLARITY,UA: CLEAR
SL AMB POCT COLOR,UA: YELLOW
SL AMB POCT KETONES,UA: NORMAL
SL AMB POCT NITRITE,UA: NORMAL
SL AMB POCT PH,UA: 5
SL AMB POCT SPECIFIC GRAVITY,UA: 1.01
SL AMB POCT URINE PROTEIN: NORMAL
SL AMB POCT UROBILINOGEN: 0.2

## 2022-08-10 PROCEDURE — 10330064 TAPE, ADHSV TRANSPORE WHT 2" (6RL/BX 10B

## 2022-08-10 PROCEDURE — 99213 OFFICE O/P EST LOW 20 MIN: CPT | Performed by: NURSE PRACTITIONER

## 2022-08-10 PROCEDURE — 81002 URINALYSIS NONAUTO W/O SCOPE: CPT | Performed by: NURSE PRACTITIONER

## 2022-08-10 PROCEDURE — 87086 URINE CULTURE/COLONY COUNT: CPT | Performed by: NURSE PRACTITIONER

## 2022-08-10 PROCEDURE — 10330064 SPONGE, EXCILON SPLIT DRN STR 4"X4" (2/P

## 2022-08-10 NOTE — CASE COMMUNICATION
Pt reports the itching and burning is down form a  12 to  an 8   will call the office if the condition is not improving by the end of the week

## 2022-08-12 ENCOUNTER — HOME CARE VISIT (OUTPATIENT)
Dept: HOME HEALTH SERVICES | Facility: HOME HEALTHCARE | Age: 57
End: 2022-08-12
Payer: MEDICARE

## 2022-08-12 VITALS
SYSTOLIC BLOOD PRESSURE: 120 MMHG | DIASTOLIC BLOOD PRESSURE: 76 MMHG | RESPIRATION RATE: 18 BRPM | OXYGEN SATURATION: 96 % | HEART RATE: 70 BPM | TEMPERATURE: 96.1 F

## 2022-08-12 LAB — BACTERIA UR CULT: ABNORMAL

## 2022-08-12 PROCEDURE — G0299 HHS/HOSPICE OF RN EA 15 MIN: HCPCS

## 2022-08-12 NOTE — PROGRESS NOTES
Cardiology   Follow Up   Office Visit Note  Earline Jurado   64 y o    female   MRN: [id number]  [facility]  [address] [facility]  [place] [address] [facility] [address]  [phone]    PCP: JEANCARLOS Mcintosh  Cardiologist: Dr Jeremie Torres           Summary of recommendations  Heart healthy diet  Educational information provided  Stop atorvastatin; Start rosuvastatin  Reassess lipids 8 weeks  consider adding zetia 10 mg/d if LDL above goal-she decline adding a new med today,as she is trying to keep her pill burden down  Will get an updated echocardiogram  Follow up will be scheduled with Dr Jeremie Torres 3 months  Discuss potential discontinuation of DAPT        Assessment/plan  CAD,   · History left circumflex stent 2011-[facility] @ 54 yo  · S/P CABG x 2 2012, LIMA- LAD, SVG-RCA (@ 51 yo)-[facility]  · S/P GWYN to vein graft to RCA 2015- [facility]  · on aspirin 81, Plavix, high-intensity statin, beta-blocker  Hypertension, essential   /68 -on losartan 50 mg daily, carvedilol 6 25 mg b i d , HCTZ 12 5 mg daily  Hyperlipidemia, on atorvastatin 80 mg daily  6/3/22 LDL 99  Non [id number]  Her LDL is above goal   Given her diabetes mellitus, and recurrent CAD, she would benefit from an LDL around 55  This is consistent with ESC 2019 guidelines  --> after shared decision-making, will stop atorvastatin  Will start rosuvastatin 40 mg daily  Will reassess her lipids 8 weeks  She will then reconvene with her cardiologist and discuss adding Zetia 10 mg daily  She will also discuss DA PT  perhaps Plavix can be DCd  Type 2 diabetes mellitus  6/3/22 Hemoglobin A1c 7 3, prior 8 2  Palpitations  Obstructive sleep apnea  Intermittently uses CPAP  Needs equipment adjusted  Obesity, BMI 34  Cardiac testing   TTE  11/26/19 EF 60%  No RWMA  Mild LAE  Mild MR  Mild TR    NM myocardial SPECT with exercise    11/26/19  Stress results: Duration of exercise was 6 min and 45 sec, achieving 81% maximum predicted heart rate and 9 3 Mets of activity    Target heart rate was not achieved  The patient experienced chest pain during stress; pain resolved spontaneously  The stress ECG was negative for ischemia and normal  There was a small to moderate, moderately severe, fixed myocardial perfusion defect of the inferolateral wall : The calculated left ventricular ejection fraction was 67 %  Left ventricular ejection fraction was within normal limits by visual estimate  There was no left ventricular regional abnormality  No ischemia             HPI  Abdullahi Beckford is a 51-year-old female with history of CAD, prior CABG, coronary artery stenting, diabetes mellitus, essential hypertension, hyperlipidemia, and obstructive sleep apnea  She had a drug-eluting stent placed to her left circumflex in January 2011  Subsequently, in 2012 she  underwent CABG x2, with the LIMA to the LAD, vein graft to the RCA  This was done at [facility]   She then followed with [facility]  In 2015 a left heart catheterization showed critical stenosis in the distal portion of her vein graft to the RCA  This was treated successfully with a GWYN  She has had preserved LV function without significant valve disease  She does have obstructive sleep apnea, for which she intermittently uses CPAP    She established cardiology care with Dr Saurav Mckinnon in 2019  She expressed increasing fatigue shortness of breath and exercise intolerance  Of note she was not using CPAP at the time given a recent move from [facility] [place] nuclear study showed a fixed inferior defect, either an old infarct versus attenuation artifact  Per Dr Rhonda Pedersen, no ischemia seen  The echocardiogram showed normal LV function, without significant valve disease  She was able to restart her CPAP and did feel somewhat better but noted she wanted to follow-up with sleep medicine    She was expressing some intermittent palpitations a few times a year more random, and self-limiting  Carvedilol was going to be in switched to metoprolol to see if this improved, and HCTZ was going to be discontinued  If not, a monitor would be pursued  She was last seen in the office 9/8/20  Recommend follow-up in 6 months     7/26/22  Phone call from pt for refill on carvedilol  Apparently patient was taking carvedilol once a day and should be twice a day  Patient not seen since 2020  Advised an office visit      Adm 727-7/31/22  SLUB  CC: 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  Underwent PCN placement by IR  Discharged on ABX  Initially, her antihypertensives were held the setting of hypertension/sepsis  Resumed on HCTZ 12 5 mg/d,losartan 50 mg/d, and carvedilol 6 25 BID      8/16/22  Routine cardiology follow-up  I am seeing her in lieu of her cardiologist, given scheduling constraints  She is in need of a refill on her carvedilol  She has been taking it twice daily  She is also adherence her other medications  She had recent surgery as above  She is a recovering  She is hopeful though remove her nephrostomy tube in the near future  She denies chest pain shortness of breath lightheadedness or dizziness  Prior to her urological issues she had been exercising/walking routinely  She has gotten away from that  She denies palpitations  We reviewed her cardiac history  She is very high risk:  Diabetes mellitus, recurrent CAD, beginning at a very young age  Her exam is unremarkable   Blood pressure is excellent, 122/68  Heart rate stable/regular at 66   We discussed her CV factors   We discussed the need for aggressive lipid management  Currently she is on atorvastatin 80 mg daily  That translates into a 55% reduction on her LDL  By switching to rosuvastatin 40, we could get a 62% reduction, on average  The patient prefers to switch today, to try to minimize her pill burden    We did discuss the likely need to add a medication such as ezetimibe  In addition, her last PCI was in 2015  She is still on DAPT  She will reconvene with her cardiologist in 3 months  She will discuss her lipid therapy as well as her antiplatelet therapy        I have spent 25 minutes with Patient  today in which greater than 50% of this time was spent in counseling/coordination of care regarding Intructions for management, Patient and family education, Importance of tx compliance and Risk factor reductions  Assessment  Diagnoses and all orders for this visit:    Coronary artery disease involving coronary bypass graft of native heart without angina pectoris  -     POCT ECG  -     Echo complete w/ contrast if indicated; Future    S/P CABG x 2  -     POCT ECG    S/P insertion of non-drug eluting coronary artery stent  -     POCT ECG    Primary hypertension  -     Echo complete w/ contrast if indicated; Future    Mixed hyperlipidemia  -     rosuvastatin (CRESTOR) 40 MG tablet; Take 1 tablet (40 mg total) by mouth daily  -     Lipid panel; Future    GABI (obstructive sleep apnea)  -     Echo complete w/ contrast if indicated; Future    Essential hypertension  -     carvedilol (COREG) 6 25 mg tablet; Take 1 tablet (6 25 mg total) by mouth 2 (two) times a day with meals          Past Medical History:   Diagnosis Date    Circulation problem     Coronary artery disease     Depression     Diabetes ([address] )     Dyspnea on exertion 11/12/2019    Glaucoma     Hearing problem     [facility]     Heart failure ([address] )     Heart valve problem     High blood pressure     High cholesterol     Obesity, morbid ([address] ) 1/25/2021    Open injury of heart     Sepsis ([facility] ) 7/27/2022    Sleep apnea        Review of Systems   Constitutional: Negative for chills  Cardiovascular: Negative for chest pain, claudication, cyanosis, dyspnea on exertion, irregular heartbeat, leg swelling, near-syncope, orthopnea, palpitations, paroxysmal nocturnal dyspnea and syncope     Respiratory: Negative for cough and shortness of breath  Gastrointestinal: Negative for heartburn and nausea  Neurological: Negative for dizziness, focal weakness, headaches, light-headedness and weakness  All other systems reviewed and are negative  Allergies   Allergen Reactions    Metformin Diarrhea    Penicillins Hives       Current Outpatient Medications:     aspirin (ECOTRIN LOW STRENGTH) 81 mg EC tablet, Take 81 mg by mouth daily, Disp: , Rfl:     carvedilol (COREG) 6 25 mg tablet, Take 1 tablet (6 25 mg total) by mouth 2 (two) times a day with meals, Disp: 180 tablet, Rfl: 3    clopidogrel (PLAVIX) 75 mg tablet, TAKE 1 TABLET BY MOUTH EVERY DAY, Disp: 90 tablet, Rfl: 1    escitalopram (LEXAPRO) 10 mg tablet, Take 1 tablet (10 mg total) by mouth 2 (two) times a day, Disp: 180 tablet, Rfl: 1    glipiZIDE (GLUCOTROL XL) 10 mg 24 hr tablet, take 1 tablet by mouth daily, Disp: 90 tablet, Rfl: 1    hydrochlorothiazide (HYDRODIURIL) 12 5 mg tablet, Take 1 tablet (12 5 mg total) by mouth daily, Disp: 90 tablet, Rfl: 1    losartan (COZAAR) 50 mg tablet, take 1 tablet by mouth daily, Disp: 90 tablet, Rfl: 1    MELATONIN PO, Take 10 mg by mouth daily at bedtime, Disp: , Rfl:     rosuvastatin (CRESTOR) 40 MG tablet, Take 1 tablet (40 mg total) by mouth daily, Disp: 90 tablet, Rfl: 3    zolpidem (AMBIEN) 10 mg tablet, TAKE 1 TABLET BY MOUTH DAILY AT BEDTIME AS NEEDED FOR SLEEP , Disp: 30 tablet, Rfl: 1        Social History     Socioeconomic History    Marital status:      Spouse name: Not on file    Number of children: Not on file    Years of education: Not on file    Highest education level: Not on file   Occupational History    Not on file   Tobacco Use    Smoking status: Former Smoker     Types: Cigarettes     Quit date:      Years since quittin 6    Smokeless tobacco: Never Used   Vaping Use    Vaping Use: Never used   Substance and Sexual Activity    Alcohol use:  Yes Comment: social    Drug use: Yes     Types: Marijuana    Sexual activity: Not on file   Other Topics Concern    Not on file   Social History Narrative    Not on file     Social Determinants of Health     Financial Resource Strain: Not on file   Food Insecurity: No Food Insecurity    Worried About Running Out of Food in the Last Year: Never true    Gael of Food in the Last Year: Never true   Transportation Needs: No Transportation Needs    Lack of Transportation (Medical): No    Lack of Transportation (Non-Medical): No   Physical Activity: Not on file   Stress: Not on file   Social Connections: Not on file   Intimate Partner Violence: Not on file   Housing Stability: Low Risk     Unable to Pay for Housing in the Last Year: No    Number of Places Lived in the Last Year: 1    Unstable Housing in the Last Year: No       Family History   Problem Relation Age of Onset    Hypertension Father     Coronary artery disease Father     Sudden death Father         cardiac 2014       Physical Exam  Vitals and nursing note reviewed  Constitutional:       General: She is not in acute distress  Appearance: She is obese  She is not diaphoretic  HENT:      Head: Normocephalic and atraumatic  Eyes:      Conjunctiva/sclera: Conjunctivae normal    Cardiovascular:      Rate and Rhythm: Normal rate and regular rhythm  Pulses: Intact distal pulses  Heart sounds: Normal heart sounds  Pulmonary:      Effort: Pulmonary effort is normal       Breath sounds: Normal breath sounds  Abdominal:      General: Bowel sounds are normal       Palpations: Abdomen is soft  Musculoskeletal:         General: Normal range of motion  Cervical back: Normal range of motion and neck supple  Skin:     General: Skin is warm and dry  Neurological:      Mental Status: She is alert and oriented to person, place, and time           Vitals: Blood pressure 122/68, pulse 66, height 5' 1" (1 549 m), weight 82 3 kg (181 lb 6 4 oz)  Wt Readings from Last 3 Encounters:   22 82 3 kg (181 lb 6 4 oz)   08/10/22 81 7 kg (180 lb 3 2 oz)   22 81 6 kg (180 lb)         Labs & Results:  Lab Results   Component Value Date    WBC 7 93 2022    HGB 11 5 2022    HCT 33 7 (L) 2022    MCV 88 2022     2022     No results found for: BNP  No components found for: CHEM  Troponin I   Date Value Ref Range Status   2019 <0 02 <=0 04 ng/mL Final     Comment:     3Autovalidation override  [employer] Chemistry analyzer 99% cutoff is > 0 04 ng/mL in network labs     o cTnI 99% cutoff is useful only when applied to patients in the clinical setting of myocardial ischemia   o cTnI 99% cutoff should be interpreted in the context of clinical history, ECG findings and possibly cardiac imaging to establish correct diagnosis  o cTnI 99% cutoff may be suggestive but clearly not indicative of a coronary event without the clinical setting of myocardial ischemia         Results for orders placed during the hospital encounter of 19    Echo complete with contrast if indicated    Narrative  [facility] [employer] [address]    Transthoracic Echocardiogram  2D, M-mode, Doppler, and Color Doppler    Study date:  2019    Patient: Sanya Beckford  MR number: [id number]  Account number: [de-identified]  : 1965  Age: 47 years  Gender: Female  Status: Outpatient  Location: [address] [place] [address] and [facility]  Height: 61 in  Weight: 192 5 lb  BP: 144/ 86 mmHg    Indications: CAD; CABG; Stent    Diagnoses: I25 10 - Atherosclerotic heart disease of native coronary artery without angina pectoris, I25 810 - Atherosclerosis of coronary artery bypass graft(s) without angina pectoris, Z95 5 - Presence of coronary angioplasty implant and  graft    Sonographer:  CORKY Barbour  Primary Physician:  Skyler Lane  Referring Physician:  Alexandria Campbell MD  Group:    [facility]  Interpreting Physician:  Gable Leventhal, MD    SUMMARY    PROCEDURE INFORMATION:  This was a technically difficult study  LEFT VENTRICLE:  Systolic function was normal by visual assessment  Ejection fraction was estimated to be 60 %  Although no diagnostic regional wall motion abnormality was identified, this possibility cannot be completely excluded on the basis of this study  LEFT ATRIUM:  The atrium was mildly dilated  MITRAL VALVE:  There was mild regurgitation  TRICUSPID VALVE:  There was mild regurgitation  PULMONIC VALVE:  There was trace regurgitation  HISTORY: PRIOR HISTORY: CAD; HTN; HLD; CABGx2; Stent; COWAN; DM; MI; HF; Sleep apnea; Heart valve problem; F  smoker    PROCEDURE: The study was performed in the [facility]  This was a routine study  The transthoracic approach was used  The study included complete 2D imaging, M-mode, complete spectral Doppler, and color Doppler  Echocardiographic views were limited due to poor patient compliance, poor acoustic window availability, decreased penetration, and lung interference  This was a technically difficult study  LEFT VENTRICLE: Size was normal  Systolic function was normal by visual assessment  Ejection fraction was estimated to be 60 %  Although no diagnostic regional wall motion abnormality was identified, this possibility cannot be completely  excluded on the basis of this study  Wall thickness was normal  DOPPLER: The ratio of early ventricular filling to atrial contraction velocities was within the normal range  Left ventricular diastolic function parameters were normal     RIGHT VENTRICLE: The size was normal  Systolic function was normal  DOPPLER: Systolic pressure was within the normal range  Estimated peak pressure was 30 mmHg  LEFT ATRIUM: The atrium was mildly dilated      RIGHT ATRIUM: Size was normal     MITRAL VALVE: Valve structure was normal  There was normal leaflet separation  DOPPLER: The transmitral velocity was within the normal range  There was no evidence for stenosis  There was mild regurgitation  AORTIC VALVE: The valve was trileaflet  Leaflets exhibited normal thickness and normal cuspal separation  DOPPLER: Transaortic velocity was within the normal range  There was no evidence for stenosis  There was no regurgitation  TRICUSPID VALVE: The valve structure was normal  There was normal leaflet separation  DOPPLER: The transtricuspid velocity was within the normal range  There was no evidence for stenosis  There was mild regurgitation  PULMONIC VALVE: Leaflets exhibited normal thickness, no calcification, and normal cuspal separation  DOPPLER: The transpulmonic velocity was within the normal range  There was trace regurgitation  PERICARDIUM: There was no pericardial effusion  AORTA: The root exhibited normal size  SYSTEMIC VEINS: IVC: The inferior vena cava was normal in size and course   Respirophasic changes were normal     SYSTEM MEASUREMENT TABLES    2D  %FS: 31 31 %  Ao Diam: 2 34 cm  EDV(Teich): 66 44 ml  EF(Teich): 59 78 %  ESV(Cube): 19 42 ml  ESV(Teich): 26 72 ml  IVSd: 0 85 cm  LA Area: 23 76 cm2  LA Diam: 3 38 cm  LVEDV MOD A4C: 147 9 ml  LVEF MOD A4C: 74 82 %  LVESV MOD A4C: 37 24 ml  LVIDd: 3 91 cm  LVIDs: 2 69 cm  LVLd A4C: 7 76 cm  LVLs A4C: 6 46 cm  LVPWd: 0 89 cm  RA Area: 13 7 cm2  RV Diam : 3 47 cm  SI(Cube): 21 77 ml/m2  SI(Teich): 21 36 ml/m2  SV MOD A4C: 110 65 ml  SV(Cube): 40 5 ml  SV(Teich): 39 72 ml    CW  RAP: 10 mmHg  TR Vmax: 2 86 m/s  TR maxP 64 mmHg    MM  TAPSE: 1 95 cm    PW  E': 0 09 m/s  E/E': 11 03  MV A Manuel: 0 82 m/s  MV Dec [place]: 6 43 m/s2  MV DecT: 153 22 ms  MV E Manuel: 0 99 m/s  MV E/A Ratio: 1 21  RVSP: 42 64 mmHg    [employer] Accredited Echocardiography Laboratory    Prepared and electronically signed by    Sudheer Oviedo MD  Signed [id number] 15:48:21    No results found for this or any previous visit  This note was completed in part utilizing m-modal fluency direct voice recognition software  Grammatical errors, random word insertion, spelling mistakes, and incomplete sentences may be an occasional consequence of the system secondary to software limitations, ambient noise and hardware issues  At the time of dictation, efforts were made to edit, clarify and /or correct errors  Please read the chart carefully and recognize, using context, where substitutions have occurred    If you have any questions or concerns about the context, text or information contained within the body of this dictation, please contact myself, the provider, for further clarification

## 2022-08-15 ENCOUNTER — HOME CARE VISIT (OUTPATIENT)
Dept: HOME HEALTH SERVICES | Facility: HOME HEALTHCARE | Age: 57
End: 2022-08-15
Payer: MEDICARE

## 2022-08-16 ENCOUNTER — OFFICE VISIT (OUTPATIENT)
Dept: CARDIOLOGY CLINIC | Facility: CLINIC | Age: 57
End: 2022-08-16
Payer: MEDICARE

## 2022-08-16 VITALS
WEIGHT: 181.4 LBS | DIASTOLIC BLOOD PRESSURE: 68 MMHG | BODY MASS INDEX: 34.25 KG/M2 | HEIGHT: 61 IN | SYSTOLIC BLOOD PRESSURE: 122 MMHG | HEART RATE: 66 BPM

## 2022-08-16 DIAGNOSIS — Z95.1 S/P CABG X 2: ICD-10-CM

## 2022-08-16 DIAGNOSIS — I10 ESSENTIAL HYPERTENSION: ICD-10-CM

## 2022-08-16 DIAGNOSIS — I25.810 CORONARY ARTERY DISEASE INVOLVING CORONARY BYPASS GRAFT OF NATIVE HEART WITHOUT ANGINA PECTORIS: Primary | ICD-10-CM

## 2022-08-16 DIAGNOSIS — Z95.5 S/P INSERTION OF NON-DRUG ELUTING CORONARY ARTERY STENT: ICD-10-CM

## 2022-08-16 DIAGNOSIS — E78.2 MIXED HYPERLIPIDEMIA: ICD-10-CM

## 2022-08-16 DIAGNOSIS — I10 PRIMARY HYPERTENSION: ICD-10-CM

## 2022-08-16 DIAGNOSIS — G47.33 OSA (OBSTRUCTIVE SLEEP APNEA): ICD-10-CM

## 2022-08-16 PROCEDURE — 93000 ELECTROCARDIOGRAM COMPLETE: CPT | Performed by: NURSE PRACTITIONER

## 2022-08-16 PROCEDURE — 99215 OFFICE O/P EST HI 40 MIN: CPT | Performed by: NURSE PRACTITIONER

## 2022-08-16 RX ORDER — CARVEDILOL 6.25 MG/1
6.25 TABLET ORAL 2 TIMES DAILY WITH MEALS
Qty: 180 TABLET | Refills: 3 | Status: SHIPPED | OUTPATIENT
Start: 2022-08-16

## 2022-08-16 RX ORDER — ROSUVASTATIN CALCIUM 40 MG/1
40 TABLET, COATED ORAL DAILY
Qty: 90 TABLET | Refills: 3 | Status: SHIPPED | OUTPATIENT
Start: 2022-08-16

## 2022-08-16 NOTE — PATIENT INSTRUCTIONS

## 2022-08-16 NOTE — LETTER
August 16, 2022     [address]  [address] [facility]  [address] [address] [address]    Patient: Ladi Garcia   YOB: 1965   Date of Visit: 8/16/2022       Dear Dr Yadi Brown: Thank you for referring Kajal Wilkins to me for evaluation  Below are my notes for this consultation  If you have questions, please do not hesitate to call me  I look forward to following your patient along with you  Sincerely,        JEANCARLOS Childers        CC: MD Kassie Kebede, [place]  8/16/2022 10:58 AM  Sign when Signing Visit  Cardiology   Follow Up   Office Visit Note  Ladi Garcia   64 y o    female   MRN: [id number]  [place] [id number] [address]  [facility] [phone]    PCP: JEANCARLOS Gary  Cardiologist: Dr Emmie Sewell           Summary of recommendations  Heart healthy diet  Educational information provided  Stop atorvastatin; Start rosuvastatin  Reassess lipids 8 weeks  consider adding zetia 10 mg/d if LDL above goal-she decline adding a new med today,as she is trying to keep her pill burden down  Will get an updated echocardiogram  Follow up will be scheduled with Dr Emmie Sewell 3 months  Discuss potential discontinuation of DAPT        Assessment/plan  CAD,   · History left circumflex stent 2011-[facility] @ 56 yo  · S/P CABG x 2 2012, LIMA- LAD, SVG-RCA (@ 53 yo)-[facility]  · S/P GWYN to vein graft to RCA 2015- [facility]  · on aspirin 81, Plavix, high-intensity statin, beta-blocker  Hypertension, essential   /68 -on losartan 50 mg daily, carvedilol 6 25 mg b i d , HCTZ 12 5 mg daily  Hyperlipidemia, on atorvastatin 80 mg daily  6/3/22 LDL 99  Non [id number]  Her LDL is above goal   Given her diabetes mellitus, and recurrent CAD, she would benefit from an LDL around 55  This is consistent with ESC 2019 guidelines  --> after shared decision-making, will stop atorvastatin    Will start rosuvastatin 40 mg daily  Will reassess her lipids 8 weeks  She will then reconvene with her cardiologist and discuss adding Zetia 10 mg daily  She will also discuss DA PT  perhaps Plavix can be DCd  Type 2 diabetes mellitus  6/3/22 Hemoglobin A1c 7 3, prior 8 2  Palpitations  Obstructive sleep apnea  Intermittently uses CPAP  Needs equipment adjusted  Obesity, BMI 34  Cardiac testing   TTE  11/26/19 EF 60%  No RWMA  Mild LAE  Mild MR  Mild TR    NM myocardial SPECT with exercise  11/26/19  Stress results: Duration of exercise was 6 min and 45 sec, achieving 81% maximum predicted heart rate and 9 3 Mets of activity    Target heart rate was not achieved  The patient experienced chest pain during stress; pain resolved spontaneously  The stress ECG was negative for ischemia and normal  There was a small to moderate, moderately severe, fixed myocardial perfusion defect of the inferolateral wall : The calculated left ventricular ejection fraction was 67 %  Left ventricular ejection fraction was within normal limits by visual estimate  There was no left ventricular regional abnormality  No ischemia             HPI  Joselo Davidson is a 68-year-old female with history of CAD, prior CABG, coronary artery stenting, diabetes mellitus, essential hypertension, hyperlipidemia, and obstructive sleep apnea  She had a drug-eluting stent placed to her left circumflex in January 2011  Subsequently, in 2012 she  underwent CABG x2, with the LIMA to the LAD, vein graft to the RCA  This was done at [facility]   She then followed with [facility]  In 2015 a left heart catheterization showed critical stenosis in the distal portion of her vein graft to the RCA  This was treated successfully with a GWYN  She has had preserved LV function without significant valve disease  She does have obstructive sleep apnea, for which she intermittently uses CPAP    She established cardiology care with Dr Candelaria Segura in 2019    She expressed increasing fatigue shortness of breath and exercise intolerance  Of note she was not using CPAP at the time given a recent move from [address] [place] [address] nuclear study showed a fixed inferior defect, either an old infarct versus attenuation artifact  Per Dr Deidra Clayton, no ischemia seen  The echocardiogram showed normal LV function, without significant valve disease  She was able to restart her CPAP and did feel somewhat better but noted she wanted to follow-up with sleep medicine  She was expressing some intermittent palpitations a few times a year more random, and self-limiting  Carvedilol was going to be in switched to metoprolol to see if this improved, and HCTZ was going to be discontinued  If not, a monitor would be pursued  She was last seen in the office 9/8/20  Recommend follow-up in 6 months     7/26/22  Phone call from pt for refill on carvedilol  Apparently patient was taking carvedilol once a day and should be twice a day  Patient not seen since 2020  Advised an office visit      Adm 727-7/31/22  SLUB  CC: 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  Underwent PCN placement by IR  Discharged on ABX  Initially, her antihypertensives were held the setting of hypertension/sepsis  Resumed on HCTZ 12 5 mg/d,losartan 50 mg/d, and carvedilol 6 25 BID      8/16/22  Routine cardiology follow-up  I am seeing her in lieu of her cardiologist, given scheduling constraints  She is in need of a refill on her carvedilol  She has been taking it twice daily  She is also adherence her other medications  She had recent surgery as above  She is a recovering  She is hopeful though remove her nephrostomy tube in the near future  She denies chest pain shortness of breath lightheadedness or dizziness  Prior to her urological issues she had been exercising/walking routinely  She has gotten away from that  She denies palpitations  We reviewed her cardiac history    She is very high risk: Diabetes mellitus, recurrent CAD, beginning at a very young age  Her exam is unremarkable   Blood pressure is excellent, 122/68  Heart rate stable/regular at 66   We discussed her CV factors   We discussed the need for aggressive lipid management  Currently she is on atorvastatin 80 mg daily  That translates into a 55% reduction on her LDL  By switching to rosuvastatin 40, we could get a 62% reduction, on average  The patient prefers to switch today, to try to minimize her pill burden  We did discuss the likely need to add a medication such as ezetimibe  In addition, her last PCI was in 2015  She is still on DAPT  She will reconvene with her cardiologist in 3 months  She will discuss her lipid therapy as well as her antiplatelet therapy        I have spent 25 minutes with Patient  today in which greater than 50% of this time was spent in counseling/coordination of care regarding Intructions for management, Patient and family education, Importance of tx compliance and Risk factor reductions  Assessment  Diagnoses and all orders for this visit:    Coronary artery disease involving coronary bypass graft of native heart without angina pectoris  -     POCT ECG  -     Echo complete w/ contrast if indicated; Future    S/P CABG x 2  -     POCT ECG    S/P insertion of non-drug eluting coronary artery stent  -     POCT ECG    Primary hypertension  -     Echo complete w/ contrast if indicated; Future    Mixed hyperlipidemia  -     rosuvastatin (CRESTOR) 40 MG tablet; Take 1 tablet (40 mg total) by mouth daily  -     Lipid panel; Future    GABI (obstructive sleep apnea)  -     Echo complete w/ contrast if indicated; Future    Essential hypertension  -     carvedilol (COREG) 6 25 mg tablet;  Take 1 tablet (6 25 mg total) by mouth 2 (two) times a day with meals          Past Medical History:   Diagnosis Date    Circulation problem     Coronary artery disease     Depression     Diabetes (HCC)     Dyspnea on exertion 11/12/2019    Glaucoma     Hearing problem     Heart attack ([place] [address] )     Heart failure ([place] [address] )     Heart valve problem     High blood pressure     High cholesterol     Obesity, morbid ([place] [address] ) 1/25/2021    Open injury of heart     Sepsis ([place] [address] ) 7/27/2022    Sleep apnea        Review of Systems   Constitutional: Negative for chills  Cardiovascular: Negative for chest pain, claudication, cyanosis, dyspnea on exertion, irregular heartbeat, leg swelling, near-syncope, orthopnea, palpitations, paroxysmal nocturnal dyspnea and syncope  Respiratory: Negative for cough and shortness of breath  Gastrointestinal: Negative for heartburn and nausea  Neurological: Negative for dizziness, focal weakness, headaches, light-headedness and weakness  All other systems reviewed and are negative  Allergies   Allergen Reactions    Metformin Diarrhea    Penicillins Hives           Current Outpatient Medications:     aspirin (ECOTRIN LOW STRENGTH) 81 mg EC tablet, Take 81 mg by mouth daily, Disp: , Rfl:     carvedilol (COREG) 6 25 mg tablet, Take 1 tablet (6 25 mg total) by mouth 2 (two) times a day with meals, Disp: 180 tablet, Rfl: 3    clopidogrel (PLAVIX) 75 mg tablet, TAKE 1 TABLET BY MOUTH EVERY DAY, Disp: 90 tablet, Rfl: 1    escitalopram (LEXAPRO) 10 mg tablet, Take 1 tablet (10 mg total) by mouth 2 (two) times a day, Disp: 180 tablet, Rfl: 1    glipiZIDE (GLUCOTROL XL) 10 mg 24 hr tablet, take 1 tablet by mouth daily, Disp: 90 tablet, Rfl: 1    hydrochlorothiazide (HYDRODIURIL) 12 5 mg tablet, Take 1 tablet (12 5 mg total) by mouth daily, Disp: 90 tablet, Rfl: 1    losartan (COZAAR) 50 mg tablet, take 1 tablet by mouth daily, Disp: 90 tablet, Rfl: 1    MELATONIN PO, Take 10 mg by mouth daily at bedtime, Disp: , Rfl:     rosuvastatin (CRESTOR) 40 MG tablet, Take 1 tablet (40 mg total) by mouth daily, Disp: 90 tablet, Rfl: 3    zolpidem (AMBIEN) 10 mg tablet, TAKE 1 TABLET BY MOUTH DAILY AT BEDTIME AS NEEDED FOR SLEEP , Disp: 30 tablet, Rfl: 1        Social History     Socioeconomic History    Marital status:      Spouse name: Not on file    Number of children: Not on file    Years of education: Not on file    Highest education level: Not on file   Occupational History    Not on file   Tobacco Use    Smoking status: Former Smoker     Types: Cigarettes     Quit date:      Years since quittin 6    Smokeless tobacco: Never Used   Vaping Use    Vaping Use: Never used   Substance and Sexual Activity    Alcohol use: Yes     Comment: social    Drug use: Yes     Types: Marijuana    Sexual activity: Not on file   Other Topics Concern    Not on file   Social History Narrative    Not on file     Social Determinants of Health     Financial Resource Strain: Not on file   Food Insecurity: No Food Insecurity    Worried About [address] in the Last Year: Never true    [address] in the Last Year: Never true   Transportation Needs: No Transportation Needs    Lack of Transportation (Medical): No    Lack of Transportation (Non-Medical): No   Physical Activity: Not on file   Stress: Not on file   Social Connections: Not on file   Intimate Partner Violence: Not on file   Housing Stability: Low Risk     Unable to Pay for Housing in the Last Year: No    Number of Places Lived in the Last Year: 1    Unstable Housing in the Last Year: No       Family History   Problem Relation Age of Onset    Hypertension Father     Coronary artery disease Father     Sudden death Father         cardiac 2014       Physical Exam  Vitals and nursing note reviewed  Constitutional:       General: She is not in acute distress  Appearance: She is obese  She is not diaphoretic  HENT:      Head: Normocephalic and atraumatic  Eyes:      Conjunctiva/sclera: Conjunctivae normal    Cardiovascular:      Rate and Rhythm: Normal rate and regular rhythm  Pulses: Intact distal pulses        Heart sounds: Normal heart sounds  Pulmonary:      Effort: Pulmonary effort is normal       Breath sounds: Normal breath sounds  Abdominal:      General: Bowel sounds are normal       Palpations: Abdomen is soft  Musculoskeletal:         General: Normal range of motion  Cervical back: Normal range of motion and neck supple  Skin:     General: Skin is warm and dry  Neurological:      Mental Status: She is alert and oriented to person, place, and time  Vitals: Blood pressure 122/68, pulse 66, height 5' 1" (1 549 m), weight 82 3 kg (181 lb 6 4 oz)  Wt Readings from Last 3 Encounters:   22 82 3 kg (181 lb 6 4 oz)   08/10/22 81 7 kg (180 lb 3 2 oz)   22 81 6 kg (180 lb)         Labs & Results:  Lab Results   Component Value Date    WBC 7 93 2022    HGB 11 5 2022    HCT 33 7 (L) 2022    MCV 88 2022     2022     No results found for: BNP  No components found for: CHEM  Troponin I   Date Value Ref Range Status   2019 <0 02 <=0 04 ng/mL Final     Comment:     3Autovalidation override  [employer] Chemistry analyzer 99% cutoff is > 0 04 ng/mL in network labs     o cTnI 99% cutoff is useful only when applied to patients in the clinical setting of myocardial ischemia   o cTnI 99% cutoff should be interpreted in the context of clinical history, ECG findings and possibly cardiac imaging to establish correct diagnosis  o cTnI 99% cutoff may be suggestive but clearly not indicative of a coronary event without the clinical setting of myocardial ischemia         Results for orders placed during the hospital encounter of 19    Echo complete with contrast if indicated    Narrative  [address] [place] [address]    Transthoracic Echocardiogram  2D, M-mode, Doppler, and Color Doppler    Study date:  2019    Patient: Jennifer Estevez  MR number: [id number]  Account number: [de-identified]  : 1965  Age: 47 years  Gender: Female  Status: Outpatient  Location: [facility] [place]  Height: 61 in  Weight: 192 5 lb  BP: 144/ 86 mmHg    Indications: CAD; CABG; Stent    Diagnoses: I25 10 - Atherosclerotic heart disease of native coronary artery without angina pectoris, I25 810 - Atherosclerosis of coronary artery bypass graft(s) without angina pectoris, Z95 5 - Presence of coronary angioplasty implant and  graft    Sonographer:  CORKY Hua  Primary Physician:  Jf Soni  Referring Physician:  Merritt Palacios MD  Group:  [facility]  Interpreting Physician:  Nino Larkin MD    SUMMARY    PROCEDURE INFORMATION:  This was a technically difficult study  LEFT VENTRICLE:  Systolic function was normal by visual assessment  Ejection fraction was estimated to be 60 %  Although no diagnostic regional wall motion abnormality was identified, this possibility cannot be completely excluded on the basis of this study  LEFT ATRIUM:  The atrium was mildly dilated  MITRAL VALVE:  There was mild regurgitation  TRICUSPID VALVE:  There was mild regurgitation  PULMONIC VALVE:  There was trace regurgitation  HISTORY: PRIOR HISTORY: CAD; HTN; HLD; CABGx2; Stent; COWNA; DM; MI; HF; Sleep apnea; Heart valve problem; F  smoker    PROCEDURE: The study was performed in the [facility]  This was a routine study  The transthoracic approach was used  The study included complete 2D imaging, M-mode, complete spectral Doppler, and color Doppler  Echocardiographic views were limited due to poor patient compliance, poor acoustic window availability, decreased penetration, and lung interference  This was a technically difficult study  LEFT VENTRICLE: Size was normal  Systolic function was normal by visual assessment  Ejection fraction was estimated to be 60 %   Although no diagnostic regional wall motion abnormality was identified, this possibility cannot be completely  excluded on the basis of this study  Wall thickness was normal  DOPPLER: The ratio of early ventricular filling to atrial contraction velocities was within the normal range  Left ventricular diastolic function parameters were normal     RIGHT VENTRICLE: The size was normal  Systolic function was normal  DOPPLER: Systolic pressure was within the normal range  Estimated peak pressure was 30 mmHg  LEFT ATRIUM: The atrium was mildly dilated  RIGHT ATRIUM: Size was normal     MITRAL VALVE: Valve structure was normal  There was normal leaflet separation  DOPPLER: The transmitral velocity was within the normal range  There was no evidence for stenosis  There was mild regurgitation  AORTIC VALVE: The valve was trileaflet  Leaflets exhibited normal thickness and normal cuspal separation  DOPPLER: Transaortic velocity was within the normal range  There was no evidence for stenosis  There was no regurgitation  TRICUSPID VALVE: The valve structure was normal  There was normal leaflet separation  DOPPLER: The transtricuspid velocity was within the normal range  There was no evidence for stenosis  There was mild regurgitation  PULMONIC VALVE: Leaflets exhibited normal thickness, no calcification, and normal cuspal separation  DOPPLER: The transpulmonic velocity was within the normal range  There was trace regurgitation  PERICARDIUM: There was no pericardial effusion  AORTA: The root exhibited normal size  SYSTEMIC VEINS: IVC: The inferior vena cava was normal in size and course   Respirophasic changes were normal     SYSTEM MEASUREMENT TABLES    2D  %FS: 31 31 %  Ao Diam: 2 34 cm  EDV(Teich): 66 44 ml  EF(Teich): 59 78 %  ESV(Cube): 19 42 ml  ESV(Teich): 26 72 ml  IVSd: 0 85 cm  LA Area: 23 76 cm2  LA Diam: 3 38 cm  LVEDV MOD A4C: 147 9 ml  LVEF MOD A4C: 74 82 %  LVESV MOD A4C: 37 24 ml  LVIDd: 3 91 cm  LVIDs: 2 69 cm  LVLd A4C: 7 76 cm  LVLs A4C: 6 46 cm  LVPWd: 0 89 cm  RA Area: 13 7 cm2  RV Diam : 3 47 cm  SI(Cube): 21 77 ml/m2  SI(Teich): 21 36 ml/m2  SV MOD A4C: 110 65 ml  SV(Cube): 40 5 ml  SV(Teich): 39 72 ml    CW  RAP: 10 mmHg  TR Vmax: 2 86 m/s  TR maxP 64 mmHg    MM  TAPSE: 1 95 cm    PW  E': 0 09 m/s  E/E': 11 03  MV A Manuel: 0 82 m/s  MV Dec Alcorn: 6 43 m/s2  MV DecT: 153 22 ms  MV E Manuel: 0 99 m/s  MV E/A Ratio: 1 21  RVSP: 42 64 mmHg    [facility] Accredited Echocardiography Laboratory    Prepared and electronically signed by    Sonya Borjas MD  Signed [id number] 15:48:21    No results found for this or any previous visit  This note was completed in part utilizing [employer]babberly direct voice recognition software  Grammatical errors, random word insertion, spelling mistakes, and incomplete sentences may be an occasional consequence of the system secondary to software limitations, ambient noise and hardware issues  At the time of dictation, efforts were made to edit, clarify and /or correct errors  Please read the chart carefully and recognize, using context, where substitutions have occurred    If you have any questions or concerns about the context, text or information contained within the body of this dictation, please contact myself, the provider, for further clarification

## 2022-08-18 ENCOUNTER — HOSPITAL ENCOUNTER (OUTPATIENT)
Dept: INTERVENTIONAL RADIOLOGY/VASCULAR | Facility: HOSPITAL | Age: 57
Discharge: HOME | End: 2022-08-18
Attending: RADIOLOGY
Payer: MEDICARE

## 2022-08-18 ENCOUNTER — HOME CARE VISIT (OUTPATIENT)
Dept: HOME HEALTH SERVICES | Facility: HOME HEALTHCARE | Age: 57
End: 2022-08-18
Payer: MEDICARE

## 2022-08-18 ENCOUNTER — HOSPITAL ENCOUNTER (OUTPATIENT)
Dept: RADIOLOGY | Facility: HOSPITAL | Age: 57
Discharge: HOME | End: 2022-08-18

## 2022-08-18 ENCOUNTER — TELEPHONE (OUTPATIENT)
Dept: UROLOGY | Facility: AMBULATORY SURGERY CENTER | Age: 57
End: 2022-08-18

## 2022-08-18 DIAGNOSIS — N20.0 NEPHROLITHIASIS: Primary | ICD-10-CM

## 2022-08-18 DIAGNOSIS — N12 EMPHYSEMATOUS PYELONEPHRITIS: ICD-10-CM

## 2022-08-18 DIAGNOSIS — N12 PYELITIS: ICD-10-CM

## 2022-08-18 DIAGNOSIS — N12 PYELITIS: Primary | ICD-10-CM

## 2022-08-18 PROCEDURE — 50431 NJX PX NFROSGRM &/URTRGRM: CPT

## 2022-08-18 PROCEDURE — 50389 REMOVE RENAL TUBE W/FLUORO: CPT | Performed by: RADIOLOGY

## 2022-08-18 RX ADMIN — IOHEXOL 5 ML: 350 INJECTION, SOLUTION INTRAVENOUS at 11:16

## 2022-08-18 NOTE — DISCHARGE INSTRUCTIONS
Nephrostomy Tube Care     WHAT YOU NEED TO KNOW:   A nephrostomy tube is a catheter (thin plastic tube) that is inserted through your skin and into your kidney  The nephrostomy tube drains urine from your kidney into a collecting bag outside your body  You may need a nephrostomy tube when something is blocking the normal flow of urine  A nephrostomy tube may be used for a short or a long period of time  The nephrostomy tube comes out of your back, so you will need someone to help care for your nephrostomy tube  DISCHARGE INSTRUCTIONS:      How to clean the skin around the nephrostomy tube and change the bandage:  Since the nephrostomy tube comes out of your back, you will not be able to care for it by yourself  Ask someone to follow the general directions below to check and care for your nephrostomy tube  Gather the items you will need  Disposable (single use) under-pad, and a clean washcloth  Plain soap, warm water, and new medical gloves  Sterile gauze bandages  Clear adhesive dressing or medical tape  Skin barrier  Protective skin film  Trash bag  Remove the old bandage, and check the tube entry site  Have the patient lie on his side with the nephrostomy tube entry site facing up  Place the under-pad where it will catch drainage as you are working with the nephrostomy tube  Wash your hands with soap and water  Put on new medical gloves  Gently remove the old bandage, without pulling on the tube  Do this by holding the skin beside the tube with one hand  With the other hand, gently remove sticky tape and the skin barrier by pulling in the same direction as hair growth  Do not touch the side of the bandage that is placed over or around the tube  Throw the bandage and skin barrier away in a trash bag  Look for signs of infection, such as skin redness and swelling  Report any skin changes to healthcare providers  Clean the tube entry site      Hold the tube in place to keep it from being pulled out while you are cleaning around it  You will need to clean the area twice  For the first cleaning, wet a new gauze bandage with soap and water  Begin at the entry site of the tube  Wipe the skin in circles, moving away from the entry site  Remove blood and any other material with the gauze  Do this as often as needed  Use a new gauze bandage each time you clean the area, moving away from the entry site  For the second cleaning, wet a new gauze bandage with water  Begin at the entry site of the tube  Wipe the skin in circles, moving away from the entry site  Use a new gauze bandage each time you clean the area, moving away from the entry site  Gently pat the skin with a clean washcloth to dry it  Apply the skin barrier and bandages  Roll up a bandage to make it thick, and place it under  the place where the tube enters the skin  Place it to support the tube, and stop it from kinking or bending  Tape the bandage in place, and apply more bandages if directed by a healthcare provider  Bring the tubing forward to the front and tape it to the skin  Do not stretch the tube tight, because this may pull the nephrostomy tube out  How often to change the bandage  Change the bandage around the tube, every other day  If your bandages  get dirty or wet, change them right away, and as often as needed  If your nephrostomy tube is to be used for a long period of time, the tube needs to be changed every 2 to 3 months  Healthcare providers will tell you when you need to make an appointment to have your tube changed  How to care for the urine drainage bag:   Ask if you need to measure and write down how much urine is in the bag before you empty it  Drain urine out of the drainage bag when it is ½ to ? full  Open the spout at the bottom of the bag to empty the urine into the toilet  You may need to detach the drainage bag from the nephrostomy tube to change it    If so, attach a new drainage bag tightly to the nephrostomy tube  How to prevent problems with your nephrostomy tube:   Change bandages, directed  This helps to prevent infection  Throw away or clean your drainage bag as directed by your healthcare provider  Wipe the connecting ends of the drainage bag with alcohol before you reconnect the bag to the tube  This helps prevent infection  Keep the tube taped to your skin and connected to a drainage bag placed below the level of your kidneys  This helps prevent urine from backing up into your kidneys  You may wear a small drainage bag strapped to your leg to let you move around more easily  Check the catheter to be sure it is in place after you change your clothes or do other activities  Do not wear tight clothing over the tube  Place the tubing over your thigh rather than under it when you are sitting down  Be sure that nothing is pulling on the nephrostomy tube when you move around  Change positions if you see little or no urine in your drainage bag  Check to see if the urine tube is twisted or bent  Be sure that you are not sitting or lying on the tube  If there are no kinks and there is little or no urine in the drainage bag, tell your healthcare provider  Flush out the tube as directed  Some tubes get flushed one time a day with 10 mls of NSS You will be given a prescription for the flushes  To flush the nephrostomy tube, clean both connections with alcohol swap  Twist off the drainage bag tube and twist the saline syringe into the nephrostomy tube and flush briskly  Remove the syringe and twist the drainage bag tube back into the nephrostomy tube  Keep the site covered while you shower  Tape a piece of clear adhesive plastic over the dressing to keep it dry while you shower  Do not take tub baths      Contact Interventional Radiology at [phone] Stephanie PATIENTS: Contact Interventional Radiology at [phone]) Richard Lopez PATIENTS: Contact Interventional Radiology at [phone]) if:  The skin around the nephrostomy tube is red, swollen, itches, or has a rash  You have a fever greater than 101 or chills  You have lower back or hip pain  There are changes in how your urine looks or smells  You have little or no urine draining from the nephrostomy tube  You have nausea and are vomiting  The black noam on your tube has moved, or the tube is longer than when it was put in  You have questions or concerns about your condition or care  The nephrostomy tube comes out completely  There is blood, pus, or a bad smell coming from the place where the tube enters your skin  Urine is leaking around the tube  The following pharmacies carry the flush syringes  [facility]                     [facility]       [address]  Phone [phone]            Phone [phone]  [address] & [facility]                      [address]  [place]                                 [phone]  Phone [phone]            Phone [phone]    [facility]                                                                         [facility] [phone]  [address]   Phone [phone]

## 2022-08-18 NOTE — H&P
Interventional Radiology  History and Physical 2022     Tony Saldaña   1965   [id number]    Assessment/Plan:  51-year-old female with history of left-sided emphysematous pyelitis underwent PCN placement 3 weeks ago for decompression and drainage  Patient has recovered from the pyelitis and returns for PCN check with antegrade nephrostogram to assess for ureteral patency  If ureter is patent, plan is to remove the PCN  Problem List Items Addressed This Visit        Genitourinary    Pyelitis    Relevant Orders    IR nephrostomy tube check/change/reposition/reinsertion/upsize             Subjective:     Patient ID: Tony Saldaña is a 62 y o  female  History of Present Illness  with history of left-sided emphysematous pyelitis underwent PCN placement 3 weeks ago for decompression and drainage  Patient has recovered from the pyelitis and returns for PCN check with antegrade nephrostogram to assess for ureteral patency  If ureter is patent, plan is to remove the PCN      Review of Systems      Past Medical History:   Diagnosis Date    Circulation problem     Coronary artery disease     Depression     Diabetes ([address]     Dyspnea on exertion 2019    Glaucoma     Hearing problem     Heart attack ([address] )     Heart failure ([address]     Heart valve problem     High blood pressure     High cholesterol     Obesity, morbid ([address] ) 2021    Open injury of heart     Sepsis ([address] ) 2022    Sleep apnea         Past Surgical History:   Procedure Laterality Date    CORONARY ARTERY BYPASS GRAFT      CORONARY STENT PLACEMENT  2010    CORONARY STENT PLACEMENT  2015    IR NEPHROSTOMY TUBE PLACEMENT  2022    VAGINAL DELIVERY      VAGINAL DELIVERY          Social History     Tobacco Use   Smoking Status Former Smoker    Types: Cigarettes    Quit date:     Years since quittin 6   Smokeless Tobacco Never Used        Social History     Substance and Sexual Activity   Alcohol Use Yes    Comment: social        Social History     Substance and Sexual Activity   Drug Use Yes    Types: Marijuana        Allergies   Allergen Reactions    Metformin Diarrhea    Penicillins Hives       Current Outpatient Medications   Medication Sig Dispense Refill    aspirin (ECOTRIN LOW STRENGTH) 81 mg EC tablet Take 81 mg by mouth daily      carvedilol (COREG) 6 25 mg tablet Take 1 tablet (6 25 mg total) by mouth 2 (two) times a day with meals 180 tablet 3    clopidogrel (PLAVIX) 75 mg tablet TAKE 1 TABLET BY MOUTH EVERY DAY 90 tablet 1    escitalopram (LEXAPRO) 10 mg tablet Take 1 tablet (10 mg total) by mouth 2 (two) times a day 180 tablet 1    glipiZIDE (GLUCOTROL XL) 10 mg 24 hr tablet take 1 tablet by mouth daily 90 tablet 1    hydrochlorothiazide (HYDRODIURIL) 12 5 mg tablet Take 1 tablet (12 5 mg total) by mouth daily 90 tablet 1    losartan (COZAAR) 50 mg tablet take 1 tablet by mouth daily 90 tablet 1    MELATONIN PO Take 10 mg by mouth daily at bedtime      rosuvastatin (CRESTOR) 40 MG tablet Take 1 tablet (40 mg total) by mouth daily 90 tablet 3    zolpidem (AMBIEN) 10 mg tablet TAKE 1 TABLET BY MOUTH DAILY AT BEDTIME AS NEEDED FOR SLEEP  30 tablet 1     No current facility-administered medications for this encounter  Objective: There were no vitals filed for this visit  Physical Exam  Constitutional:       Appearance: Normal appearance  Pulmonary:      Effort: Pulmonary effort is normal    Genitourinary:     Comments: Left PCN in place  Skin:     General: Skin is dry             No results found for: BNP   Lab Results   Component Value Date    WBC 7 93 07/31/2022    HGB 11 5 07/31/2022    HCT 33 7 (L) 07/31/2022    MCV 88 07/31/2022     07/31/2022     Lab Results   Component Value Date    INR 0 95 07/27/2022    PROTIME 13 4 07/27/2022     Lab Results   Component Value Date    PTT 31 07/27/2022         I have personally reviewed pertinent imaging and laboratory results  Code Status: Prior  Advance Directive and Living Will:      Power of :    POLST:      This text is generated with voice recognition software  There may be translation, syntax,  or grammatical errors  If you have any questions, please contact the dictating provider

## 2022-08-18 NOTE — TELEPHONE ENCOUNTER
Spoke with patient  Advised on recommendations of AP  Scheduled 3 month follow up and provided central scheduling's number for CT appointment

## 2022-08-18 NOTE — TELEPHONE ENCOUNTER
Patient status post antegrade nephrostogram and removal of left PCN by Interventional Radiology today  Nephrostogram noted patent left ureter  If patient asymptomatic, recommend follow-up in 3 months with CT renal stone study  Please assist with scheduling

## 2022-08-22 ENCOUNTER — HOME CARE VISIT (OUTPATIENT)
Dept: HOME HEALTH SERVICES | Facility: HOME HEALTHCARE | Age: 57
End: 2022-08-22
Payer: MEDICARE

## 2022-08-22 VITALS
OXYGEN SATURATION: 97 % | HEART RATE: 65 BPM | SYSTOLIC BLOOD PRESSURE: 98 MMHG | RESPIRATION RATE: 16 BRPM | TEMPERATURE: 97 F | DIASTOLIC BLOOD PRESSURE: 70 MMHG

## 2022-08-22 PROCEDURE — G0299 HHS/HOSPICE OF RN EA 15 MIN: HCPCS

## 2022-08-24 ENCOUNTER — HOSPITAL ENCOUNTER (OUTPATIENT)
Dept: NON INVASIVE DIAGNOSTICS | Age: 57
Discharge: HOME | End: 2022-08-24
Payer: MEDICARE

## 2022-08-24 VITALS
BODY MASS INDEX: 34.17 KG/M2 | WEIGHT: 181 LBS | SYSTOLIC BLOOD PRESSURE: 98 MMHG | HEIGHT: 61 IN | DIASTOLIC BLOOD PRESSURE: 70 MMHG

## 2022-08-24 DIAGNOSIS — I25.810 CORONARY ARTERY DISEASE INVOLVING CORONARY BYPASS GRAFT OF NATIVE HEART WITHOUT ANGINA PECTORIS: ICD-10-CM

## 2022-08-24 DIAGNOSIS — G47.33 OSA (OBSTRUCTIVE SLEEP APNEA): ICD-10-CM

## 2022-08-24 DIAGNOSIS — I10 PRIMARY HYPERTENSION: ICD-10-CM

## 2022-08-24 LAB
AORTIC ROOT: 2.7 CM
APICAL FOUR CHAMBER EJECTION FRACTION: 53 %
E WAVE DECELERATION TIME: 128 MS
FRACTIONAL SHORTENING: 37 % (ref 28–44)
INTERVENTRICULAR SEPTUM IN DIASTOLE (PARASTERNAL SHORT AXIS VIEW): 0.8 CM
INTERVENTRICULAR SEPTUM: 0.8 CM (ref 0.6–1.1)
LAAS-AP2: 15.2 CM2
LAAS-AP4: 14 CM2
LEFT ATRIUM AREA SYSTOLE SINGLE PLANE A4C: 15.2 CM2
LEFT ATRIUM SIZE: 3.3 CM
LEFT INTERNAL DIMENSION IN SYSTOLE: 2.6 CM (ref 2.1–4)
LEFT VENTRICLE DIASTOLIC VOLUME (MOD BIPLANE): 117 ML
LEFT VENTRICLE SYSTOLIC VOLUME (MOD BIPLANE): 42 ML
LEFT VENTRICULAR INTERNAL DIMENSION IN DIASTOLE: 4.1 CM (ref 3.5–6)
LEFT VENTRICULAR POSTERIOR WALL IN END DIASTOLE: 0.8 CM
LEFT VENTRICULAR STROKE VOLUME: 50 ML
LV EF: 65 %
LVSV (TEICH): 50 ML
MV E'TISSUE VEL-SEP: 10 CM/S
MV PEAK A VEL: 0.78 M/S
MV PEAK E VEL: 77 CM/S
MV STENOSIS PRESSURE HALF TIME: 37 MS
MV VALVE AREA P 1/2 METHOD: 5.95 CM2
RIGHT ATRIUM AREA SYSTOLE A4C: 13.6 CM2
RIGHT VENTRICLE ID DIMENSION: 2.6 CM
SL CV LEFT ATRIUM LENGTH A2C: 5.3 CM
SL CV LV EF: 55
SL CV PED ECHO LEFT VENTRICLE DIASTOLIC VOLUME (MOD BIPLANE) 2D: 75 ML
SL CV PED ECHO LEFT VENTRICLE SYSTOLIC VOLUME (MOD BIPLANE) 2D: 24 ML
TR MAX PG: 22 MMHG
TR PEAK VELOCITY: 2.4 M/S
TRICUSPID VALVE PEAK REGURGITATION VELOCITY: 2.37 M/S

## 2022-08-24 PROCEDURE — 93306 TTE W/DOPPLER COMPLETE: CPT | Performed by: INTERNAL MEDICINE

## 2022-08-24 PROCEDURE — 93306 TTE W/DOPPLER COMPLETE: CPT

## 2022-08-25 ENCOUNTER — TELEPHONE (OUTPATIENT)
Dept: CARDIOLOGY CLINIC | Facility: CLINIC | Age: 57
End: 2022-08-25

## 2022-08-25 NOTE — TELEPHONE ENCOUNTER
----- Message from [id number] sent at 8/24/2022  4:08 PM EDT -----  Please let her know her echo shows normal pumping function  It is a little bit stiff which we sometimes seen people with high blood pressure    Overall, reassuring findings

## 2022-08-30 DIAGNOSIS — G47.00 INSOMNIA, UNSPECIFIED TYPE: ICD-10-CM

## 2022-08-31 NOTE — TELEPHONE ENCOUNTER
Medication:Ambien 10mgs  Medication failed [facility] protocol  Please forward to your office staff for further review as this medication was reviewed by a [facility] RN

## 2022-09-01 RX ORDER — ZOLPIDEM TARTRATE 10 MG/1
TABLET ORAL
Qty: 30 TABLET | Refills: 1 | Status: SHIPPED | OUTPATIENT
Start: 2022-09-01

## 2022-09-06 ENCOUNTER — HOSPITAL ENCOUNTER (OUTPATIENT)
Dept: CT IMAGING | Facility: HOSPITAL | Age: 57
Discharge: HOME | End: 2022-09-06
Payer: MEDICARE

## 2022-09-06 DIAGNOSIS — N20.0 NEPHROLITHIASIS: ICD-10-CM

## 2022-09-06 PROCEDURE — 74176 CT ABD & PELVIS W/O CONTRAST: CPT

## 2022-09-06 PROCEDURE — G1004 CDSM NDSC: HCPCS

## 2022-09-13 ENCOUNTER — TELEPHONE (OUTPATIENT)
Dept: CARDIOLOGY CLINIC | Facility: CLINIC | Age: 57
End: 2022-09-13

## 2022-09-13 NOTE — TELEPHONE ENCOUNTER
Pt states "Ever since I started the rosuvastatin, I'm always sad and crying  The medication is making me so depressed "  Pt states that she started rosuvastatin 08/23/2022 and the last week or so she has been feeling these sad feelings  Please advise

## 2022-09-13 NOTE — TELEPHONE ENCOUNTER
She is also on carvedilol, which can cause depressive sx  Alternatively, the depression can be coincidental with the meds  We can switch her back to atorvastatin if she would like  Cognitive symptoms however are more likely with atorvastatin than rosuvastatin, given its metabolism  Either way, I think she should see her PCP about her depression

## 2022-09-20 ENCOUNTER — TELEPHONE (OUTPATIENT)
Dept: PULMONOLOGY | Facility: CLINIC | Age: 57
End: 2022-09-20

## 2022-09-20 NOTE — TELEPHONE ENCOUNTER
Pt calling in ref to her CPAP supplies  Pt states back in January this was supposed to be ordered for her but she has yet to receive it  Pt is asking if a physical script of the order can be printed for her to bring to the [employer] if that is the fastest way for her to get her supplies   Please advise

## 2022-09-21 DIAGNOSIS — G47.33 OBSTRUCTIVE SLEEP APNEA SYNDROME: Primary | ICD-10-CM

## 2022-09-23 ENCOUNTER — DOCUMENTATION (OUTPATIENT)
Dept: PULMONOLOGY | Facility: CLINIC | Age: 57
End: 2022-09-23

## 2022-09-30 NOTE — PROGRESS NOTES
Pt left VM stating she still has not heard anything regarding her pap order  Please advise and call pt with an update

## 2022-10-03 ENCOUNTER — DOCUMENTATION (OUTPATIENT)
Dept: PULMONOLOGY | Facility: HOSPITAL | Age: 57
End: 2022-10-03

## 2022-11-03 DIAGNOSIS — G47.00 INSOMNIA, UNSPECIFIED TYPE: ICD-10-CM

## 2022-11-03 RX ORDER — ZOLPIDEM TARTRATE 10 MG/1
TABLET ORAL
Qty: 90 TABLET | Refills: 1 | Status: SHIPPED | OUTPATIENT
Start: 2022-11-03

## 2022-11-05 DIAGNOSIS — I10 ESSENTIAL HYPERTENSION: ICD-10-CM

## 2022-11-05 RX ORDER — LOSARTAN POTASSIUM 50 MG/1
TABLET ORAL
Qty: 90 TABLET | Refills: 0 | Status: SHIPPED | OUTPATIENT
Start: 2022-11-05

## 2022-12-09 DIAGNOSIS — I10 ESSENTIAL HYPERTENSION: ICD-10-CM

## 2022-12-09 RX ORDER — HYDROCHLOROTHIAZIDE 12.5 MG/1
TABLET ORAL
Qty: 90 TABLET | Refills: 1 | Status: SHIPPED | OUTPATIENT
Start: 2022-12-09

## 2022-12-13 ENCOUNTER — ANNUAL EXAM (OUTPATIENT)
Dept: FAMILY MEDICINE CLINIC | Facility: HOSPITAL | Age: 57
End: 2022-12-13

## 2022-12-13 VITALS
SYSTOLIC BLOOD PRESSURE: 118 MMHG | OXYGEN SATURATION: 98 % | BODY MASS INDEX: 35.57 KG/M2 | HEART RATE: 68 BPM | WEIGHT: 188.4 LBS | DIASTOLIC BLOOD PRESSURE: 78 MMHG | HEIGHT: 61 IN | TEMPERATURE: 98.2 F

## 2022-12-13 DIAGNOSIS — N89.8 VAGINAL ITCHING: ICD-10-CM

## 2022-12-13 DIAGNOSIS — Z12.11 SCREEN FOR COLON CANCER: ICD-10-CM

## 2022-12-13 DIAGNOSIS — Z12.4 SCREENING FOR CERVICAL CANCER: ICD-10-CM

## 2022-12-13 DIAGNOSIS — Z01.419 ENCOUNTER FOR GYNECOLOGICAL EXAMINATION WITHOUT ABNORMAL FINDING: Primary | ICD-10-CM

## 2022-12-13 DIAGNOSIS — Z12.31 ENCOUNTER FOR SCREENING MAMMOGRAM FOR BREAST CANCER: ICD-10-CM

## 2022-12-13 LAB — SL AMB POCT FECES OCC BLD: NORMAL

## 2022-12-13 NOTE — PROGRESS NOTES
Pt is a64 y o  post menopausal female who presents for preventive care  Partner same  (male, >1 year), + sexually active - denies dysparunia or vaginal dryness    Unsure when last gyn exam was  Denies hx of abnormal paps  Denies gyn surgeries/procedures except tubal ligation  safe sexual practices followed: no recent change in partner  does feel safe in relationship: yes    Mammo: overdue for - ordered  Colonoscopy: never - she declines  DEXA: due at age 61  safety at home: yes    States she was given a med when in hospital in July and made her vagina very itchy  Took a pill w/some relief but has ongoing vaginal itch   Has white creamy discharge - worse than her usual      Past Medical History:   Diagnosis Date   • Circulation problem    • Coronary artery disease    • Depression    • Diabetes [address] [address] )    • Dyspnea on exertion 2019   • Glaucoma    • Hearing problem    • Heart attack ([facility] [address] [facility] )    • Heart failure ([facility] [address] [facility] )    • Heart valve problem    • High blood pressure    • High cholesterol    • Obesity, morbid ([facility] ) 2021   • Open injury of heart    • Sepsis ([facility] [address] [facility] ) 2022   • Sleep apnea        Past Surgical History:   Procedure Laterality Date   • CORONARY ARTERY BYPASS GRAFT     • CORONARY STENT PLACEMENT  2010   • CORONARY STENT PLACEMENT  2015   • IR NEPHROSTOMY TUBE CHECK/CHANGE/REPOSITION/REINSERTION/UPSIZE  2022   • IR NEPHROSTOMY TUBE PLACEMENT  2022   • VAGINAL DELIVERY     • VAGINAL DELIVERY         Ob Hx:   OB History    Para Term  AB Living   2         2   SAB IAB Ectopic Multiple Live Births           2      # Outcome Date GA Lbr Jean-Paul/2nd Weight Sex Delivery Anes PTL Lv   2             1                 Gyn HX:  postmenopausal, denies abnormal paps, denies gyn surgeries/procedures      Current Outpatient Medications:   •  aspirin (ECOTRIN LOW STRENGTH) 81 mg EC tablet, Take 81 mg by mouth daily, Disp: , Rfl:   •  carvedilol (COREG) 6 25 mg tablet, Take 1 tablet (6 25 mg total) by mouth 2 (two) times a day with meals, Disp: 180 tablet, Rfl: 3  •  clopidogrel (PLAVIX) 75 mg tablet, TAKE 1 TABLET BY MOUTH EVERY DAY, Disp: 90 tablet, Rfl: 1  •  escitalopram (LEXAPRO) 10 mg tablet, Take 1 tablet (10 mg total) by mouth 2 (two) times a day, Disp: 180 tablet, Rfl: 1  •  glipiZIDE (GLUCOTROL XL) 10 mg 24 hr tablet, take 1 tablet by mouth daily, Disp: 90 tablet, Rfl: 1  •  hydrochlorothiazide (HYDRODIURIL) 12 5 mg tablet, TAKE 1 TABLET BY MOUTH EVERY DAY, Disp: 90 tablet, Rfl: 1  •  losartan (COZAAR) 50 mg tablet, TAKE 1 TABLET BY MOUTH EVERY DAY, Disp: 90 tablet, Rfl: 0  •  MELATONIN PO, Take 10 mg by mouth daily at bedtime, Disp: , Rfl:   •  rosuvastatin (CRESTOR) 40 MG tablet, Take 1 tablet (40 mg total) by mouth daily, Disp: 90 tablet, Rfl: 3  •  zolpidem (AMBIEN) 10 mg tablet, TAKE 1 TABLET BY MOUTH DAILY AT BEDTIME AS NEEDED FOR SLEEP , Disp: 90 tablet, Rfl: 1    Allergies   Allergen Reactions   • Metformin Diarrhea   • Penicillins Hives       Social History     Socioeconomic History   • Marital status:      Spouse name: None   • Number of children: None   • Years of education: None   • Highest education level: None   Occupational History   • None   Tobacco Use   • Smoking status: Former     Types: Cigarettes     Quit date:      Years since quittin 9   • Smokeless tobacco: Never   Vaping Use   • Vaping Use: Never used   Substance and Sexual Activity   • Alcohol use: Yes     Comment: social   • Drug use: Yes     Types: Marijuana   • Sexual activity: None   Other Topics Concern   • None   Social History Narrative   • None     Social Determinants of Health     Financial Resource Strain: Not on file   Food Insecurity: No Food Insecurity   • Worried About Running Out of Food in the Last Year: Never true   • Ran Out of Food in the Last Year: Never true   Transportation Needs: No Transportation Needs   • Lack of Transportation (Medical): No   • Lack of Transportation (Non-Medical): No   Physical Activity: Not on file   Stress: Not on file   Social Connections: Not on file   Intimate Partner Violence: Not on file   Housing Stability: Low Risk    • Unable to Pay for Housing in the Last Year: No   • Number of Places Lived in the Last Year: 1   • Unstable Housing in the Last Year: No       Family History   Problem Relation Age of Onset   • Hypertension Father    • Coronary artery disease Father    • Sudden death Father         cardiac 2014       Blood pressure 118/78, pulse 68, temperature 98 2 °F (36 8 °C), height 5' 1" (1 549 m), weight 85 5 kg (188 lb 6 4 oz), SpO2 98 %  and Body mass index is 35 6 kg/m²  Physical Exam  Vitals reviewed  Exam conducted with a chaperone present  Constitutional:       General: She is not in acute distress  Appearance: Normal appearance  She is obese  HENT:      Head: Normocephalic and atraumatic  Neck:      Thyroid: No thyromegaly  Cardiovascular:      Rate and Rhythm: Normal rate and regular rhythm  Heart sounds: No murmur heard  Pulmonary:      Effort: Pulmonary effort is normal  No respiratory distress  Breath sounds: Normal breath sounds  Chest:   Breasts:     Right: Normal       Left: Normal    Abdominal:      Palpations: Abdomen is soft  Tenderness: There is no abdominal tenderness  There is no guarding or rebound  Genitourinary:     Rectum: Normal  Guaiac result negative  Musculoskeletal:      Cervical back: Normal range of motion  Right lower leg: No edema  Left lower leg: No edema  Lymphadenopathy:      Cervical: No cervical adenopathy  Upper Body:      Right upper body: No axillary adenopathy  Left upper body: No axillary adenopathy  Skin:     General: Skin is warm and dry  Neurological:      General: No focal deficit present  Mental Status: She is alert and oriented to person, place, and time     Psychiatric:         Mood and Affect: Mood normal          Behavior: Behavior normal          vulva: normal external genitalia for age and no lesions, masses, epithelial changes, or exudate  vagina: color red, rugae  flattening of rugae and minimal thin white discharge  cervix: parous and no lesions   uterus: non-tender, mobile, anterior  adnexa: no masses or tenderness  rectum: no masses or nodularity    A/P:  Pt is a 62 y o  postmenopausal female      Spencersanaz Yang was seen today for gynecologic exam     Diagnoses and all orders for this visit:    Encounter for gynecological examination without abnormal finding  Comments:  gyn exam/pap smear w/HPV updated, next gyn exam due in 1 year; update mammo as ordered, she refuses colon screening    Vaginal itching  Comments:  evaluate further w/vaginal culture obtained - will determine further plan pending results  Orders:  -     Cancel: NuSwab Vaginitis (VG); Future  -     Cancel: NuSwab Vaginitis (VG)  -     Fungal culture;  Future  -     Fungal culture    Encounter for screening mammogram for breast cancer  -     Mammo screening bilateral w 3d & cad; Future    Screening for cervical cancer  -     Liquid-based pap, diagnostic    Screen for colon cancer  -     POCT hemoccult screening      flu shot declined  Update fasting labs as previously ordered before next appt in 3 months

## 2022-12-15 LAB
HPV HR 12 DNA CVX QL NAA+PROBE: NEGATIVE
HPV16 DNA CVX QL NAA+PROBE: NEGATIVE
HPV18 DNA CVX QL NAA+PROBE: NEGATIVE

## 2022-12-16 DIAGNOSIS — B37.31 YEAST VAGINITIS: Primary | ICD-10-CM

## 2022-12-16 RX ORDER — FLUCONAZOLE 100 MG/1
100 TABLET ORAL DAILY
Qty: 7 TABLET | Refills: 0 | Status: SHIPPED | OUTPATIENT
Start: 2022-12-16 | End: 2022-12-23

## 2022-12-18 LAB — FUNGUS SPEC CULT: ABNORMAL

## 2022-12-22 DIAGNOSIS — F32.A DEPRESSION, UNSPECIFIED DEPRESSION TYPE: ICD-10-CM

## 2022-12-22 RX ORDER — ESCITALOPRAM OXALATE 10 MG/1
TABLET ORAL
Qty: 180 TABLET | Refills: 1 | Status: SHIPPED | OUTPATIENT
Start: 2022-12-22

## 2022-12-23 LAB
LAB AP GYN PRIMARY INTERPRETATION: NORMAL
Lab: NORMAL

## 2023-01-05 DIAGNOSIS — I25.10 CORONARY ARTERY DISEASE INVOLVING NATIVE CORONARY ARTERY OF NATIVE HEART WITHOUT ANGINA PECTORIS: ICD-10-CM

## 2023-01-06 RX ORDER — CLOPIDOGREL BISULFATE 75 MG/1
TABLET ORAL
Qty: 90 TABLET | Refills: 1 | Status: SHIPPED | OUTPATIENT
Start: 2023-01-06

## 2023-01-23 NOTE — ASSESSMENT & PLAN NOTE
Related to kidney stones  With PCN tube  Needs urology f/u and plans to call and schedule  [facility]NA for PCN tube care  Complete Cipro as prescribed  [Initial Visit] : an initial visit for [[email or web address]] : her left knee

## 2023-03-07 DIAGNOSIS — E78.5 HYPERLIPIDEMIA, UNSPECIFIED HYPERLIPIDEMIA TYPE: ICD-10-CM

## 2023-03-07 DIAGNOSIS — E11.65 TYPE 2 DIABETES MELLITUS WITH HYPERGLYCEMIA, WITHOUT LONG-TERM CURRENT USE OF INSULIN (HCC): Primary | ICD-10-CM

## 2023-03-20 ENCOUNTER — APPOINTMENT (OUTPATIENT)
Dept: LAB | Facility: HOSPITAL | Age: 58
End: 2023-03-20

## 2023-03-20 DIAGNOSIS — E11.65 TYPE 2 DIABETES MELLITUS WITH HYPERGLYCEMIA, WITHOUT LONG-TERM CURRENT USE OF INSULIN (HCC): ICD-10-CM

## 2023-03-20 DIAGNOSIS — E78.2 MIXED HYPERLIPIDEMIA: ICD-10-CM

## 2023-03-20 DIAGNOSIS — E78.5 HYPERLIPIDEMIA, UNSPECIFIED HYPERLIPIDEMIA TYPE: ICD-10-CM

## 2023-03-20 LAB
ALBUMIN SERPL BCP-MCNC: 3.7 G/DL (ref 3.5–5)
ALP SERPL-CCNC: 89 U/L (ref 46–116)
ALT SERPL W P-5'-P-CCNC: 49 U/L (ref 12–78)
ANION GAP SERPL CALCULATED.3IONS-SCNC: 5 MMOL/L (ref 4–13)
AST SERPL W P-5'-P-CCNC: 44 U/L (ref 5–45)
BASOPHILS # BLD AUTO: 0.06 THOUSANDS/ÂΜL (ref 0–0.1)
BASOPHILS NFR BLD AUTO: 1 % (ref 0–1)
BILIRUB SERPL-MCNC: 0.41 MG/DL (ref 0.2–1)
BUN SERPL-MCNC: 13 MG/DL (ref 5–25)
CALCIUM SERPL-MCNC: 9.7 MG/DL (ref 8.3–10.1)
CHLORIDE SERPL-SCNC: 103 MMOL/L (ref 96–108)
CHOLEST SERPL-MCNC: 154 MG/DL
CO2 SERPL-SCNC: 30 MMOL/L (ref 21–32)
CREAT SERPL-MCNC: 0.94 MG/DL (ref 0.6–1.3)
CREAT UR-MCNC: 146 MG/DL
EOSINOPHIL # BLD AUTO: 0.2 THOUSAND/ÂΜL (ref 0–0.61)
EOSINOPHIL NFR BLD AUTO: 2 % (ref 0–6)
ERYTHROCYTE [DISTWIDTH] IN BLOOD BY AUTOMATED COUNT: 12.6 % (ref 11.6–15.1)
EST. AVERAGE GLUCOSE BLD GHB EST-MCNC: 272 MG/DL
GFR SERPL CREATININE-BSD FRML MDRD: 67 ML/MIN/1.73SQ M
GLUCOSE P FAST SERPL-MCNC: 251 MG/DL (ref 65–99)
HBA1C MFR BLD: 11.1 %
HCT VFR BLD AUTO: 44 % (ref 34.8–46.1)
HDLC SERPL-MCNC: 42 MG/DL
HGB BLD-MCNC: 14.6 G/DL (ref 11.5–15.4)
IMM GRANULOCYTES # BLD AUTO: 0.02 THOUSAND/UL (ref 0–0.2)
IMM GRANULOCYTES NFR BLD AUTO: 0 % (ref 0–2)
LDLC SERPL CALC-MCNC: 75 MG/DL (ref 0–100)
LYMPHOCYTES # BLD AUTO: 1.99 THOUSANDS/ÂΜL (ref 0.6–4.47)
LYMPHOCYTES NFR BLD AUTO: 23 % (ref 14–44)
MCH RBC QN AUTO: 28.7 PG (ref 26.8–34.3)
MCHC RBC AUTO-ENTMCNC: 33.2 G/DL (ref 31.4–37.4)
MCV RBC AUTO: 87 FL (ref 82–98)
MICROALBUMIN UR-MCNC: 73.4 MG/L (ref 0–20)
MICROALBUMIN/CREAT 24H UR: 50 MG/G CREATININE (ref 0–30)
MONOCYTES # BLD AUTO: 0.55 THOUSAND/ÂΜL (ref 0.17–1.22)
MONOCYTES NFR BLD AUTO: 6 % (ref 4–12)
NEUTROPHILS # BLD AUTO: 6.04 THOUSANDS/ÂΜL (ref 1.85–7.62)
NEUTS SEG NFR BLD AUTO: 68 % (ref 43–75)
NRBC BLD AUTO-RTO: 0 /100 WBCS
PLATELET # BLD AUTO: 289 THOUSANDS/UL (ref 149–390)
PMV BLD AUTO: 9.4 FL (ref 8.9–12.7)
POTASSIUM SERPL-SCNC: 3.9 MMOL/L (ref 3.5–5.3)
PROT SERPL-MCNC: 7.5 G/DL (ref 6.4–8.4)
RBC # BLD AUTO: 5.08 MILLION/UL (ref 3.81–5.12)
SODIUM SERPL-SCNC: 138 MMOL/L (ref 135–147)
TRIGL SERPL-MCNC: 184 MG/DL
TSH SERPL DL<=0.05 MIU/L-ACNC: 2.5 UIU/ML (ref 0.45–4.5)
WBC # BLD AUTO: 8.86 THOUSAND/UL (ref 4.31–10.16)

## 2023-03-20 PROCEDURE — 82570 ASSAY OF URINE CREATININE: CPT | Performed by: NURSE PRACTITIONER

## 2023-03-20 PROCEDURE — 82043 UR ALBUMIN QUANTITATIVE: CPT | Performed by: NURSE PRACTITIONER

## 2023-03-23 ENCOUNTER — OFFICE VISIT (OUTPATIENT)
Dept: FAMILY MEDICINE CLINIC | Facility: HOSPITAL | Age: 58
End: 2023-03-23

## 2023-03-23 VITALS
TEMPERATURE: 97.7 F | HEART RATE: 67 BPM | HEIGHT: 61 IN | BODY MASS INDEX: 35.01 KG/M2 | SYSTOLIC BLOOD PRESSURE: 128 MMHG | DIASTOLIC BLOOD PRESSURE: 72 MMHG | WEIGHT: 185.4 LBS

## 2023-03-23 DIAGNOSIS — I25.810 CORONARY ARTERY DISEASE INVOLVING CORONARY BYPASS GRAFT OF NATIVE HEART WITHOUT ANGINA PECTORIS: ICD-10-CM

## 2023-03-23 DIAGNOSIS — E78.2 MIXED HYPERLIPIDEMIA: ICD-10-CM

## 2023-03-23 DIAGNOSIS — F33.1 MODERATE EPISODE OF RECURRENT MAJOR DEPRESSIVE DISORDER (HCC): ICD-10-CM

## 2023-03-23 DIAGNOSIS — E11.65 TYPE 2 DIABETES MELLITUS WITH HYPERGLYCEMIA, WITHOUT LONG-TERM CURRENT USE OF INSULIN (HCC): Primary | ICD-10-CM

## 2023-03-23 DIAGNOSIS — Z00.00 ENCOUNTER FOR SUBSEQUENT ANNUAL WELLNESS VISIT IN MEDICARE PATIENT: ICD-10-CM

## 2023-03-23 DIAGNOSIS — E11.29 TYPE 2 DIABETES MELLITUS WITH MICROALBUMINURIA, WITHOUT LONG-TERM CURRENT USE OF INSULIN (HCC): ICD-10-CM

## 2023-03-23 DIAGNOSIS — I10 PRIMARY HYPERTENSION: ICD-10-CM

## 2023-03-23 DIAGNOSIS — R80.9 TYPE 2 DIABETES MELLITUS WITH MICROALBUMINURIA, WITHOUT LONG-TERM CURRENT USE OF INSULIN (HCC): ICD-10-CM

## 2023-03-23 PROBLEM — R09.02 HYPOXIA: Status: RESOLVED | Noted: 2022-07-29 | Resolved: 2023-03-23

## 2023-03-23 NOTE — PROGRESS NOTES
Assessment and Plan:     Problem List Items Addressed This Visit        Endocrine    Type 2 diabetes mellitus, without long-term current use of insulin ([facility] [address] ) - Primary       Lab Results   Component Value Date    HGBA1C 11 1 (H) 03/20/2023     A1C much higher than previous (7 3%), likely due to dietary indiscretion and sedentary lifestyle  Lengthy discussion w/pt re: necessary diet changes and need to incorporate regular exercise  Recommend and she is agreeable to starting jardiance daily  Continue glipizide daily in addition  Recheck A1C in 3 months - will refer to endocrine if no improvement  Offered consult to diabetic education - she declined  Eye exam UTD  Update foot exam at next appt in June         Relevant Medications    Empagliflozin (Jardiance) 25 MG TABS    Other Relevant Orders    Comprehensive metabolic panel    Hemoglobin A1C       Cardiovascular and Mediastinum    Coronary artery disease involving coronary bypass graft of native heart without angina pectoris     Clinically stable/asymptomatic   F/U with cardiology as scheduled next week         Hypertension     Stable BP control on meds   Continue same doses as rx'd  Return in 3 months for next recheck  See cardiology next week as scheduled            Other    Hyperlipidemia     Acceptable lipid control on daily rosuvastatin  Continue same dose & f/u with cardiology next week         BMI 35 0-35 9,adult     Weight loss encouraged w/healthy diet and regular exercise efforts         Moderate episode of recurrent major depressive disorder ([facility] [address] )     Mood stable on daily lexapro - will continue same dose as she desires  Discussed option to increase dose and/or start therapy if she wishes  Return in 3 months for next follow up - call sooner w/concerns        Other Visit Diagnoses     Encounter for subsequent annual wellness visit in Medicare patient            BMI Counseling: Body mass index is 35 03 kg/m²   The BMI is above normal  Nutrition recommendations include encouraging healthy choices of fruits and vegetables, decreasing fast food intake, consuming healthier snacks, limiting drinks that contain sugar, moderation in carbohydrate intake and reducing intake of cholesterol  Exercise recommendations include exercising 3-5 times per week  No pharmacotherapy was ordered  Rationale for BMI follow-up plan is due to patient being overweight or obese  Preventive health issues were discussed with patient, and age appropriate screening tests were ordered as noted in patient's After Visit Summary  Personalized health advice and appropriate referrals for health education or preventive services given if needed, as noted in patient's After Visit Summary  History of Present Illness:     Patient presents for a Medicare Wellness Visit      Here for routine follow up  States she has been well  Admits to eating less healthy - drinks chocolate milk and eats ice cream regularly  Not walking as much since no longer working at [employer]  Has supplies at home but does not check blood sugars  Compliant w/meds as rx'd  Due to see cardiology next week  Patient Care Team:  Sharon Glasgow as PCP - General (Family Medicine)     Review of Systems:       Review of Systems   Constitutional: Negative  Respiratory: Negative  Cardiovascular: Negative  Genitourinary:        Incontinence w/coughing and sneezing since being in hospital in July   Psychiatric/Behavioral: Positive for dysphoric mood (sometimes "gets in my head", worse when she is alone)  Negative for self-injury and suicidal ideas          Problem List:     Patient Active Problem List   Diagnosis   • Coronary artery disease involving native coronary artery of native heart without angina pectoris   • S/P CABG x 2   • Coronary artery disease involving coronary bypass graft of native heart without angina pectoris   • S/P insertion of non-drug eluting coronary artery stent   • Hypertension   • Hyperlipidemia   • Sleep apnea   • Type 2 diabetes mellitus, without long-term current use of insulin ([facility])   • BMI 35 0-35 9,adult   • Insomnia   • Depression, recurrent ([facility])   • Pyelitis   • Moderate episode of recurrent major depressive disorder [facility])      Past Medical and Surgical History:     Past Medical History:   Diagnosis Date   • Circulation problem    • Coronary artery disease    • Depression    • Diabetes ([facility] [address] )    • Dyspnea on exertion 2019   • Glaucoma    • Hearing problem    • Heart attack ([facility] )    • Heart failure ([facility] )    • Heart valve problem    • High blood pressure    • High cholesterol    • Hypoxia 2022   • Obesity, morbid ([facility] ) 2021   • Open injury of heart    • Sepsis ([facility] ) 2022   • Sleep apnea      Past Surgical History:   Procedure Laterality Date   • CORONARY ARTERY BYPASS GRAFT     • CORONARY STENT PLACEMENT  2010   • CORONARY STENT PLACEMENT  2015   • IR NEPHROSTOMY TUBE CHECK/CHANGE/REPOSITION/REINSERTION/UPSIZE  2022   • IR NEPHROSTOMY TUBE PLACEMENT  2022   • VAGINAL DELIVERY     • VAGINAL DELIVERY        Family History:     Family History   Problem Relation Age of Onset   • Hypertension Father    • Coronary artery disease Father    • Sudden death Father         cardiac 2014      Social History:     Social History     Socioeconomic History   • Marital status:      Spouse name: None   • Number of children: None   • Years of education: None   • Highest education level: None   Occupational History   • None   Tobacco Use   • Smoking status: Former     Types: Cigarettes     Quit date:      Years since quittin 2   • Smokeless tobacco: Never   Vaping Use   • Vaping Use: Never used   Substance and Sexual Activity   • Alcohol use: Yes     Comment: social   • Drug use: Yes     Types: Marijuana   • Sexual activity: None   Other Topics Concern   • None   Social History Narrative   • None     Social Determinants of Health Financial Resource Strain: Low Risk    • Difficulty of Paying Living Expenses: Not hard at all   Food Insecurity: No Food Insecurity   • Worried About Running Out of Food in the Last Year: Never true   • Ran Out of Food in the Last Year: Never true   Transportation Needs: No Transportation Needs   • Lack of Transportation (Medical): No   • Lack of Transportation (Non-Medical): No   Physical Activity: Not on file   Stress: Not on file   Social Connections: Not on file   Intimate Partner Violence: Not on file   Housing Stability: Low Risk    • Unable to Pay for Housing in the Last Year: No   • Number of Places Lived in the Last Year: 1   • Unstable Housing in the Last Year: No      Medications and Allergies:     Current Outpatient Medications   Medication Sig Dispense Refill   • aspirin (ECOTRIN LOW STRENGTH) 81 mg EC tablet Take 81 mg by mouth daily     • carvedilol (COREG) 6 25 mg tablet Take 1 tablet (6 25 mg total) by mouth 2 (two) times a day with meals 180 tablet 3   • clopidogrel (PLAVIX) 75 mg tablet TAKE 1 TABLET BY MOUTH EVERY DAY 90 tablet 1   • Empagliflozin (Jardiance) 25 MG TABS Take 1 tablet (25 mg total) by mouth daily 90 tablet 1   • escitalopram (LEXAPRO) 10 mg tablet TAKE 1 TABLET BY MOUTH TWICE A  tablet 1   • glipiZIDE (GLUCOTROL XL) 10 mg 24 hr tablet TAKE 1 TABLET BY MOUTH EVERY DAY 90 tablet 0   • hydrochlorothiazide (HYDRODIURIL) 12 5 mg tablet TAKE 1 TABLET BY MOUTH EVERY DAY 90 tablet 1   • losartan (COZAAR) 50 mg tablet TAKE 1 TABLET BY MOUTH EVERY DAY 30 tablet 0   • MELATONIN PO Take 10 mg by mouth daily at bedtime     • rosuvastatin (CRESTOR) 40 MG tablet Take 1 tablet (40 mg total) by mouth daily 90 tablet 3   • zolpidem (AMBIEN) 10 mg tablet TAKE 1 TABLET BY MOUTH DAILY AT BEDTIME AS NEEDED FOR SLEEP  90 tablet 1     No current facility-administered medications for this visit       Allergies   Allergen Reactions   • Metformin Diarrhea   • Penicillins Hives      Immunizations: There is no immunization history on file for this patient  Health Maintenance:         Topic Date Due   • Breast Cancer Screening: Mammogram  Never done   • Colorectal Cancer Screening  Never done   • Cervical Cancer Screening  12/13/2027   • HIV Screening  Completed   • Hepatitis C Screening  Completed         Topic Date Due   • COVID-19 Vaccine (1) Never done   • Pneumococcal Vaccine: Pediatrics (0 to 5 Years) and At-Risk Patients (6 to 59 Years) (1 - PCV) Never done   • Influenza Vaccine (1) Never done      Medicare Screening Tests and Risk Assessments:     Cierra Ye is here for her Subsequent Wellness visit  Last Medicare Wellness visit information reviewed, patient interviewed and updates made to the record today  Health Risk Assessment:   Patient rates overall health as good  Patient feels that their physical health rating is same  Patient is satisfied with their life  Eyesight was rated as slightly worse  Hearing was rated as same  Patient feels that their emotional and mental health rating is same  Patients states they are never, rarely angry  Patient states they are sometimes unusually tired/fatigued  Pain experienced in the last 7 days has been none  Patient states that she has experienced no weight loss or gain in last 6 months  Depression Screening:   PHQ-9 Score: 4      Fall Risk Screening: In the past year, patient has experienced: no history of falling in past year      Urinary Incontinence Screening:   Patient has not leaked urine accidently in the last six months  Home Safety:  Patient does not have trouble with stairs inside or outside of their home  Patient has working smoke alarms and has working carbon monoxide detector  Home safety hazards include: none  Nutrition:   Current diet is Regular  Medications:   Patient is currently taking over-the-counter supplements  OTC medications include: see medication list  Patient is able to manage medications       Activities of Daily Living (ADLs)/Instrumental Activities of Daily Living (IADLs):   Walk and transfer into and out of bed and chair?: Yes  Dress and groom yourself?: Yes    Bathe or shower yourself?: Yes    Feed yourself? Yes  Do your laundry/housekeeping?: Yes  Manage your money, pay your bills and track your expenses?: Yes  Make your own meals?: Yes    Do your own shopping?: Yes    Previous Hospitalizations:   Any hospitalizations or ED visits within the last 12 months?: Yes    How many hospitalizations have you had in the last year?: 1-2    Advance Care Planning:   Living will: No    Durable POA for healthcare: No    Advanced directive: No      PREVENTIVE SCREENINGS      Cardiovascular Screening:    General: Screening Not Indicated and History Lipid Disorder      Diabetes Screening:     General: Screening Not Indicated and History Diabetes      Colorectal Cancer Screening:     General: Patient Declines      Breast Cancer Screening:       Due for: Mammogram        Cervical Cancer Screening:    General: Screening Current      Osteoporosis Screening:      Due for: DXA Axial      Abdominal Aortic Aneurysm (AAA) Screening:        General: Screening Not Indicated      Lung Cancer Screening:     General: Screening Not Indicated      Hepatitis C Screening:    General: Screening Current    No results found  Physical Exam:     /72   Pulse 67   Temp 97 7 °F (36 5 °C)   Ht 5' 1" (1 549 m)   Wt 84 1 kg (185 lb 6 4 oz)   BMI 35 03 kg/m²       Physical Exam  Vitals reviewed  Constitutional:       General: She is not in acute distress  Appearance: Normal appearance  She is obese  HENT:      Head: Normocephalic  Eyes:      General: No scleral icterus  Conjunctiva/sclera: Conjunctivae normal    Neck:      Vascular: No carotid bruit  Cardiovascular:      Rate and Rhythm: Normal rate and regular rhythm  Heart sounds: No murmur heard  Pulmonary:      Effort: Pulmonary effort is normal  No respiratory distress  Breath sounds: Normal breath sounds  No wheezing  Musculoskeletal:         General: Normal range of motion  Cervical back: Normal range of motion  Right lower leg: No edema  Left lower leg: No edema  Skin:     General: Skin is warm and dry  Neurological:      General: No focal deficit present  Mental Status: She is alert and oriented to person, place, and time  Gait: Gait normal    Psychiatric:         Mood and Affect: Mood normal          Behavior: Behavior normal          Thought Content: Thought content normal          Judgment: Judgment normal           Dalia Esters, CRNP       BMI Counseling: Body mass index is 35 03 kg/m²  The BMI is above normal  Nutrition recommendations include 3-5 servings of fruits/vegetables daily, reducing fast food intake, consuming healthier snacks, decreasing soda and/or juice intake, moderation in carbohydrate intake and reducing intake of cholesterol  Exercise recommendations include exercising 3-5 times per week

## 2023-03-23 NOTE — ASSESSMENT & PLAN NOTE
Mood stable on daily lexapro - will continue same dose as she desires  Discussed option to increase dose and/or start therapy if she wishes  Return in 3 months for next follow up - call sooner w/concerns

## 2023-03-23 NOTE — ASSESSMENT & PLAN NOTE
Lab Results   Component Value Date    HGBA1C 11 1 (H) 03/20/2023     A1C much higher than previous (7 3%), likely due to dietary indiscretion and sedentary lifestyle  Lengthy discussion w/pt re: necessary diet changes and need to incorporate regular exercise  Recommend and she is agreeable to starting jardiance daily   Continue glipizide daily in addition  Recheck A1C in 3 months - will refer to endocrine if no improvement  Offered consult to diabetic education - she declined  Eye exam UTD  Update foot exam at next appt in [place]

## 2023-03-23 NOTE — ASSESSMENT & PLAN NOTE
Stable BP control on meds   Continue same doses as rx'd  Return in 3 months for next recheck  See cardiology next week as scheduled

## 2023-03-31 ENCOUNTER — OFFICE VISIT (OUTPATIENT)
Dept: CARDIOLOGY CLINIC | Facility: CLINIC | Age: 58
End: 2023-03-31

## 2023-03-31 VITALS
WEIGHT: 184 LBS | BODY MASS INDEX: 34.74 KG/M2 | SYSTOLIC BLOOD PRESSURE: 118 MMHG | HEART RATE: 69 BPM | DIASTOLIC BLOOD PRESSURE: 76 MMHG | HEIGHT: 61 IN

## 2023-03-31 DIAGNOSIS — Z95.1 S/P CABG X 2: ICD-10-CM

## 2023-03-31 DIAGNOSIS — E11.65 TYPE 2 DIABETES MELLITUS WITH HYPERGLYCEMIA, WITHOUT LONG-TERM CURRENT USE OF INSULIN (HCC): ICD-10-CM

## 2023-03-31 DIAGNOSIS — I25.10 CORONARY ARTERY DISEASE INVOLVING NATIVE CORONARY ARTERY OF NATIVE HEART WITHOUT ANGINA PECTORIS: Primary | ICD-10-CM

## 2023-03-31 DIAGNOSIS — Z95.5 S/P INSERTION OF NON-DRUG ELUTING CORONARY ARTERY STENT: ICD-10-CM

## 2023-03-31 DIAGNOSIS — E78.2 MIXED HYPERLIPIDEMIA: ICD-10-CM

## 2023-03-31 DIAGNOSIS — I25.810 CORONARY ARTERY DISEASE INVOLVING CORONARY BYPASS GRAFT OF NATIVE HEART WITHOUT ANGINA PECTORIS: ICD-10-CM

## 2023-03-31 DIAGNOSIS — I10 PRIMARY HYPERTENSION: ICD-10-CM

## 2023-03-31 DIAGNOSIS — G47.37 CENTRAL SLEEP APNEA DUE TO MEDICAL CONDITION: ICD-10-CM

## 2023-03-31 NOTE — PROGRESS NOTES
Cardiology Follow-up    Haleigh Jay  [id number]  1965  [address] [facility] [address] [address] [phone]    1  Coronary artery disease involving native coronary artery of native heart without angina pectoris  Empagliflozin (Jardiance) 25 MG TABS      2  Coronary artery disease involving coronary bypass graft of native heart without angina pectoris        3  S/P CABG x 2        4  S/P insertion of non-drug eluting coronary artery stent        5  Mixed hyperlipidemia        6  Primary hypertension        7  Central sleep apnea due to medical condition        8  Type 2 diabetes mellitus with hyperglycemia, without long-term current use of insulin (HCC)  Empagliflozin (Jardiance) 25 MG TABS            Discussion/Summary:    1  CAD - Lyla Perez had drug-eluting stent placed to her left circumflex in 2011, CABG x2 in 2012, and then a drug-eluting stent placed to her SVG to RCA graft in 2015  She has done well since  We did updated testing back in November 2019 which was for the most part unremarkable  She has a fixed inferior defect, that likely could be from diaphragmatic attenuation artifact or a small scar  No ischemia seen  She has normal LV systolic function  No testing is needed at this time  We will see her back in 1 year  2   Hypertension - Her blood pressure is under good control  No changes were made  She should periodically check her blood pressure at home  3   Hypercholesterolemia - She was switched to rosuvastatin 40 mg daily  Her LDL is near goal at 75     4   GABI - She went back on CPAP around our last visit, and she is intermittently on it now  She feels as if she needs some adjustment with this  I referred her to Pulmonary/Sleep Medicine  HPI:    Mrs Deshaun Kearney comes in for follow-up given her cardiac history    Lyla Perez had coronary artery disease diagnosed back in 2011  In January 2011 she had a drug-eluting stent placed to the left circumflex  Then in 2012 she had CABG x2 with a LIMA to the LAD and a SVG to RCA  She had this work done at [facility]   She was then following with [facility], in 2015 had a cardiac catheterization that showed critical stenosis in the distal portion of her vein graft to her RCA  She received a drug-eluting stent to this region  She has normal LV systolic function without valve disease by echocardiogram earlier this year  She does have obstructive sleep apnea and was started on CPAP  But currently is not using it  At our initial consultation Adrienne Cornelius was noticing some increasing fatigue, shortness of breath and exercise intolerance  She was off CPAP as she had moved from [facility] and had not on packed it as of yet  We did updated testing that included a stress nuclear study and echocardiogram   Echocardiogram showed normal LV systolic function without significant valve disease  Stress nuclear study showed a fixed inferior defect, either representing an old infarct versus attenuation artifact  No ischemia seen  She had another echocardiogram last year that showed no significant change  Adrienne Cornelius has not seen me in over 2 years, but did come to see our office about 7 months ago  She had been in the hospital prior to that with nephrolithiasis  She denies any cardiac symptoms  No chest pain or any symptoms of angina  She denies of breath or any signs/symptoms of CHF  No palpitations, lightheadedness or syncope  One issue has been her diabetes and uncontrolled blood sugars  Her most recent hemoglobin A1c was 11        Patient Active Problem List   Diagnosis   • Coronary artery disease involving native coronary artery of native heart without angina pectoris   • S/P CABG x 2   • Coronary artery disease involving coronary bypass graft of native heart without angina pectoris   • S/P insertion of non-drug eluting coronary artery stent   • Hypertension   • Hyperlipidemia   • Sleep apnea   • Type 2 diabetes mellitus, without long-term current use of insulin ([facility])   • BMI 35 0-35 9,adult   • Insomnia   • Depression, recurrent ([facility])   • Pyelitis   • Moderate episode of recurrent major depressive disorder ([facility])     Past Medical History:   Diagnosis Date   • Circulation problem    • Coronary artery disease    • Depression    • Diabetes ([place] [address] )    • Dyspnea on exertion 2019   • Glaucoma    • Hearing problem    • Heart attack ([place] [address] )    • Heart failure ([facility])    • Heart valve problem    • High blood pressure    • High cholesterol    • Hypoxia 2022   • Obesity, morbid ([place] [address] ) 2021   • Open injury of heart    • Sepsis ([place] [address] ) 2022   • Sleep apnea      Social History     Socioeconomic History   • Marital status:      Spouse name: Not on file   • Number of children: Not on file   • Years of education: Not on file   • Highest education level: Not on file   Occupational History   • Not on file   Tobacco Use   • Smoking status: Former     Types: Cigarettes     Quit date:      Years since quittin 2   • Smokeless tobacco: Never   Vaping Use   • Vaping Use: Never used   Substance and Sexual Activity   • Alcohol use: Yes     Comment: social   • Drug use: Yes     Types: Marijuana   • Sexual activity: Not on file   Other Topics Concern   • Not on file   Social History Narrative   • Not on file     Social Determinants of Health     Financial Resource Strain: Low Risk    • Difficulty of Paying Living Expenses: Not hard at all   Food Insecurity: No Food Insecurity   • Worried About Running Out of Food in the Last Year: Never true   • Ran Out of Food in the Last Year: Never true   Transportation Needs: No Transportation Needs   • Lack of Transportation (Medical): No   • Lack of Transportation (Non-Medical):  No   Physical Activity: Not on file   Stress: Not on file   Social Connections: Not on file   Intimate Partner Violence: Not on file   Housing Stability: Low Risk    • Unable to Pay for Housing in the Last Year: No   • Number of Places Lived in the Last Year: 1   • Unstable Housing in the Last Year: No      Family History   Problem Relation Age of Onset   • Hypertension Father    • Coronary artery disease Father    • Sudden death Father         cardiac 2014     Past Surgical History:   Procedure Laterality Date   • CORONARY ARTERY BYPASS GRAFT  2011   • CORONARY STENT PLACEMENT  2010 January   • CORONARY STENT PLACEMENT  2015 June   • IR NEPHROSTOMY TUBE CHECK/CHANGE/REPOSITION/REINSERTION/UPSIZE  8/18/2022   • IR NEPHROSTOMY TUBE PLACEMENT  7/27/2022   • VAGINAL DELIVERY  1987   • VAGINAL DELIVERY  1989       Current Outpatient Medications:   •  aspirin (ECOTRIN LOW STRENGTH) 81 mg EC tablet, Take 81 mg by mouth daily, Disp: , Rfl:   •  carvedilol (COREG) 6 25 mg tablet, Take 1 tablet (6 25 mg total) by mouth 2 (two) times a day with meals, Disp: 180 tablet, Rfl: 3  •  Empagliflozin (Jardiance) 25 MG TABS, Take 1 tablet (25 mg total) by mouth every morning, Disp: 30 tablet, Rfl: 6  •  escitalopram (LEXAPRO) 10 mg tablet, TAKE 1 TABLET BY MOUTH TWICE A DAY, Disp: 180 tablet, Rfl: 1  •  glipiZIDE (GLUCOTROL XL) 10 mg 24 hr tablet, TAKE 1 TABLET BY MOUTH EVERY DAY, Disp: 90 tablet, Rfl: 0  •  hydrochlorothiazide (HYDRODIURIL) 12 5 mg tablet, TAKE 1 TABLET BY MOUTH EVERY DAY, Disp: 90 tablet, Rfl: 1  •  losartan (COZAAR) 50 mg tablet, TAKE 1 TABLET BY MOUTH EVERY DAY, Disp: 30 tablet, Rfl: 0  •  MELATONIN PO, Take 10 mg by mouth daily at bedtime, Disp: , Rfl:   •  rosuvastatin (CRESTOR) 40 MG tablet, Take 1 tablet (40 mg total) by mouth daily, Disp: 90 tablet, Rfl: 3  •  zolpidem (AMBIEN) 10 mg tablet, TAKE 1 TABLET BY MOUTH DAILY AT BEDTIME AS NEEDED FOR SLEEP , Disp: 90 tablet, Rfl: 1  Allergies   Allergen Reactions   • Metformin Diarrhea   • Penicillins Hives     Vitals:    03/31/23 0853   BP: 118/76   BP Location: "Right arm   Patient Position: Sitting   Cuff Size: Standard   Pulse: 69   Weight: 83 5 kg (184 lb)   Height: 5' 1\" (1 549 m)       Labs:  Lab Results   Component Value Date    K 3 9 03/20/2023     03/20/2023    CO2 30 03/20/2023    BUN 13 03/20/2023    CREATININE 0 94 03/20/2023    CALCIUM 9 7 03/20/2023     Lab Results   Component Value Date    WBC 8 86 03/20/2023    HGB 14 6 03/20/2023    HCT 44 0 03/20/2023    MCV 87 03/20/2023     03/20/2023       Imaging:  ECG today shows sinus bradycardia, rightward axis with nonspecific ST and T-wave changes  STRESS NUCLEAR (11/2019):  SUMMARY:  -  Stress results: Duration of exercise was 6 min and 45 sec  Target heart rate was not achieved  The patient experienced chest pain during stress; pain resolved spontaneously  -  ECG conclusions: The stress ECG was negative for ischemia and normal   -  Perfusion imaging: There was a small to moderate, moderately severe, fixed myocardial perfusion defect of the inferolateral wall  -  Gated SPECT: The calculated left ventricular ejection fraction was 67 %  Left ventricular ejection fraction was within normal limits by visual estimate  There was no left ventricular regional abnormality      IMPRESSIONS: Abnormal study after maximal exercise without reproduction of symptoms  There was a small infarct in the inferolateral region  Left ventricular systolic function was normal       ECHO (8/24/2022): •  Left Ventricle: Left ventricular cavity size is normal  Wall thickness is normal  The left ventricular ejection fraction is 55%  Systolic function is normal  Wall motion is normal  Diastolic function is mildly abnormal, consistent with grade I (abnormal) relaxation  Review of Systems:  Review of Systems   Constitutional: Positive for fatigue  HENT: Negative  Eyes: Negative  Respiratory: Positive for shortness of breath  Cardiovascular: Positive for palpitations and leg swelling     Gastrointestinal: " "Negative  Musculoskeletal: Negative  Skin: Negative  Allergic/Immunologic: Negative  Neurological: Negative  Hematological: Negative  Psychiatric/Behavioral: Negative  All other systems reviewed and are negative  Vitals:    03/31/23 0853   BP: 118/76   BP Location: Right arm   Patient Position: Sitting   Cuff Size: Standard   Pulse: 69   Weight: 83 5 kg (184 lb)   Height: 5' 1\" (1 549 m)     Physical Exam:  Physical Exam  Vitals and nursing note reviewed  Constitutional:       Appearance: She is well-developed  HENT:      Head: Normocephalic and atraumatic  Eyes:      General: No scleral icterus  Right eye: No discharge  Left eye: No discharge  Pupils: Pupils are equal, round, and reactive to light  Neck:      Thyroid: No thyromegaly  Vascular: No JVD  Cardiovascular:      Rate and Rhythm: Normal rate and regular rhythm  No extrasystoles are present  Pulses: Normal pulses  No decreased pulses  Heart sounds: Normal heart sounds, S1 normal and S2 normal  No murmur heard  No friction rub  No gallop  Pulmonary:      Effort: Pulmonary effort is normal  No respiratory distress  Breath sounds: Normal breath sounds  No wheezing or rales  Abdominal:      General: Bowel sounds are normal  There is no distension  Palpations: Abdomen is soft  Tenderness: There is no abdominal tenderness  Musculoskeletal:         General: No tenderness or deformity  Normal range of motion  Cervical back: Normal range of motion and neck supple  Left lower leg: Edema present  Skin:     General: Skin is warm and dry  Findings: No rash  Neurological:      Mental Status: She is alert and oriented to person, place, and time  Cranial Nerves: No cranial nerve deficit  Psychiatric:         Thought Content:  Thought content normal          Judgment: Judgment normal        Counseling / Coordination of Care  Total office time spent today 25 " minutes  Greater than 50% of total time was spent with the patient and / or family counseling and / or coordination of care

## 2023-05-06 DIAGNOSIS — G47.00 INSOMNIA, UNSPECIFIED TYPE: ICD-10-CM

## 2023-05-08 RX ORDER — ZOLPIDEM TARTRATE 10 MG/1
TABLET ORAL
Qty: 90 TABLET | Refills: 0 | Status: SHIPPED | OUTPATIENT
Start: 2023-05-08

## 2023-05-11 DIAGNOSIS — I10 ESSENTIAL HYPERTENSION: ICD-10-CM

## 2023-05-11 RX ORDER — LOSARTAN POTASSIUM 50 MG/1
TABLET ORAL
Qty: 30 TABLET | Refills: 0 | Status: SHIPPED | OUTPATIENT
Start: 2023-05-11

## 2023-05-22 DIAGNOSIS — I10 ESSENTIAL HYPERTENSION: ICD-10-CM

## 2023-05-23 RX ORDER — HYDROCHLOROTHIAZIDE 12.5 MG/1
TABLET ORAL
Qty: 90 TABLET | Refills: 1 | Status: SHIPPED | OUTPATIENT
Start: 2023-05-23

## 2023-05-30 DIAGNOSIS — I10 ESSENTIAL HYPERTENSION: ICD-10-CM

## 2023-05-30 RX ORDER — CARVEDILOL 6.25 MG/1
6.25 TABLET ORAL 2 TIMES DAILY WITH MEALS
Qty: 180 TABLET | Refills: 3 | Status: SHIPPED | OUTPATIENT
Start: 2023-05-30

## 2023-06-10 DIAGNOSIS — I10 ESSENTIAL HYPERTENSION: ICD-10-CM

## 2023-06-10 RX ORDER — LOSARTAN POTASSIUM 50 MG/1
TABLET ORAL
Qty: 30 TABLET | Refills: 0 | Status: SHIPPED | OUTPATIENT
Start: 2023-06-10

## 2023-06-19 ENCOUNTER — APPOINTMENT (OUTPATIENT)
Dept: LAB | Facility: HOSPITAL | Age: 58
End: 2023-06-19
Payer: MEDICARE

## 2023-06-19 DIAGNOSIS — E11.65 TYPE 2 DIABETES MELLITUS WITH HYPERGLYCEMIA, WITHOUT LONG-TERM CURRENT USE OF INSULIN (HCC): ICD-10-CM

## 2023-06-19 LAB
ALBUMIN SERPL BCP-MCNC: 3.6 G/DL (ref 3.5–5)
ALP SERPL-CCNC: 70 U/L (ref 46–116)
ALT SERPL W P-5'-P-CCNC: 32 U/L (ref 12–78)
ANION GAP SERPL CALCULATED.3IONS-SCNC: 1 MMOL/L (ref 4–13)
AST SERPL W P-5'-P-CCNC: 18 U/L (ref 5–45)
BILIRUB SERPL-MCNC: 0.3 MG/DL (ref 0.2–1)
BUN SERPL-MCNC: 19 MG/DL (ref 5–25)
CALCIUM SERPL-MCNC: 9.2 MG/DL (ref 8.3–10.1)
CHLORIDE SERPL-SCNC: 110 MMOL/L (ref 96–108)
CO2 SERPL-SCNC: 29 MMOL/L (ref 21–32)
CREAT SERPL-MCNC: 0.93 MG/DL (ref 0.6–1.3)
GFR SERPL CREATININE-BSD FRML MDRD: 68 ML/MIN/1.73SQ M
GLUCOSE P FAST SERPL-MCNC: 160 MG/DL (ref 65–99)
POTASSIUM SERPL-SCNC: 4.1 MMOL/L (ref 3.5–5.3)
PROT SERPL-MCNC: 7.5 G/DL (ref 6.4–8.4)
SODIUM SERPL-SCNC: 140 MMOL/L (ref 135–147)

## 2023-06-19 PROCEDURE — 83036 HEMOGLOBIN GLYCOSYLATED A1C: CPT

## 2023-06-19 PROCEDURE — 36415 COLL VENOUS BLD VENIPUNCTURE: CPT

## 2023-06-19 PROCEDURE — 80053 COMPREHEN METABOLIC PANEL: CPT

## 2023-06-20 LAB
EST. AVERAGE GLUCOSE BLD GHB EST-MCNC: 223 MG/DL
HBA1C MFR BLD: 9.4 %

## 2023-06-23 ENCOUNTER — OFFICE VISIT (OUTPATIENT)
Dept: FAMILY MEDICINE CLINIC | Facility: HOSPITAL | Age: 58
End: 2023-06-23
Payer: MEDICARE

## 2023-06-23 VITALS
WEIGHT: 178.6 LBS | SYSTOLIC BLOOD PRESSURE: 128 MMHG | BODY MASS INDEX: 33.72 KG/M2 | HEIGHT: 61 IN | DIASTOLIC BLOOD PRESSURE: 70 MMHG | HEART RATE: 66 BPM | TEMPERATURE: 96.7 F

## 2023-06-23 DIAGNOSIS — E78.2 MIXED HYPERLIPIDEMIA: ICD-10-CM

## 2023-06-23 DIAGNOSIS — I10 PRIMARY HYPERTENSION: ICD-10-CM

## 2023-06-23 DIAGNOSIS — E11.65 TYPE 2 DIABETES MELLITUS WITH HYPERGLYCEMIA, WITHOUT LONG-TERM CURRENT USE OF INSULIN (HCC): ICD-10-CM

## 2023-06-23 DIAGNOSIS — I25.10 CORONARY ARTERY DISEASE INVOLVING NATIVE CORONARY ARTERY OF NATIVE HEART WITHOUT ANGINA PECTORIS: ICD-10-CM

## 2023-06-23 DIAGNOSIS — J44.9 CHRONIC OBSTRUCTIVE PULMONARY DISEASE, UNSPECIFIED COPD TYPE (HCC): Primary | ICD-10-CM

## 2023-06-23 DIAGNOSIS — R39.15 URINARY URGENCY: ICD-10-CM

## 2023-06-23 LAB
SL AMB  POCT GLUCOSE, UA: ABNORMAL
SL AMB LEUKOCYTE ESTERASE,UA: NEGATIVE
SL AMB POCT BILIRUBIN,UA: NEGATIVE
SL AMB POCT BLOOD,UA: NEGATIVE
SL AMB POCT CLARITY,UA: CLEAR
SL AMB POCT COLOR,UA: YELLOW
SL AMB POCT KETONES,UA: NEGATIVE
SL AMB POCT NITRITE,UA: NEGATIVE
SL AMB POCT PH,UA: 6
SL AMB POCT SPECIFIC GRAVITY,UA: 1.01
SL AMB POCT URINE PROTEIN: NEGATIVE
SL AMB POCT UROBILINOGEN: NEGATIVE

## 2023-06-23 PROCEDURE — 81002 URINALYSIS NONAUTO W/O SCOPE: CPT | Performed by: NURSE PRACTITIONER

## 2023-06-23 PROCEDURE — 99215 OFFICE O/P EST HI 40 MIN: CPT | Performed by: NURSE PRACTITIONER

## 2023-06-23 NOTE — PATIENT INSTRUCTIONS
Type 2 Diabetes Management for Adults   AMBULATORY CARE:   Type 2 diabetes  is a disease that affects how your body uses glucose (sugar)  Either your body cannot make enough insulin, or it cannot use the insulin correctly  It is important to keep diabetes controlled to prevent damage to your heart, blood vessels, and other organs  Management will help you feel well and enjoy your daily activities  Your diabetes care team providers can help you make a plan to fit diabetes care into your schedule  Your plan can change over time to fit your needs and your family's needs  Have someone call your local emergency number (911 in the [address]) if:   • You cannot be woken  • You have signs of diabetic ketoacidosis:     ? confusion, fatigue    ? vomiting    ? rapid heartbeat    ? fruity smelling breath    ? extreme thirst    ? dry mouth and skin    • You have any of the following signs of a heart attack:      ? Squeezing, pressure, or pain in your chest    ? You may  also have any of the following:     - Discomfort or pain in your back, neck, jaw, stomach, or arm    - Shortness of breath    - Nausea or vomiting    - Lightheadedness or a sudden cold sweat    • You have any of the following signs of a stroke:      ? Numbness or drooping on one side of your face     ? Weakness in an arm or leg    ? Confusion or difficulty speaking    ? Dizziness, a severe headache, or vision loss    Call your doctor or diabetes care team provider if:   • You have a sore or wound that will not heal     • You have a change in the amount you urinate  • Your blood sugar levels are higher than your target goals  • You often have lower blood sugar levels than your target goals  • Your skin is red, dry, warm, or swollen  • You have trouble coping with diabetes, or you feel anxious or depressed  • You have questions or concerns about your condition or care      What you need to know about high blood sugar levels:  High blood sugar levels may not cause any symptoms  You may feel more thirsty or urinate more often than usual  Over time, high blood sugar levels can damage your nerves, blood vessels, tissues, and organs  The following can increase your blood sugar levels:  • Large meals or large amounts of carbohydrates at one time    • Less physical activity    • Stress    • Illness    • A lower dose of diabetes medicine or insulin, or a late dose    What you need to know about low blood sugar levels:  Symptoms include feeling shaky, dizzy, irritable, or confused  You can prevent symptoms by keeping your blood sugar levels from going too low  • Treat a low blood sugar level right away:      ? Drink 4 ounces of juice or have 1 tube of glucose gel  ? Check your blood sugar level again 10 to 15 minutes later  ? When the level goes back to normal, eat a meal or snack to prevent another decrease  • Keep glucose gel, raisins, or hard candy with you at all times to treat a low blood sugar level  • Your blood sugar level can get too low if you take diabetes medicine or insulin and do not eat enough food  • If you use insulin, check your blood sugar level before you exercise  ? If your blood sugar level is below 100 mg/dL, eat 4 crackers or 2 ounces of raisins, or drink 4 ounces of juice  ? Check your level every 30 minutes if you exercise longer than 1 hour  ? You may need a snack during or after exercise  What you can do to manage your blood sugar levels:   • Check your blood sugar levels as directed and as needed  Several items are available to use to check your levels  You may need to check by testing a drop of blood in a glucose monitor  You may instead be given a continuous glucose monitoring (CGM) device  The device is worn at all times  The CGM checks your blood sugar level every 5 minutes  It sends results to an electronic device such as a smart phone  A CGM can be used with or without an insulin pump   You and your diabetes care team providers will decide on the best method for you  The goal for blood sugar levels before meals  is between 80 and 130 mg/dL and 2 hours after eating  is lower than 180 mg/dL  • Make healthy food choices  Work with a dietitian to develop a meal plan that works for you and your schedule  A dietitian can help you learn how to eat the right amount of carbohydrates during your meals and snacks  Carbohydrates can raise your blood sugar level if you eat too many at one time  Examples of foods that contain carbohydrates are breads, cereals, rice, pasta, and sweets  • Eat high-fiber foods as directed  Fiber helps improve blood sugar levels  Fiber also lowers your risk for heart disease and other problems diabetes can cause  Examples of high-fiber foods include vegetables, whole-grain bread, and beans such as devine beans  Your dietitian can tell you how much fiber to have each day  • Get regular physical activity  Physical activity can help you get to your target blood sugar level goal and manage your weight  Get at least 150 minutes of moderate to vigorous aerobic physical activity each week  Do not miss more than 2 days in a row  Do not sit longer than 30 minutes at a time  Your healthcare provider can help you create an activity plan  The plan can include the best activities for you and can help you build your strength and endurance  • Maintain a healthy weight  Ask your team what a healthy weight is for you  A healthy weight can help you control diabetes and prevent heart disease  Ask your team to help you create a weight loss plan, if needed  Weight loss can help make a difference in managing diabetes  Your team will help you set a weight-loss goal, such as 10 to 15 pounds, or 5% of your extra weight  Together you and your team can set manageable weight loss goals  • Take your diabetes medicine or insulin as directed    You may need diabetes medicine, insulin, or both to help control your blood sugar levels  Your healthcare provider will teach you how and when to take your diabetes medicine or insulin  You will also be taught about side effects oral diabetes medicine can cause  Insulin may be injected or given through a pump or pen  You and your providers will decide on the best method for you:    ? An insulin pump  is an implanted device that gives your insulin 24 hours a day  An insulin pump prevents the need for multiple insulin injections in a day  ? An insulin pen  is a device prefilled with the right amount of insulin  ? You and your family members will be taught how to draw up and give insulin  if this is the best method for you  Your providers will also teach you how to dispose of needles and syringes  ? You will learn how much insulin you need  and when to give it  You will be taught when not to give insulin  You will also be taught what to do if your blood sugar level drops too low  This may happen if you take insulin and do not eat the right amount of carbohydrates  More ways to manage type 2 diabetes:   • Wear medical alert identification  Wear medical alert jewelry or carry a card that says you have diabetes  Ask your provider where to get these items  • Do not smoke  Nicotine and other chemicals in cigarettes and cigars can cause lung and blood vessel damage  It also makes it more difficult to manage your diabetes  Ask your provider for information if you currently smoke and need help to quit  Do not use e-cigarettes or smokeless tobacco in place of cigarettes or to help you quit  They still contain nicotine  • Check your feet each day for cuts, scratches, calluses, or other wounds  Look for redness and swelling, and feel for warmth  Wear shoes that fit well  Check your shoes for rocks or other objects that can hurt your feet  Do not walk barefoot or wear shoes without socks   Wear cotton socks to help keep your feet dry  • Ask about vaccines you may need  You have a higher risk for serious illness if you get the flu, pneumonia, COVID-19, or hepatitis  Ask your provider if you should get vaccines to prevent these or other diseases, and when to get the vaccines  • Talk to your provider if you become stressed about diabetes care  Sometimes being able to fit diabetes care into your life can cause increased stress  The stress can cause you not to take care of yourself properly  Your care team providers can help by offering tips about self-care  Your providers may suggest you talk to a mental health provider who can listen and offer help with self-care issues  • Have your A1c checked as directed  Your provider may check your A1c every 3 months, or 2 times each year if your diabetes is controlled  An A1c test shows the average amount of sugar in your blood over the past 2 to 3 months  Your provider will tell you what your A1c level should be  • Have screening tests as directed  Your provider may recommend screening for complications of diabetes and other conditions that may develop  Some screenings may begin right away and some may happen within the first 5 years of diagnosis:    ? Examples of diabetes complications  include kidney problems, high cholesterol, high blood pressure, blood vessel problems, eye problems, and sleep apnea  ? You may be screened for a low vitamin B level  if you take oral diabetes medicine for a long time  ? Women of childbearing years may be screened  for polycystic ovarian syndrome (PCOS)  Follow up with your doctor or diabetes care team providers as directed: You may need to have blood tests done before your follow-up visit  The test results will show if changes need to be made in your treatment or self-care  Talk to your provider if you cannot afford your medicine  Write down your questions so you remember to ask them during your visits    © Copyright [employer] 2022 Information is for End User's use only and may not be sold, redistributed or otherwise used for commercial purposes  The above information is an  only  It is not intended as medical advice for individual conditions or treatments  Talk to your doctor, nurse or pharmacist before following any medical regimen to see if it is safe and effective for you

## 2023-06-23 NOTE — PROGRESS NOTES
Name: Bhargav West      : 1965      MRN: [id number]  Encounter Provider: JEANCARLOS Borjas  Encounter Date: 2023   Encounter department: [address] [place] [address]  203     Assessment & Plan     1  Chronic obstructive pulmonary disease, unspecified COPD type ([facility] [address] )  Assessment & Plan:  Pt denies hx of or known diagnosis of  Asymptomatic from a respiratory standpoint      2  Type 2 diabetes mellitus with hyperglycemia, without long-term current use of insulin ([facility])  Assessment & Plan:    Lab Results   Component Value Date    HGBA1C 9 4 (H) 2023     A1C improved but remains above goal despite improved lifestyle efforts  She recently started jardiance as rx'd by cardiology & is compliant w/glipizide as rx'd - advise she continue same doses  Discussed additional med options - she is declining any form of injections at this time (insulin, GLP-1) and has been previously intolerant to and refuses metformin  Recommend & she is agreeable to pursuing diabetic education - consult entered  Continue diabetic diet and regular exercise efforts  Foot exam updated today  Podiatry consult entered  Eye exam & microalbumin UTD  Update labs & return in 3 months - will need additional agent or consult to endocrine if A1C remains above goal      Orders:  -     Comprehensive metabolic panel; Future; Expected date: 2023  -     Hemoglobin A1C; Future; Expected date: 2023  -     Ambulatory referral to Diabetic Education - use to refer for diabetes group classes, individual diabetes education, medical nutrition therapy, device training; Future; Expected date: 2023  -     Lipid Panel with Direct LDL reflex; Future; Expected date: 2023  -     Ambulatory Referral to Podiatry; Future    3  Coronary artery disease involving native coronary artery of native heart without angina pectoris  Assessment & Plan:  Cardiology follow up UTD      4   Primary hypertension  Assessment & Plan:  BP well controlled  Continue same meds as rx'd  Return in 3 months      5  Mixed hyperlipidemia  Assessment & Plan:  Compliant w/high intensity statin as rx'd  Update FLP as ordered before next OV in 3 months    Orders:  -     Lipid Panel with Direct LDL reflex; Future; Expected date: 09/23/2023    6  Urinary urgency  Comments:  in office UA neg for WBCs & nitrates  Orders:  -     POCT urine dip      PCV and colon screening declined  Order reprinted to schedule mammo  Pap smear/gyn exam UTD       Subjective      States she has been well  UTD with cardiology appt and was recently put on jardiance instead of clopidogrel  She wonders if jardiance is reason for recent onset of right hand pain  Former smoker  Denies diagnosis of COPD  States she has stopped eating sweets - donuts and ice cream  Limits her portions of pasta  Is drinking unsweetened tea and flavored water  States she is active and walks a lot  Review of Systems   Constitutional: Negative  Respiratory: Negative  Cardiovascular: Negative  Genitourinary: Positive for frequency (worse at night) and urgency (x1 week)  Negative for dysuria  Musculoskeletal: Positive for arthralgias (right hand recently, she wonders if it is a side effect to jardiance)         Current Outpatient Medications on File Prior to Visit   Medication Sig   • aspirin (ECOTRIN LOW STRENGTH) 81 mg EC tablet Take 81 mg by mouth daily   • carvedilol (COREG) 6 25 mg tablet Take 1 tablet (6 25 mg total) by mouth 2 (two) times a day with meals   • Empagliflozin (Jardiance) 25 MG TABS Take 1 tablet (25 mg total) by mouth every morning   • escitalopram (LEXAPRO) 10 mg tablet TAKE 1 TABLET BY MOUTH TWICE A DAY   • glipiZIDE (GLUCOTROL XL) 10 mg 24 hr tablet TAKE 1 TABLET BY MOUTH EVERY DAY   • hydrochlorothiazide (HYDRODIURIL) 12 5 mg tablet TAKE 1 TABLET BY MOUTH EVERY DAY   • losartan (COZAAR) 50 mg tablet TAKE 1 TABLET BY MOUTH EVERY DAY   • MELATONIN PO Take 10 mg by mouth daily "at bedtime   • rosuvastatin (CRESTOR) 40 MG tablet Take 1 tablet (40 mg total) by mouth daily   • zolpidem (AMBIEN) 10 mg tablet TAKE 1 TABLET BY MOUTH DAILY AT BEDTIME AS NEEDED FOR SLEEP  Objective     /70   Pulse 66   Temp (!) 96 7 °F (35 9 °C)   Ht 5' 1\" (1 549 m)   Wt 81 kg (178 lb 9 6 oz)   BMI 33 75 kg/m²       Physical Exam  Vitals reviewed  Constitutional:       General: She is not in acute distress  Appearance: Normal appearance  HENT:      Head: Normocephalic  Eyes:      General: No scleral icterus  Neck:      Thyroid: No thyromegaly  Vascular: No carotid bruit  Cardiovascular:      Rate and Rhythm: Normal rate and regular rhythm  Pulses: no weak pulses          Dorsalis pedis pulses are 1+ on the right side and 1+ on the left side  Posterior tibial pulses are 1+ on the right side and 1+ on the left side  Pulmonary:      Effort: Pulmonary effort is normal  No respiratory distress  Breath sounds: Normal breath sounds  Musculoskeletal:      Cervical back: Normal range of motion  Right lower leg: No edema  Left lower leg: No edema  Feet:      Right foot:      Skin integrity: No ulcer, skin breakdown, erythema, warmth, callus or dry skin  Left foot:      Skin integrity: No ulcer, skin breakdown, erythema, warmth, callus or dry skin  Lymphadenopathy:      Cervical: No cervical adenopathy  Skin:     General: Skin is warm and dry  Neurological:      General: No focal deficit present  Mental Status: She is alert and oriented to person, place, and time  Psychiatric:         Mood and Affect: Mood normal          Behavior: Behavior normal          Thought Content: Thought content normal          Judgment: Judgment normal           Patient's shoes and socks removed  Right Foot/Ankle   Right Foot Inspection  Skin Exam: skin normal and skin intact   No dry skin, no warmth, no callus, no erythema, no maceration, no abnormal color, no " pre-ulcer, no ulcer and no callus  Toe Exam: right toe deformity (mycotic nails)  Sensory   Vibration: intact  Proprioception: intact  Monofilament testing: intact    Vascular  The right DP pulse is 1+  The right PT pulse is 1+  Right Toe  - Comprehensive Exam  Ecchymosis: none  Arch: normal  Hammertoes: absent  Claw Toes: absent  Swelling: none   Tenderness: none         Left Foot/Ankle  Left Foot Inspection  Skin Exam: skin normal and skin intact  No dry skin, no warmth, no erythema, no maceration, normal color, no pre-ulcer, no ulcer and no callus  Toe Exam: left toe deformity (mycotic nails)  Sensory   Vibration: intact  Proprioception: intact  Monofilament testing: intact    Vascular  The left DP pulse is 1+  The left PT pulse is 1+       Left Toe  - Comprehensive Exam  Ecchymosis: none  Arch: normal  Hammertoes: absent  Claw toes: absent  Swelling: none   Tenderness: none           Assign Risk Category  Deformity present  No loss of protective sensation  No weak pulses  Risk: 0        Carley Rolls, CRNP

## 2023-06-23 NOTE — ASSESSMENT & PLAN NOTE
Lab Results   Component Value Date    HGBA1C 9 4 (H) 06/19/2023     A1C improved but remains above goal despite improved lifestyle efforts  She recently started jardiance as rx'd by cardiology & is compliant w/glipizide as rx'd - advise she continue same doses  Discussed additional med options - she is declining any form of injections at this time (insulin, GLP-1) and has been previously intolerant to and refuses metformin  Recommend & she is agreeable to pursuing diabetic education - consult entered  Continue diabetic diet and regular exercise efforts  Foot exam updated today  Podiatry consult entered     Eye exam & microalbumin UTD  Update labs & return in 3 months - will need additional agent or consult to endocrine if A1C remains above goal

## 2023-08-02 DIAGNOSIS — G47.00 INSOMNIA, UNSPECIFIED TYPE: ICD-10-CM

## 2023-08-03 RX ORDER — ZOLPIDEM TARTRATE 10 MG/1
TABLET ORAL
Qty: 90 TABLET | Refills: 0 | Status: SHIPPED | OUTPATIENT
Start: 2023-08-03

## 2023-08-11 DIAGNOSIS — I10 ESSENTIAL HYPERTENSION: ICD-10-CM

## 2023-08-11 RX ORDER — LOSARTAN POTASSIUM 50 MG/1
TABLET ORAL
Qty: 90 TABLET | Refills: 0 | Status: SHIPPED | OUTPATIENT
Start: 2023-08-11 | End: 2023-09-20

## 2023-08-28 DIAGNOSIS — E78.2 MIXED HYPERLIPIDEMIA: ICD-10-CM

## 2023-08-28 RX ORDER — ROSUVASTATIN CALCIUM 40 MG/1
40 TABLET, COATED ORAL DAILY
Qty: 90 TABLET | Refills: 3 | Status: SHIPPED | OUTPATIENT
Start: 2023-08-28

## 2023-09-06 DIAGNOSIS — I25.10 CORONARY ARTERY DISEASE INVOLVING NATIVE CORONARY ARTERY OF NATIVE HEART WITHOUT ANGINA PECTORIS: ICD-10-CM

## 2023-09-06 DIAGNOSIS — E11.65 TYPE 2 DIABETES MELLITUS WITH HYPERGLYCEMIA, WITHOUT LONG-TERM CURRENT USE OF INSULIN (HCC): ICD-10-CM

## 2023-09-06 RX ORDER — EMPAGLIFLOZIN 25 MG/1
25 TABLET, FILM COATED ORAL EVERY MORNING
Qty: 90 TABLET | Refills: 2 | Status: SHIPPED | OUTPATIENT
Start: 2023-09-06

## 2023-09-08 ENCOUNTER — TELEPHONE (OUTPATIENT)
Dept: CARDIOLOGY CLINIC | Facility: CLINIC | Age: 58
End: 2023-09-08

## 2023-09-08 NOTE — TELEPHONE ENCOUNTER
Pt called -     States she is concerned about side effects she fells may be related to Fiez. Pt states she's been having headaches, feels generally unwell, and has concerns with dying. Pt would like to try stopping medication to see if her symptoms resolve.      Please advise

## 2023-09-12 ENCOUNTER — APPOINTMENT (OUTPATIENT)
Dept: LAB | Facility: HOSPITAL | Age: 58
End: 2023-09-12
Payer: MEDICARE

## 2023-09-12 DIAGNOSIS — E78.2 MIXED HYPERLIPIDEMIA: ICD-10-CM

## 2023-09-12 DIAGNOSIS — E11.65 TYPE 2 DIABETES MELLITUS WITH HYPERGLYCEMIA, WITHOUT LONG-TERM CURRENT USE OF INSULIN (HCC): ICD-10-CM

## 2023-09-12 LAB
ALBUMIN SERPL BCP-MCNC: 4.2 G/DL (ref 3.5–5)
ALP SERPL-CCNC: 72 U/L (ref 34–104)
ALT SERPL W P-5'-P-CCNC: 17 U/L (ref 7–52)
ANION GAP SERPL CALCULATED.3IONS-SCNC: 6 MMOL/L
AST SERPL W P-5'-P-CCNC: 17 U/L (ref 13–39)
BILIRUB SERPL-MCNC: 0.38 MG/DL (ref 0.2–1)
BUN SERPL-MCNC: 22 MG/DL (ref 5–25)
CALCIUM SERPL-MCNC: 9.4 MG/DL (ref 8.4–10.2)
CHLORIDE SERPL-SCNC: 104 MMOL/L (ref 96–108)
CHOLEST SERPL-MCNC: 160 MG/DL
CO2 SERPL-SCNC: 31 MMOL/L (ref 21–32)
CREAT SERPL-MCNC: 0.91 MG/DL (ref 0.6–1.3)
EST. AVERAGE GLUCOSE BLD GHB EST-MCNC: 183 MG/DL
GFR SERPL CREATININE-BSD FRML MDRD: 69 ML/MIN/1.73SQ M
GLUCOSE P FAST SERPL-MCNC: 181 MG/DL (ref 65–99)
HBA1C MFR BLD: 8 %
HDLC SERPL-MCNC: 43 MG/DL
LDLC SERPL CALC-MCNC: 79 MG/DL (ref 0–100)
POTASSIUM SERPL-SCNC: 4.1 MMOL/L (ref 3.5–5.3)
PROT SERPL-MCNC: 7.4 G/DL (ref 6.4–8.4)
SODIUM SERPL-SCNC: 141 MMOL/L (ref 135–147)
TRIGL SERPL-MCNC: 191 MG/DL

## 2023-09-12 PROCEDURE — 83036 HEMOGLOBIN GLYCOSYLATED A1C: CPT

## 2023-09-12 PROCEDURE — 80061 LIPID PANEL: CPT

## 2023-09-12 PROCEDURE — 36415 COLL VENOUS BLD VENIPUNCTURE: CPT

## 2023-09-12 PROCEDURE — 80053 COMPREHEN METABOLIC PANEL: CPT

## 2023-09-13 ENCOUNTER — RA CDI HCC (OUTPATIENT)
Dept: OTHER | Facility: HOSPITAL | Age: 58
End: 2023-09-13

## 2023-09-20 DIAGNOSIS — I10 ESSENTIAL HYPERTENSION: ICD-10-CM

## 2023-09-20 DIAGNOSIS — E11.65 TYPE 2 DIABETES MELLITUS WITH HYPERGLYCEMIA, WITHOUT LONG-TERM CURRENT USE OF INSULIN (HCC): ICD-10-CM

## 2023-09-20 RX ORDER — LOSARTAN POTASSIUM 50 MG/1
TABLET ORAL
Qty: 90 TABLET | Refills: 0 | Status: SHIPPED | OUTPATIENT
Start: 2023-09-20

## 2023-09-20 RX ORDER — GLIPIZIDE 10 MG/1
TABLET, FILM COATED, EXTENDED RELEASE ORAL
Qty: 90 TABLET | Refills: 0 | Status: SHIPPED | OUTPATIENT
Start: 2023-09-20

## 2023-10-30 DIAGNOSIS — G47.00 INSOMNIA, UNSPECIFIED TYPE: ICD-10-CM

## 2023-10-30 RX ORDER — ZOLPIDEM TARTRATE 10 MG/1
TABLET ORAL
Qty: 90 TABLET | Refills: 0 | Status: SHIPPED | OUTPATIENT
Start: 2023-10-30

## 2023-11-20 DIAGNOSIS — F32.A DEPRESSION, UNSPECIFIED DEPRESSION TYPE: ICD-10-CM

## 2023-11-20 DIAGNOSIS — I10 ESSENTIAL HYPERTENSION: ICD-10-CM

## 2023-11-20 RX ORDER — ESCITALOPRAM OXALATE 10 MG/1
TABLET ORAL
Qty: 180 TABLET | Refills: 1 | Status: SHIPPED | OUTPATIENT
Start: 2023-11-20

## 2023-11-20 RX ORDER — HYDROCHLOROTHIAZIDE 12.5 MG/1
TABLET ORAL
Qty: 90 TABLET | Refills: 1 | Status: SHIPPED | OUTPATIENT
Start: 2023-11-20

## 2023-12-04 ENCOUNTER — TELEPHONE (OUTPATIENT)
Dept: FAMILY MEDICINE CLINIC | Facility: HOSPITAL | Age: 58
End: 2023-12-04

## 2023-12-04 DIAGNOSIS — E11.65 TYPE 2 DIABETES MELLITUS WITH HYPERGLYCEMIA, WITHOUT LONG-TERM CURRENT USE OF INSULIN (HCC): Primary | ICD-10-CM

## 2023-12-04 NOTE — TELEPHONE ENCOUNTER
Patient has an appt with you on Thursday and wanted to get blood work done tomorrow. Are you able to put blood work in for patient? Patient said she doesn't need a call unless something is wrong.

## 2023-12-05 ENCOUNTER — APPOINTMENT (OUTPATIENT)
Dept: LAB | Facility: HOSPITAL | Age: 58
End: 2023-12-05
Payer: MEDICARE

## 2023-12-05 DIAGNOSIS — E11.65 TYPE 2 DIABETES MELLITUS WITH HYPERGLYCEMIA, WITHOUT LONG-TERM CURRENT USE OF INSULIN (HCC): ICD-10-CM

## 2023-12-05 LAB
ALBUMIN SERPL BCP-MCNC: 4.5 G/DL (ref 3.5–5)
ALP SERPL-CCNC: 84 U/L (ref 34–104)
ALT SERPL W P-5'-P-CCNC: 19 U/L (ref 7–52)
ANION GAP SERPL CALCULATED.3IONS-SCNC: 11 MMOL/L
AST SERPL W P-5'-P-CCNC: 23 U/L (ref 13–39)
BILIRUB SERPL-MCNC: 0.51 MG/DL (ref 0.2–1)
BUN SERPL-MCNC: 14 MG/DL (ref 5–25)
CALCIUM SERPL-MCNC: 9.6 MG/DL (ref 8.4–10.2)
CHLORIDE SERPL-SCNC: 103 MMOL/L (ref 96–108)
CHOLEST SERPL-MCNC: 149 MG/DL
CO2 SERPL-SCNC: 28 MMOL/L (ref 21–32)
CREAT SERPL-MCNC: 0.91 MG/DL (ref 0.6–1.3)
GFR SERPL CREATININE-BSD FRML MDRD: 69 ML/MIN/1.73SQ M
GLUCOSE P FAST SERPL-MCNC: 212 MG/DL (ref 65–99)
HDLC SERPL-MCNC: 34 MG/DL
LDLC SERPL CALC-MCNC: 85 MG/DL (ref 0–100)
POTASSIUM SERPL-SCNC: 3.9 MMOL/L (ref 3.5–5.3)
PROT SERPL-MCNC: 7.8 G/DL (ref 6.4–8.4)
SODIUM SERPL-SCNC: 142 MMOL/L (ref 135–147)
TRIGL SERPL-MCNC: 152 MG/DL

## 2023-12-05 PROCEDURE — 80061 LIPID PANEL: CPT

## 2023-12-05 PROCEDURE — 36415 COLL VENOUS BLD VENIPUNCTURE: CPT

## 2023-12-05 PROCEDURE — 80053 COMPREHEN METABOLIC PANEL: CPT

## 2023-12-07 ENCOUNTER — OFFICE VISIT (OUTPATIENT)
Dept: FAMILY MEDICINE CLINIC | Facility: HOSPITAL | Age: 58
End: 2023-12-07
Payer: MEDICARE

## 2023-12-07 VITALS
DIASTOLIC BLOOD PRESSURE: 76 MMHG | WEIGHT: 174.6 LBS | HEART RATE: 68 BPM | SYSTOLIC BLOOD PRESSURE: 126 MMHG | HEIGHT: 61 IN | BODY MASS INDEX: 32.97 KG/M2 | TEMPERATURE: 98.5 F

## 2023-12-07 DIAGNOSIS — I25.810 CORONARY ARTERY DISEASE INVOLVING CORONARY BYPASS GRAFT OF NATIVE HEART WITHOUT ANGINA PECTORIS: ICD-10-CM

## 2023-12-07 DIAGNOSIS — I10 PRIMARY HYPERTENSION: ICD-10-CM

## 2023-12-07 DIAGNOSIS — F33.9 DEPRESSION, RECURRENT (HCC): ICD-10-CM

## 2023-12-07 DIAGNOSIS — E78.2 MIXED HYPERLIPIDEMIA: ICD-10-CM

## 2023-12-07 DIAGNOSIS — E11.65 TYPE 2 DIABETES MELLITUS WITH HYPERGLYCEMIA, WITHOUT LONG-TERM CURRENT USE OF INSULIN (HCC): Primary | ICD-10-CM

## 2023-12-07 PROBLEM — F33.1 MODERATE EPISODE OF RECURRENT MAJOR DEPRESSIVE DISORDER (HCC): Status: RESOLVED | Noted: 2023-03-23 | Resolved: 2023-12-07

## 2023-12-07 PROBLEM — N12 PYELITIS: Status: RESOLVED | Noted: 2022-07-27 | Resolved: 2023-12-07

## 2023-12-07 LAB — SL AMB POCT HEMOGLOBIN AIC: 7.9 (ref ?–6.5)

## 2023-12-07 PROCEDURE — 83036 HEMOGLOBIN GLYCOSYLATED A1C: CPT | Performed by: NURSE PRACTITIONER

## 2023-12-07 PROCEDURE — 99214 OFFICE O/P EST MOD 30 MIN: CPT | Performed by: NURSE PRACTITIONER

## 2023-12-07 RX ORDER — BUPROPION HYDROCHLORIDE 150 MG/1
150 TABLET ORAL EVERY MORNING
Qty: 90 TABLET | Refills: 1 | Status: SHIPPED | OUTPATIENT
Start: 2023-12-07 | End: 2024-12-01

## 2023-12-07 NOTE — PROGRESS NOTES
Name: Deshawn Boyd      : 1965      MRN: [id number]  Encounter Provider: JEANCARLOS Mejia  Encounter Date: 2023   Encounter department: [address] [place], [place] [address] [place] [address]     Assessment & Plan     1. Type 2 diabetes mellitus with hyperglycemia, without long-term current use of insulin (HCC)  Assessment & Plan:    Lab Results   Component Value Date    HGBA1C 7.9 (A) 2023     A1C slightly improved to 7.9% but remains above goal  Given inadequate control & previous med intolerances, will refer to endocrine for further treatment  Commended her healthier lifestyle efforts & encouraged ongoing  Eye and foot exams UTD  Microalbumin UTD - continue ARB daily  Continue statin daily  Update labs in 6 months as ordered      Orders:  -     Ambulatory Referral to Endocrinology; Future  -     POCT hemoglobin A1c  -     Comprehensive metabolic panel; Future; Expected date: 2024  -     Lipid Panel with Direct LDL reflex; Future; Expected date: 2024  -     Albumin / creatinine urine ratio; Future; Expected date: 2024  -     CBC and differential; Future; Expected date: 2024  -     TSH, 3rd generation with Free T4 reflex; Future; Expected date: 2024    2. Primary hypertension  Assessment & Plan:  Well controlled/stable on current meds  Continue same doses as rx'd  Return in 3 months for next recheck      3. Coronary artery disease involving coronary bypass graft of native heart without angina pectoris  Assessment & Plan:  Clinically stable as managed by cardiology  Maintain follow up as recommended      4. Depression, recurrent ([address])  Assessment & Plan:  PHQ score elevated at 11 on 20mg lexapro daily  Recommend and she is agreeable to adding additional agent - rx for bupropion issued  Return in 3 months for follow up - call sooner w/any mood concerns    Orders:  -     buPROPion (WELLBUTRIN XL) 150 mg 24 hr tablet;  Take 1 tablet (150 mg total) by mouth every morning  - CBC and differential; Future; Expected date: 06/04/2024  -     TSH, 3rd generation with Free T4 reflex; Future; Expected date: 06/04/2024    5. Mixed hyperlipidemia  Assessment & Plan:  LDL nearly at goal on high intensity statin  Continue same dose as rx'd by cardiology & maximize lifestyle efforts  Recheck in 6 months    Orders:  -     Comprehensive metabolic panel; Future; Expected date: 06/04/2024  -     Lipid Panel with Direct LDL reflex; Future; Expected date: 06/04/2024      Update AWV at next appt in March  Update mammo as previously ordered  Pt continues to decline colon cancer screening       Subjective      Here with friend for routine follow up. States she is not doing good. Stopped jardiance with having a lot of side effects - states it made her feel old. Per 9/8 call to cardiology it was reported this gave her headaches and she felt generally unwell. Cardiology rx'd and then discontinued this after she took for 3 months. States symptoms have not improved or gotten worse since stopping. She refuses to restart this and wants an alternative. Has been eating healthier since Easter. She walks regularly. States she takes other meds as rx'd. Review of Systems   Constitutional:  Positive for fatigue. Respiratory:  Positive for shortness of breath (with exertion). Cardiovascular: Negative. Genitourinary:         Stress leakage with coughing and sneezing - getting worse   Neurological: Negative. Psychiatric/Behavioral:  Positive for dysphoric mood (states she cries alot, usually by 4pm).         Current Outpatient Medications on File Prior to Visit   Medication Sig    aspirin (ECOTRIN LOW STRENGTH) 81 mg EC tablet Take 81 mg by mouth daily    carvedilol (COREG) 6.25 mg tablet Take 1 tablet (6.25 mg total) by mouth 2 (two) times a day with meals    escitalopram (LEXAPRO) 10 mg tablet TAKE 1 TABLET BY MOUTH TWICE A DAY    glipiZIDE (GLUCOTROL XL) 10 mg 24 hr tablet TAKE 1 TABLET BY MOUTH EVERY DAY    hydrochlorothiazide (HYDRODIURIL) 12.5 mg tablet TAKE 1 TABLET BY MOUTH EVERY DAY    losartan (COZAAR) 50 mg tablet TAKE 1 TABLET BY MOUTH EVERY DAY    MELATONIN PO Take 10 mg by mouth daily at bedtime    rosuvastatin (CRESTOR) 40 MG tablet Take 1 tablet (40 mg total) by mouth daily    zolpidem (AMBIEN) 10 mg tablet TAKE 1 TABLET BY MOUTH DAILY AT BEDTIME AS NEEDED FOR SLEEP. [DISCONTINUED] Jardiance 25 MG TABS TAKE 1 TABLET BY MOUTH EVERY DAY IN THE MORNING (Patient not taking: Reported on 12/7/2023)       Objective     /76   Pulse 68   Temp 98.5 °F (36.9 °C)   Ht 5' 1" (1.549 m)   Wt 79.2 kg (174 lb 9.6 oz)   BMI 32.99 kg/m²       Physical Exam  Vitals reviewed. Constitutional:       General: She is not in acute distress. Appearance: Normal appearance. She is obese. HENT:      Head: Normocephalic. Eyes:      General: No scleral icterus. Neck:      Thyroid: No thyromegaly. Vascular: No carotid bruit. Cardiovascular:      Rate and Rhythm: Normal rate and regular rhythm. Heart sounds: No murmur heard. Pulmonary:      Effort: Pulmonary effort is normal. No respiratory distress. Breath sounds: Normal breath sounds. Musculoskeletal:      Cervical back: Normal range of motion. Right lower leg: No edema. Left lower leg: No edema. Lymphadenopathy:      Cervical: No cervical adenopathy. Skin:     General: Skin is warm and dry. Neurological:      General: No focal deficit present. Mental Status: She is alert and oriented to person, place, and time. Cranial Nerves: No cranial nerve deficit. Psychiatric:         Attention and Perception: Attention normal.         Mood and Affect: Mood normal.         Behavior: Behavior normal.         Thought Content:  Thought content normal.         Cognition and Memory: Cognition normal.       PHQ-2/9 Depression Screening    Little interest or pleasure in doing things: 2 - more than half the days  Feeling down, depressed, or hopeless: 2 - more than half the days  Trouble falling or staying asleep, or sleeping too much: 1 - several days  Feeling tired or having little energy: 2 - more than half the days  Poor appetite or overeatin - not at all  Feeling bad about yourself - or that you are a failure or have let yourself or your family down: 2 - more than half the days  Trouble concentrating on things, such as reading the newspaper or watching television: 1 - several days  Moving or speaking so slowly that other people could have noticed.  Or the opposite - being so fidgety or restless that you have been moving around a lot more than usual: 1 - several days  Thoughts that you would be better off dead, or of hurting yourself in some way: 0 - not at all  PHQ-9 Score: 11   PHQ-9 Interpretation: Moderate depression            JEANCARLOS Olsen

## 2023-12-07 NOTE — ASSESSMENT & PLAN NOTE
PHQ score elevated at 11 on 20mg lexapro daily  Recommend and she is agreeable to adding additional agent - rx for bupropion issued  Return in 3 months for follow up - call sooner w/any mood concerns

## 2023-12-07 NOTE — ASSESSMENT & PLAN NOTE
Well controlled/stable on current meds  Continue same doses as rx'd  Return in 3 months for next recheck [Cardiac Failure] : cardiac failure [Coronary Artery Disease] : coronary artery disease

## 2023-12-07 NOTE — ASSESSMENT & PLAN NOTE
LDL nearly at goal on high intensity statin  Continue same dose as rx'd by cardiology & maximize lifestyle efforts  Recheck in 6 months

## 2023-12-07 NOTE — PATIENT INSTRUCTIONS

## 2023-12-07 NOTE — ASSESSMENT & PLAN NOTE
Lab Results   Component Value Date    HGBA1C 7.9 (A) 12/07/2023     A1C slightly improved to 7.9% but remains above goal  Given inadequate control & previous med intolerances, will refer to endocrine for further treatment  Commended her healthier lifestyle efforts & encouraged ongoing  Eye and foot exams UTD  Microalbumin UTD - continue ARB daily  Continue statin daily  Update labs in 6 months as ordered

## 2024-01-09 ENCOUNTER — CONSULT (OUTPATIENT)
Dept: ENDOCRINOLOGY | Facility: HOSPITAL | Age: 59
End: 2024-01-09
Payer: MEDICARE

## 2024-01-09 VITALS
SYSTOLIC BLOOD PRESSURE: 112 MMHG | HEIGHT: 61 IN | HEART RATE: 68 BPM | WEIGHT: 176 LBS | BODY MASS INDEX: 33.23 KG/M2 | DIASTOLIC BLOOD PRESSURE: 64 MMHG

## 2024-01-09 DIAGNOSIS — E11.65 TYPE 2 DIABETES MELLITUS WITH HYPERGLYCEMIA, WITHOUT LONG-TERM CURRENT USE OF INSULIN (HCC): Primary | ICD-10-CM

## 2024-01-09 DIAGNOSIS — R80.9 MICROALBUMINURIA DUE TO TYPE 2 DIABETES MELLITUS: ICD-10-CM

## 2024-01-09 DIAGNOSIS — I10 PRIMARY HYPERTENSION: ICD-10-CM

## 2024-01-09 DIAGNOSIS — E11.29 MICROALBUMINURIA DUE TO TYPE 2 DIABETES MELLITUS: ICD-10-CM

## 2024-01-09 DIAGNOSIS — E78.2 MIXED HYPERLIPIDEMIA: ICD-10-CM

## 2024-01-09 PROCEDURE — 99205 OFFICE O/P NEW HI 60 MIN: CPT | Performed by: INTERNAL MEDICINE

## 2024-01-09 NOTE — PATIENT INSTRUCTIONS
Hgba1c is 7.9%. this is still too high.     Continue the same glipizide.XL 10 mg daily.     We'll try starting farxiga 5 mg daily.drink more water. Take in am. Call if too expensive or not covered by insurance.     You had bupropion (Wellbutrin) ordered in December for depression to add to the lexapro(pram). Call the [facility] to have it filled as the prescription is there.     Let's get you to diabetes education for nutrition.     Find the blood sugar monitor and work on testing more up to once daily. Call in the sugars 2-3 weeks.    Follow up in 3 months with blood work.

## 2024-01-09 NOTE — PROGRESS NOTES
1/10/2024    Assessment/Plan     1. Type 2 diabetes mellitus with hyperglycemia, without long-term current use of insulin ([facility])  -     Ambulatory Referral to Endocrinology  -     dapagliflozin (Farxiga) 5 MG TABS; Take 1 tablet (5 mg total) by mouth daily  -     Ambulatory referral to Diabetic Education; Future    2. Primary hypertension    3. Mixed hyperlipidemia    4. Microalbuminuria due to type 2 diabetes mellitus        1. Type 2 diabetes.  Currently, it is still poorly controlled with a hemoglobin A1c of 7.9% but definitely improved. She does have the complication of microalbuminuria but has no neuropathy on exam as diabetic foot exam was performed today. She does not have any known retinopathy but is due for a diabetic eye exam in the near future. At this point, she is only on glipizide; given her cardiac history and her urine microalbumin, I would like her to try Farxiga 5 mg daily as Jardiance was apparently not on her formulary and she had some unusual side effects which I am not sure were related to the Jardiance. We had a long discussion about the importance of taking diabetes medicines and giving them a good try even with minor side effects given she has already had 3 heart attacks at the age of 58 and really needs to protect her heart and get her blood sugars under control. She will continue the same glipizide for now. I have asked her to start testing her blood sugars at least once a day and to send me a blood sugar record in 2 to 3 weeks. I have asked her to make an appointment diabetes education for medical nutrition therapy.    2. Diabetic microalbuminuria.  She is on losartan 50 mg daily. I will add Farxiga 5 mg daily for renal protection also.    3. Hypertension.  She is normotensive in the office on her current dose of losartan, hydrochlorothiazide, and carvedilol.    4. Hyperlipidemia.  She has a reasonable lipid profile on her current dose of rosuvastatin 40 mg daily.     I have asked her to  follow up in 3 months. I have not ordered blood work as blood work has been ordered by her primary care physician to be done in 03/2024 consisting of a hemoglobin A1c, CMP, CBC, lipid panel, TSH, and urine microalbumin to creatinine ratio.    I have spent a total time of 55 minutes on 1/9/2024 in caring for this patient including Diagnostic results, Prognosis, Risks and benefits of tx options, Instructions for management, Patient and family education, Importance of tx compliance, Risk factor reductions, Impressions, Counseling / Coordination of care, Documenting in the medical record, Reviewing / ordering tests, medicine, procedures  , and Obtaining or reviewing history  .    CC: Diabetes Consult    History of Present Illness     HPI: Marian Myers is a 58 y.o. year old female who presents for diabetes consult.     The patient reports being diagnosed with diabetes between 2011 and the present. She believes she was referred  by her primary care physician, Dr. Cline for further management of her diabetes. Her blood glucose levels were consistently low; hence, no immediate concern was given until her blood glucose level began to rise. Metformin was prescribed, but it proven ineffective to her due to adverse effects, specifically diarrhea. Presently, she is on GlipiZIDE XL 10 mg once a day. She affirms long-term use of Glipizide at a consistent dosage. She has not been treated with pioglitazone, Januvia, Onglyza, Tradjenta, Rybelsus, Farxiga, or Invokana. She refused injectable treatments such as insulin. She also reported that Jardiance was ineffective for her, despite promoting hair growth as she experienced adverse effects including tremors and diarrhea, and an overall sensation of aging. The medication was initially prescribed by her cardiologist, but the patient is uncertain about the specific reason for its prescription. She also received a notification from her insurance company stating that Jardiance is not  "covered under her plan.     She went to the hospital 2.5 years ago for renal calculus. She was \"put in a bag\" because the renal calculus was outside the kidney causing obstruction. She has not attended diabetes education before, as her primary physician did not deem it necessary. Since last year, she has reduced her consumption of ice cream and cake following the discovery that her hemoglobin A1c level, as reported by had risen to 11%. Her primary physician aims to lower this to 7%.     She has lost some weight since her last visit. There is no increase in her frequency of urination. She occasionally wakes up once during the night to urinate. She has polydipsia. She experiences intermittent polyphagia and prefers snacking.     She reports experiencing paresthesia in her feet upon standing in the morning. She denies the presence of any wounds on her feet and has not consulted with a podiatrist. The patient also experiences blurred vision, predominantly in the late afternoon and evening when fatigued.     She has not had a recent ophthalmological examination. She confirms that no diabetic retinopathy was noted on her last visual exam in 03/2022.     She denies any abdominal pain, nausea, or edema in the lower extremities. She reports abnormal bowel movements, which she attributes to stress. She anticipates that this will resolve once her stress levels decrease. She also notes increased emotional sensitivity compared to her previous state. She has occasional lightheadedness, dizziness, and headaches. She has mild weakness in her right arm, but no musculoskeletal pain was reported. She denies experiencing chest pain, however, she occasionally experiences pressure on chest during physical activity, which alleviates upon rest. It is unclear if her cardiologist, Dr. Henderson is aware of this symptom. The patient denies dyspnea or insomnia. She experiences intermittent fatigue. She has a CPAP machine, but she does not use it " "consistently at night. She denies dysphagia.      Hypoglycemic episodes: No, she has never had low blood glucose level.   Treatment of hypoglycemia: She eats a saltine and chocolate candy bar if she has low blood glucose.   Medic alert tag: She does not have a medical alert bracelet or necklace, but on her keys have indicated the word \"penicillin\".  She was recommended to utilize a medical alert bracelet that says diabetes.    Blood Sugar/Glucometer/Pump/CGM review: The patient does not monitor her blood glucose levels at home, citing consistent readings. However, she had and used to check her blood glucose occasionally following her initial diagnosis.      The patient's last eye exam was in 03/2022.       She is on carvedilol 6.25 mg twice a day, losartan 50 mg a day, and hydrochlorothiazide 12.5 mg a day for the management of her hypertension.     She is on rosuvastatin 40 mg a day for hyperlipidemia.      She denies any family history of diabetes.         Last A1C was   Lab Results   Component Value Date    HGBA1C 7.9 (A) 12/07/2023   .        Review of Systems  The pertinent positive and negative findings are as noted in the HPI.    Historical Information   Past Medical History:   Diagnosis Date    Circulation problem     Coronary artery disease     Depression     Diabetes (HCC)     Dyspnea on exertion 11/12/2019    Glaucoma     Hearing problem     Heart attack ([facility])     X 3    Heart failure ([facility])     Heart valve problem     High blood pressure     High cholesterol     Hypoxia 07/29/2022    Kidney stone 2022    left    Moderate episode of recurrent major depressive disorder (HCC) 03/23/2023    Obesity, morbid ([facility]) 01/25/2021    Open injury of heart     Pyelitis 07/27/2022    Sepsis ([facility]) 07/27/2022    Sleep apnea      Past Surgical History:   Procedure Laterality Date    CORONARY ARTERY BYPASS GRAFT  2011    CORONARY STENT PLACEMENT  2010 January    CORONARY STENT PLACEMENT  2015 June    IR NEPHROSTOMY " TUBE CHECK/CHANGE/REPOSITION/REINSERTION/UPSIZE  2022    IR NEPHROSTOMY TUBE PLACEMENT  2022    TONSILECTOMY AND ADNOIDECTOMY      VAGINAL DELIVERY  1987    VAGINAL DELIVERY       Social History   Social History     Substance and Sexual Activity   Alcohol Use Yes    Comment: social     Social History     Substance and Sexual Activity   Drug Use Yes    Types: Marijuana    Comment: daily prn     Social History     Tobacco Use   Smoking Status Former    Current packs/day: 0.00    Types: Cigarettes    Quit date:     Years since quittin.0   Smokeless Tobacco Never     Family History:   Family History   Problem Relation Age of Onset    Hypertension Father     Coronary artery disease Father     Sudden death Father         cardiac 2014    No Known Problems Son     No Known Problems Daughter        Meds/Allergies   Current Outpatient Medications   Medication Sig Dispense Refill    aspirin (ECOTRIN LOW STRENGTH) 81 mg EC tablet Take 81 mg by mouth daily      carvedilol (COREG) 6.25 mg tablet Take 1 tablet (6.25 mg total) by mouth 2 (two) times a day with meals 180 tablet 3    dapagliflozin (Farxiga) 5 MG TABS Take 1 tablet (5 mg total) by mouth daily 30 tablet 6    escitalopram (LEXAPRO) 10 mg tablet TAKE 1 TABLET BY MOUTH TWICE A  tablet 1    glipiZIDE (GLUCOTROL XL) 10 mg 24 hr tablet TAKE 1 TABLET BY MOUTH EVERY DAY 90 tablet 0    hydrochlorothiazide (HYDRODIURIL) 12.5 mg tablet TAKE 1 TABLET BY MOUTH EVERY DAY 90 tablet 1    losartan (COZAAR) 50 mg tablet TAKE 1 TABLET BY MOUTH EVERY DAY 90 tablet 0    rosuvastatin (CRESTOR) 40 MG tablet Take 1 tablet (40 mg total) by mouth daily 90 tablet 3    zolpidem (AMBIEN) 10 mg tablet TAKE 1 TABLET BY MOUTH DAILY AT BEDTIME AS NEEDED FOR SLEEP. 90 tablet 0    buPROPion (WELLBUTRIN XL) 150 mg 24 hr tablet Take 1 tablet (150 mg total) by mouth every morning (Patient not taking: Reported on 2024) 90 tablet 1    MELATONIN PO Take 10 mg by mouth daily  "at bedtime (Patient not taking: Reported on 1/9/2024)       No current facility-administered medications for this visit.     Allergies   Allergen Reactions    Metformin Diarrhea    Penicillins Hives       Objective   Vitals: Blood pressure 112/64, pulse 68, height 5' 1\" (1.549 m), weight 79.8 kg (176 lb).  Invasive Devices       None                   Physical Exam  Cardiovascular:      Pulses: no weak pulses          Dorsalis pedis pulses are 2+ on the right side and 2+ on the left side.        Posterior tibial pulses are 2+ on the right side and 2+ on the left side.   Feet:      Right foot:      Skin integrity: Callus present. No ulcer, skin breakdown, erythema, warmth or dry skin.      Left foot:      Skin integrity: Callus present. No ulcer, skin breakdown, erythema, warmth or dry skin.       Physical exam normal with pertinent positives and negatives.    HEENT: No lid lags, stare, proptosis, or periorbital edema. Thyroid normal in size. No palpable thyroid nodules.  Respiratory: Lungs clear.   Cardiovascular: Heart is regular. No murmurs.   Neurological: No tremor of the outstretched hands. Patellar deep tendon reflexes normal.   Diabetic foot exam: No swelling in the feet. Dorsalis pedis and posterior tibialis pulses 2+. No ulcerations in the feet. Small callus on the medial portion of the first toes bilaterally. Vibration sensation intact to the first toe DIP joint bilaterally. Microfilament sensation intact to both feet.  Patient's shoes and socks removed.    Right Foot/Ankle   Right Foot Inspection  Skin Exam: skin normal, skin intact, callus and callus. No dry skin, no warmth, no erythema, no maceration, no abnormal color, no pre-ulcer and no ulcer.     Toe Exam: No swelling and  no right toe deformity    Sensory   Vibration: intact  Monofilament testing: intact    Vascular  Capillary refills: < 3 seconds  The right DP pulse is 2+. The right PT pulse is 2+.     Left Foot/Ankle  Left Foot Inspection  Skin " Exam: skin normal, skin intact and callus. No dry skin, no warmth, no erythema, no maceration, normal color, no pre-ulcer and no ulcer.     Toe Exam: No swelling and no left toe deformity.     Sensory   Vibration: intact  Monofilament testing: intact    Vascular  Capillary refills: < 3 seconds  The left DP pulse is 2+. The left PT pulse is 2+.     Assign Risk Category  No deformity present  No loss of protective sensation  No weak pulses  Risk: 0        The history was obtained from the review of the chart and from the patient.    Lab Results:    Most recent Alc is  Lab Results   Component Value Date    HGBA1C 7.9 (A) 12/07/2023               Lab Results   Component Value Date    CREATININE 0.91 12/05/2023    CREATININE 0.91 09/12/2023    CREATININE 0.93 06/19/2023    BUN 14 12/05/2023    K 3.9 12/05/2023     12/05/2023    CO2 28 12/05/2023     eGFR   Date Value Ref Range Status   12/05/2023 69 ml/min/1.73sq m Final         Lab Results   Component Value Date    HDL 34 (L) 12/05/2023    TRIG 152 (H) 12/05/2023       Lab Results   Component Value Date    ALT 19 12/05/2023    AST 23 12/05/2023    ALKPHOS 84 12/05/2023             Blood work performed on 12/07/2023 showed a hemoglobin A1c of 7.9%. Of note, her hemoglobin A1c was 11.1% on 03/20/2024. CMP on 12/05/2023 showed a glucose of 212, fasting but was otherwise normal. Total cholesterol was 149 with triglycerides 152, HDL 34, and LDL 85.     Of note, urine microalbumin to creatinine ratio on 03/20/2023 was elevated at 50.    Future Appointments   Date Time Provider Department Center   1/24/2024  8:30 AM Tavia Nelson RD DIAB ED [facility]   3/25/2024  9:20 AM JEANCARLOS [facility]  [address] [facility]   5/31/2024  9:20 AM Susan Freeman MD ENDO [facility]       Transcribed for Susan Freeman MD, by Maureen Lozano 01/10/24 at 5:01 PM. Powered by Dragon [employer].

## 2024-01-24 ENCOUNTER — OFFICE VISIT (OUTPATIENT)
Dept: DIABETES SERVICES | Facility: HOSPITAL | Age: 59
End: 2024-01-24
Payer: MEDICARE

## 2024-01-24 VITALS — BODY MASS INDEX: 33.04 KG/M2 | WEIGHT: 175 LBS | HEIGHT: 61 IN

## 2024-01-24 DIAGNOSIS — E11.65 TYPE 2 DIABETES MELLITUS WITH HYPERGLYCEMIA, WITHOUT LONG-TERM CURRENT USE OF INSULIN (HCC): Primary | ICD-10-CM

## 2024-01-24 PROCEDURE — 97802 MEDICAL NUTRITION INDIV IN: CPT | Performed by: DIETITIAN, REGISTERED

## 2024-01-24 NOTE — PROGRESS NOTES
"Medical Nutrition Therapy     Assessment    Visit Type: Initial visit  Chief complaint:  Type 2 Diabetes     HPI:  Met with Marian and her friend for initial MNT. States DM probably 10 years. Not testing at home.  Discussed the benefit of testing, encouraged pair testing, before a meal and 2hr after a meal, rotating between meals, to assess how food affects the blood sugars.  She is willing to start doing this.  She has cycled through most diabetes medication and states an adverse reaction with most of them.  She does express resistance to starting on insulin.  We did discuss insulin and dispelled myths of insulin, she is a little more open to this idea now if needed in the future.  Food recall shows primary issues: Typically eats higher carbohydrate diet and larger portions.  Discussed the need to make a reduction in carbohydrate intake at most meals to work on improving blood sugars.  Together we discussed what foods contain CHO, reading a food label, serving sizes, and set a carb goal of 30-45g CHO/meal to promote weight loss with 15g snacks. Put together sample meals for Marian's reference and evaluated Marian's current eating plan. Good understanding, Marian will call with questions or for more education. Follow up as needed if not improving.      Ht Readings from Last 1 Encounters:   01/24/24 5' 1\" (1.549 m)     Wt Readings from Last 3 Encounters:   01/24/24 79.4 kg (175 lb)   01/09/24 79.8 kg (176 lb)   12/07/23 79.2 kg (174 lb 9.6 oz)        Body mass index is 33.07 kg/m².     Lab Results   Component Value Date    HGBA1C 7.9 (A) 12/07/2023    HGBA1C 8.0 (H) 09/12/2023    HGBA1C 9.4 (H) 06/19/2023       No results found for: \"CHOL\"  Lab Results   Component Value Date    HDL 34 (L) 12/05/2023    HDL 43 (L) 09/12/2023    HDL 42 (L) 03/20/2023     Lab Results   Component Value Date    LDLCALC 85 12/05/2023    LDLCALC 79 09/12/2023    LDLCALC 75 03/20/2023     Lab Results   Component Value Date    TRIG 152 (H) 12/05/2023 " "   TRIG 191 (H) 09/12/2023    TRIG 184 (H) 03/20/2023     No results found for: \"CHOLHDL\"    Weight Change: No    Medical Diagnosis/ICD 10 Code:  E11.65    Barriers to Learning: language, vision, and hearing    Do you follow any special diet presently?: No  Who shops: patient and friend  Who cooks: patient and friend    Food Log: Completed via the method of food recall- lives alone     Breakfast: up around 7-8am. Coffee 8am- with vanilla creamer some sugar. If eating: muffin big alecia, cinnamon toast 2, eggs taylor meat no toast potatoes. Hot oatmeal 2 maple brown sugar.   Morning Snack:grapes  Lunch: PBJ sandwich, individual mac and cheese, peirogies. 3pm:   Afternoon Snack:   Dinner:if alone might go without eating. If has her friend or dog will cook- mannicotti; lots of pasta, once a week full meal. Sometimes just the meat. Veggies sautee or chicken or pork and saurkraut.   Evening Snack:mac and cheese last night at 10:30pm. Ice cream used to be but not now, PBJ sandwich,   Beverages: water or unsweet tea, coffee in the morning, rare soda, chocolate milk rare, OJ when out or sometimes at home,. V8 not as much recently,   Eating out/Take out: used to do it a lot, now about once a week.   Exercise ADL, goes to gun shows and walks around. Goes and walks [employer] or dog for a walk.     Nutrition Diagnosis:  Food and nutrition related knowledge deficit  related to Lack of prior exposure to accurate nutrition related information as evidenced by Verbalizes inaccurate or incomplete information    Intervention: plate method, label reading, behavior modification strategies, carbohydrate counting, meal planning, individualized meal plan, and monitoring portion control     Treatment Goals: Patient understands education and recommendations    Education Material Given  Marian was provided the Portion Booklet and Planning Healthy Meals     Monitoring and evaluation:    Term code indicator  FH 1.6.3 Carbohydrate Intake Criteria: " 30-45g CHO per meal, 15g CHO snacks    Patient’s Response to Instruction:  Comprehensiongood  Motivationgood  Expected Compliancegood    Thank you for coming to the [facility] for education today.  Please feel free to call with any questions or concerns.    Tavia Nelson, RD  [address]  [facility]  [place] [phone]

## 2024-01-25 DIAGNOSIS — E11.65 TYPE 2 DIABETES MELLITUS WITH HYPERGLYCEMIA, WITHOUT LONG-TERM CURRENT USE OF INSULIN (HCC): Primary | ICD-10-CM

## 2024-01-25 RX ORDER — LANCETS 23 GAUGE
1 EACH MISCELLANEOUS 3 TIMES DAILY
COMMUNITY
End: 2024-01-25 | Stop reason: SDUPTHER

## 2024-01-25 RX ORDER — BLOOD SUGAR DIAGNOSTIC
1 STRIP MISCELLANEOUS 3 TIMES DAILY
Qty: 300 STRIP | Refills: 2 | Status: SHIPPED | OUTPATIENT
Start: 2024-01-25

## 2024-01-25 RX ORDER — BLOOD SUGAR DIAGNOSTIC
1 STRIP MISCELLANEOUS 3 TIMES DAILY
COMMUNITY
End: 2024-01-25 | Stop reason: SDUPTHER

## 2024-01-25 RX ORDER — LANCETS 23 GAUGE
1 EACH MISCELLANEOUS 3 TIMES DAILY
Qty: 3 EACH | Refills: 2 | Status: SHIPPED | OUTPATIENT
Start: 2024-01-25

## 2024-01-25 NOTE — TELEPHONE ENCOUNTER
Patient asking for lancets and strips to be sent to [facility], [place] - current meter is One Touch Ultra Mini.    Patient currently testing TID and would like 90 day supply

## 2024-01-26 ENCOUNTER — TELEPHONE (OUTPATIENT)
Dept: ENDOCRINOLOGY | Facility: HOSPITAL | Age: 59
End: 2024-01-26

## 2024-01-26 ENCOUNTER — TELEPHONE (OUTPATIENT)
Dept: FAMILY MEDICINE CLINIC | Facility: HOSPITAL | Age: 59
End: 2024-01-26

## 2024-01-26 NOTE — TELEPHONE ENCOUNTER
Patient just called and said that she started her finger sticks and she is showing that her blood sugars ar ein the 400's. She was wondering if she should start taking double glipiZIDE. Thanks

## 2024-01-26 NOTE — TELEPHONE ENCOUNTER
Numbers have been in the 400 range.  Wants to know if she should double her   glipiZIDE (GLUCOTROL XL) 10 mg 24 hr tablet     Please call.

## 2024-02-01 DIAGNOSIS — G47.00 INSOMNIA, UNSPECIFIED TYPE: ICD-10-CM

## 2024-02-01 DIAGNOSIS — E11.65 TYPE 2 DIABETES MELLITUS WITH HYPERGLYCEMIA, WITHOUT LONG-TERM CURRENT USE OF INSULIN (HCC): ICD-10-CM

## 2024-02-01 RX ORDER — ZOLPIDEM TARTRATE 10 MG/1
TABLET ORAL
Qty: 90 TABLET | Refills: 0 | Status: SHIPPED | OUTPATIENT
Start: 2024-02-01

## 2024-02-01 RX ORDER — GLIPIZIDE 10 MG/1
10 TABLET, FILM COATED, EXTENDED RELEASE ORAL DAILY
Qty: 90 TABLET | Refills: 0 | Status: SHIPPED | OUTPATIENT
Start: 2024-02-01

## 2024-02-01 NOTE — TELEPHONE ENCOUNTER
Patient requested a refill on her glipiZIDE as DS has changed her to 10mg twice a day. Thank you.

## 2024-02-05 DIAGNOSIS — E11.65 TYPE 2 DIABETES MELLITUS WITH HYPERGLYCEMIA, WITHOUT LONG-TERM CURRENT USE OF INSULIN (HCC): ICD-10-CM

## 2024-02-05 RX ORDER — BLOOD SUGAR DIAGNOSTIC
STRIP MISCELLANEOUS
Qty: 90 STRIP | Refills: 2 | Status: SHIPPED | OUTPATIENT
Start: 2024-02-05

## 2024-02-16 DIAGNOSIS — E11.65 TYPE 2 DIABETES MELLITUS WITH HYPERGLYCEMIA, WITHOUT LONG-TERM CURRENT USE OF INSULIN (HCC): ICD-10-CM

## 2024-02-16 RX ORDER — GLIPIZIDE 10 MG/1
10 TABLET, FILM COATED, EXTENDED RELEASE ORAL DAILY
Qty: 90 TABLET | Refills: 0 | Status: SHIPPED | OUTPATIENT
Start: 2024-02-16 | End: 2024-02-20 | Stop reason: SDUPTHER

## 2024-02-20 ENCOUNTER — DOCUMENTATION (OUTPATIENT)
Dept: ENDOCRINOLOGY | Facility: HOSPITAL | Age: 59
End: 2024-02-20

## 2024-02-20 DIAGNOSIS — E11.65 TYPE 2 DIABETES MELLITUS WITH HYPERGLYCEMIA, WITHOUT LONG-TERM CURRENT USE OF INSULIN (HCC): ICD-10-CM

## 2024-02-20 RX ORDER — GLIPIZIDE 10 MG/1
10 TABLET, FILM COATED, EXTENDED RELEASE ORAL 2 TIMES DAILY
Qty: 180 TABLET | Refills: 0 | Status: SHIPPED | OUTPATIENT
Start: 2024-02-20

## 2024-02-20 NOTE — PROGRESS NOTES
This patient left a message regarding her Glipizide prescription. It was sent to the pharmacy with old directions. She called our staff idiots multiple times and used other explicit's. I called the patient back and advised her that it was a mistake and that I updated her prescription and sent to over to her pharmacy. She proceeded to call me an idiot and kept using explicit language. I told her that I did not appreciate the way that she was speaking to me and she then started to raise her voice. I told her that I would have the practice admin call her and hung up the phone.

## 2024-03-01 DIAGNOSIS — I10 ESSENTIAL HYPERTENSION: ICD-10-CM

## 2024-03-01 RX ORDER — LOSARTAN POTASSIUM 50 MG/1
TABLET ORAL
Qty: 90 TABLET | Refills: 1 | Status: SHIPPED | OUTPATIENT
Start: 2024-03-01

## 2024-03-25 ENCOUNTER — OFFICE VISIT (OUTPATIENT)
Dept: FAMILY MEDICINE CLINIC | Facility: HOSPITAL | Age: 59
End: 2024-03-25
Payer: MEDICARE

## 2024-03-25 VITALS
TEMPERATURE: 98.1 F | WEIGHT: 174.8 LBS | HEART RATE: 67 BPM | BODY MASS INDEX: 33 KG/M2 | DIASTOLIC BLOOD PRESSURE: 66 MMHG | HEIGHT: 61 IN | SYSTOLIC BLOOD PRESSURE: 102 MMHG

## 2024-03-25 DIAGNOSIS — I10 PRIMARY HYPERTENSION: Primary | ICD-10-CM

## 2024-03-25 DIAGNOSIS — E11.65 TYPE 2 DIABETES MELLITUS WITH HYPERGLYCEMIA, WITHOUT LONG-TERM CURRENT USE OF INSULIN (HCC): ICD-10-CM

## 2024-03-25 DIAGNOSIS — I25.810 CORONARY ARTERY DISEASE INVOLVING CORONARY BYPASS GRAFT OF NATIVE HEART WITHOUT ANGINA PECTORIS: ICD-10-CM

## 2024-03-25 DIAGNOSIS — Z00.00 MEDICARE ANNUAL WELLNESS VISIT, SUBSEQUENT: ICD-10-CM

## 2024-03-25 DIAGNOSIS — J44.9 CHRONIC OBSTRUCTIVE PULMONARY DISEASE, UNSPECIFIED COPD TYPE (HCC): ICD-10-CM

## 2024-03-25 DIAGNOSIS — Z12.31 ENCOUNTER FOR SCREENING MAMMOGRAM FOR MALIGNANT NEOPLASM OF BREAST: ICD-10-CM

## 2024-03-25 DIAGNOSIS — E78.2 MIXED HYPERLIPIDEMIA: ICD-10-CM

## 2024-03-25 DIAGNOSIS — F33.9 DEPRESSION, RECURRENT (HCC): ICD-10-CM

## 2024-03-25 LAB
LEFT EYE DIABETIC RETINOPATHY: ABNORMAL
LEFT EYE IMAGE QUALITY: ABNORMAL
LEFT EYE MACULAR EDEMA: ABNORMAL
LEFT EYE OTHER RETINOPATHY: ABNORMAL
RIGHT EYE DIABETIC RETINOPATHY: ABNORMAL
RIGHT EYE IMAGE QUALITY: ABNORMAL
RIGHT EYE MACULAR EDEMA: ABNORMAL
RIGHT EYE OTHER RETINOPATHY: ABNORMAL
SEVERITY (EYE EXAM): ABNORMAL

## 2024-03-25 PROCEDURE — G0439 PPPS, SUBSEQ VISIT: HCPCS | Performed by: NURSE PRACTITIONER

## 2024-03-25 PROCEDURE — 99214 OFFICE O/P EST MOD 30 MIN: CPT | Performed by: NURSE PRACTITIONER

## 2024-03-25 NOTE — ASSESSMENT & PLAN NOTE
Lab Results   Component Value Date    HGBA1C 7.9 (A) 12/07/2023     Now managed by endocrine - f/u scheduled 5/31, advise she update next labs few days prior to endocrine appt  Continue same meds as rx'd - on ARB and statin daily  Foot exam UTD this year  Update IRIS eye exam in office today (right eye no retinopathy, left eye not gradable) - will need to establish w/ophthalmology (discuss at next appt)  Update microalbumin w/next labs due

## 2024-03-25 NOTE — PROGRESS NOTES
Assessment and Plan:     Problem List Items Addressed This Visit       Coronary artery disease involving coronary bypass graft of native heart without angina pectoris     Due for annual cardiology follow up - advise she call to schedule         Hypertension - Primary     Stable BP control  Continue same meds as rx'd  Return in 6 months         Hyperlipidemia     Compliant w/statin as rx'd  Update FLP as ordered before next endo appt in May         Type 2 diabetes mellitus, without long-term current use of insulin (HCC)       Lab Results   Component Value Date    HGBA1C 7.9 (A) 12/07/2023     Now managed by endocrine - f/u scheduled 5/31, advise she update next labs few days prior to endocrine appt  Continue same meds as rx'd - on ARB and statin daily  Foot exam UTD this year  Update IRIS eye exam in office today (right eye no retinopathy, left eye not gradable) - will need to establish w/ophthalmology (discuss at next appt)  Update microalbumin w/next labs due           Relevant Orders    IRIS Diabetic eye exam (Completed)    Depression, recurrent (HCC)     Mild depression w/PHQ score of 6 currently   Will continue same lexapro and bupropion doses as rx'd  Return in 6 months         Chronic obstructive pulmonary disease, unspecified COPD type (HCC)     Clinically stable w/o recent exacerbation          Other Visit Diagnoses       Encounter for screening mammogram for malignant neoplasm of breast        Relevant Orders    Mammo screening bilateral w 3d & cad    Medicare annual wellness visit, subsequent        AWV updated, next due in 1 year            Depression Screening and Follow-up Plan: Patient's depression screening was positive with a PHQ-9 score of 6. Patient assessed for underlying major depression. Brief counseling provided and recommend additional follow-up/re-evaluation next office visit. Patient advised to follow-up with PCP for further management.       Preventive health issues were discussed with  patient, and age appropriate screening tests were ordered as noted in patient's After Visit Summary.  Personalized health advice and appropriate referrals for health education or preventive services given if needed, as noted in patient's After Visit Summary.     History of Present Illness:     Patient presents for a Medicare Wellness Visit      States she was trying to get higher dose of glipizide refilled in February from endocrine and was also frustrated she went to the lab for blood work but isn't due yet. States she is frustrated because things are getting more confusing for her.  Denies concerns otherwise. Takes all meds as rx'd.         Patient Care Team:  JEANCARLOS Anne as PCP - General (Family Medicine)  Tavia Nelson RD as Diabetes Educator (Dietician)     Review of Systems:     Review of Systems   Constitutional: Negative.    Respiratory: Negative.     Cardiovascular: Negative.    Psychiatric/Behavioral:  Positive for dysphoric mood. The patient is nervous/anxious.         Problem List:     Patient Active Problem List   Diagnosis    Coronary artery disease involving native coronary artery of native heart without angina pectoris    S/P CABG x 2    Coronary artery disease involving coronary bypass graft of native heart without angina pectoris    S/P insertion of non-drug eluting coronary artery stent    Hypertension    Hyperlipidemia    Sleep apnea    Type 2 diabetes mellitus, without long-term current use of insulin (HCC)    BMI 35.0-35.9,adult    Insomnia    Depression, recurrent (HCC)    Chronic obstructive pulmonary disease, unspecified COPD type (HCC)    Microalbuminuria due to type 2 diabetes mellitus       Past Medical and Surgical History:     Past Medical History:   Diagnosis Date    Circulation problem     Coronary artery disease     Coronary artery disease involving coronary bypass graft of native heart without angina pectoris 11/12/2019    Depression     Diabetes (HCC)     Dyspnea on exertion  2019    Glaucoma     Hearing problem     Heart attack ([facility])     X 3    Heart failure ([facility])     Heart valve problem     High blood pressure     High cholesterol     Hypoxia 2022    Kidney stone     left    Moderate episode of recurrent major depressive disorder ([facility]) 2023    Obesity, morbid ([facility]) 2021    Open injury of heart     Pyelitis 2022    Sepsis ([facility]) 2022    Sleep apnea      Past Surgical History:   Procedure Laterality Date    CORONARY ARTERY BYPASS GRAFT      CORONARY STENT PLACEMENT  2010    CORONARY STENT PLACEMENT  2015    IR NEPHROSTOMY TUBE CHECK/CHANGE/REPOSITION/REINSERTION/UPSIZE  2022    IR NEPHROSTOMY TUBE PLACEMENT  2022    TONSILECTOMY AND ADNOIDECTOMY      VAGINAL DELIVERY      VAGINAL DELIVERY        Family History:     Family History   Problem Relation Age of Onset    Hypertension Father     Coronary artery disease Father     Sudden death Father         cardiac 2014    No Known Problems Son     No Known Problems Daughter       Social History:     Social History     Socioeconomic History    Marital status:      Spouse name: None    Number of children: None    Years of education: None    Highest education level: None   Occupational History    None   Tobacco Use    Smoking status: Former     Current packs/day: 0.00     Types: Cigarettes     Quit date:      Years since quittin.2    Smokeless tobacco: Never   Vaping Use    Vaping status: Never Used   Substance and Sexual Activity    Alcohol use: Yes     Comment: special events    Drug use: Yes     Types: Marijuana     Comment: daily prn    Sexual activity: None   Other Topics Concern    None   Social History Narrative    None     Social Determinants of Health     Financial Resource Strain: Low Risk  (3/23/2023)    Overall Financial Resource Strain (CARDIA)     Difficulty of Paying Living Expenses: Not hard at all   Food Insecurity: No Food  Insecurity (3/25/2024)    Hunger Vital Sign     Worried About Running Out of Food in the Last Year: Never true     Ran Out of Food in the Last Year: Never true   Transportation Needs: No Transportation Needs (3/25/2024)    PRAPARE - Transportation     Lack of Transportation (Medical): No     Lack of Transportation (Non-Medical): No   Physical Activity: Not on file   Stress: Not on file   Social Connections: Not on file   Intimate Partner Violence: Not on file   Housing Stability: Unknown (3/25/2024)    Housing Stability Vital Sign     Unable to Pay for Housing in the Last Year: No     Number of Places Lived in the Last Year: Not on file     Unstable Housing in the Last Year: No      Medications and Allergies:     Current Outpatient Medications   Medication Sig Dispense Refill    aspirin (ECOTRIN LOW STRENGTH) 81 mg EC tablet Take 81 mg by mouth daily      buPROPion (WELLBUTRIN XL) 150 mg 24 hr tablet Take 1 tablet (150 mg total) by mouth every morning 90 tablet 1    carvedilol (COREG) 6.25 mg tablet Take 1 tablet (6.25 mg total) by mouth 2 (two) times a day with meals 180 tablet 3    dapagliflozin (Farxiga) 5 MG TABS Take 1 tablet (5 mg total) by mouth daily 30 tablet 6    escitalopram (LEXAPRO) 10 mg tablet TAKE 1 TABLET BY MOUTH TWICE A  tablet 1    glipiZIDE (GLUCOTROL XL) 10 mg 24 hr tablet Take 1 tablet (10 mg total) by mouth 2 (two) times a day 180 tablet 0    glucose blood (OneTouch Ultra) test strip Use 1 daily 90 strip 2    hydrochlorothiazide (HYDRODIURIL) 12.5 mg tablet TAKE 1 TABLET BY MOUTH EVERY DAY 90 tablet 1    Lancets 28G MISC Use 1 Box 3 (three) times a day One Touch Ultra Mini lancets 3 each 2    losartan (COZAAR) 50 mg tablet TAKE 1 TABLET BY MOUTH EVERY DAY 90 tablet 1    rosuvastatin (CRESTOR) 40 MG tablet Take 1 tablet (40 mg total) by mouth daily 90 tablet 3    zolpidem (AMBIEN) 10 mg tablet TAKE 1 TABLET BY MOUTH DAILY AT BEDTIME AS NEEDED FOR SLEEP. 90 tablet 0    MELATONIN PO Take  10 mg by mouth daily at bedtime (Patient not taking: Reported on 1/9/2024)       No current facility-administered medications for this visit.     Allergies   Allergen Reactions    Metformin Diarrhea    Penicillins Hives      Immunizations:       There is no immunization history on file for this patient.   Health Maintenance:         Topic Date Due    Breast Cancer Screening: Mammogram  Never done    Colorectal Cancer Screening  Never done    Cervical Cancer Screening  12/13/2027    HIV Screening  Completed    Hepatitis C Screening  Completed         Topic Date Due    Pneumococcal Vaccine: Pediatrics (0 to 5 Years) and At-Risk Patients (6 to 64 Years) (1 of 2 - PCV) Never done    Influenza Vaccine (1) Never done    COVID-19 Vaccine (1 - 2023-24 season) Never done      Medicare Screening Tests and Risk Assessments:     Marian is here for her Subsequent Wellness visit. Last Medicare Wellness visit information reviewed, patient interviewed and updates made to the record today.      Health Risk Assessment:   Patient rates overall health as good. Patient feels that their physical health rating is slightly better. Patient is satisfied with their life. Eyesight was rated as same. Hearing was rated as same. Patient feels that their emotional and mental health rating is same. Patients states they are sometimes angry. Patient states they are sometimes unusually tired/fatigued. Pain experienced in the last 7 days has been some. Patient states that she has experienced no weight loss or gain in last 6 months.     Depression Screening:   PHQ-9 Score: 6      Fall Risk Screening:   In the past year, patient has experienced: no history of falling in past year      Urinary Incontinence Screening:   Patient has not leaked urine accidently in the last six months.     Home Safety:  Patient does not have trouble with stairs inside or outside of their home. Patient has working smoke alarms and has working carbon monoxide detector. Home  safety hazards include: none.     Nutrition:   Current diet is Regular.     Medications:   Patient is currently taking over-the-counter supplements. OTC medications include: see medication list. Patient is able to manage medications.     Activities of Daily Living (ADLs)/Instrumental Activities of Daily Living (IADLs):   Walk and transfer into and out of bed and chair?: Yes  Dress and groom yourself?: Yes    Bathe or shower yourself?: Yes    Feed yourself? Yes  Do your laundry/housekeeping?: Yes  Manage your money, pay your bills and track your expenses?: Yes  Make your own meals?: Yes    Do your own shopping?: Yes    Previous Hospitalizations:   Any hospitalizations or ED visits within the last 12 months?: No      Advance Care Planning:   Living will: No    Durable POA for healthcare: No    Advanced directive: No    Five wishes given: No    Patient declined ACP directive: Yes      PREVENTIVE SCREENINGS      Cardiovascular Screening:    General: Screening Not Indicated and History Lipid Disorder      Diabetes Screening:     General: Screening Not Indicated and History Diabetes      Colorectal Cancer Screening:     General: Patient Declines      Breast Cancer Screening:     General: Risks and Benefits Discussed    Due for: Mammogram        Cervical Cancer Screening:    General: Screening Current      Osteoporosis Screening:    General: Screening Not Indicated      Abdominal Aortic Aneurysm (AAA) Screening:        General: Screening Not Indicated      Lung Cancer Screening:     General: Screening Not Indicated      Hepatitis C Screening:    General: Screening Current    Screening, Brief Intervention, and Referral to Treatment (SBIRT)    Screening      Single Item Drug Screening:  How often have you used an illegal drug (including marijuana) or a prescription medication for non-medical reasons in the past year? never    Single Item Drug Screen Score: 0  Interpretation: Negative screen for possible drug use  "disorder    Vision Screening    Right eye Left eye Both eyes   Without correction      With correction 20/20 20/20 20/20        Physical Exam:     /66   Pulse 67   Temp 98.1 °F (36.7 °C)   Ht 5' 1\" (1.549 m)   Wt 79.3 kg (174 lb 12.8 oz)   BMI 33.03 kg/m²       Physical Exam  Vitals reviewed.   Constitutional:       General: She is not in acute distress.     Appearance: Normal appearance.   HENT:      Head: Normocephalic.   Eyes:      General: No scleral icterus.  Neck:      Vascular: No carotid bruit.   Cardiovascular:      Rate and Rhythm: Normal rate and regular rhythm.      Heart sounds: Murmur (1/6 JEFF) heard.   Pulmonary:      Effort: Pulmonary effort is normal. No respiratory distress.      Breath sounds: Rales (faint bibasilar) present.   Musculoskeletal:      Cervical back: Normal range of motion.      Right lower leg: No edema.      Left lower leg: No edema.   Skin:     General: Skin is warm and dry.   Neurological:      General: No focal deficit present.      Mental Status: She is alert and oriented to person, place, and time.   Psychiatric:         Mood and Affect: Mood normal.         Speech: Speech normal.         Behavior: Behavior normal.         Thought Content: Thought content normal.         Cognition and Memory: Cognition normal.         Judgment: Judgment normal.          ELIANA-7 Flowsheet Screening      Flowsheet Row Most Recent Value   Over the last 2 weeks, how often have you been bothered by any of the following problems?    Feeling nervous, anxious, or on edge 2   Not being able to stop or control worrying 1   Worrying too much about different things 1   Trouble relaxing 0   Being so restless that it is hard to sit still 0   Becoming easily annoyed or irritable 1   Feeling afraid as if something awful might happen 0   ELIANA-7 Total Score 5            JEANCARLOS Anne    Depression Screening Follow-up Plan: Patient's depression screening was positive with a PHQ-2 score of . Their " PHQ-9 score was 6. Patient assessed for underlying major depression. They have no active suicidal ideations. Brief counseling provided and recommend additional follow-up/re-evaluation next office visit. Patient advised to follow-up with PCP for further management.

## 2024-03-25 NOTE — PATIENT INSTRUCTIONS
Medicare Preventive Visit Patient Instructions  Thank you for completing your Welcome to Medicare Visit or Medicare Annual Wellness Visit today. Your next wellness visit will be due in one year (3/26/2025).  The screening/preventive services that you may require over the next 5-10 years are detailed below. Some tests may not apply to you based off risk factors and/or age. Screening tests ordered at today's visit but not completed yet may show as past due. Also, please note that scanned in results may not display below.  Preventive Screenings:  Service Recommendations Previous Testing/Comments   Colorectal Cancer Screening  * Colonoscopy    * Fecal Occult Blood Test (FOBT)/Fecal Immunochemical Test (FIT)  * Fecal DNA/Cologuard Test  * Flexible Sigmoidoscopy Age: 45-75 years old   Colonoscopy: every 10 years (may be performed more frequently if at higher risk)  OR  FOBT/FIT: every 1 year  OR  Cologuard: every 3 years  OR  Sigmoidoscopy: every 5 years  Screening may be recommended earlier than age 45 if at higher risk for colorectal cancer. Also, an individualized decision between you and your healthcare provider will decide whether screening between the ages of 76-85 would be appropriate. Colonoscopy: Not on file  FOBT/FIT: Not on file  Cologuard: Not on file  Sigmoidoscopy: Not on file          Breast Cancer Screening Age: 40+ years old  Frequency: every 1-2 years  Not required if history of left and right mastectomy Mammogram: Not on file        Cervical Cancer Screening Between the ages of 21-29, pap smear recommended once every 3 years.   Between the ages of 30-65, can perform pap smear with HPV co-testing every 5 years.   Recommendations may differ for women with a history of total hysterectomy, cervical cancer, or abnormal pap smears in past. Pap Smear: 12/13/2022    Screening Current   Hepatitis C Screening Once for adults born between 1945 and 1965  More frequently in patients at high risk for Hepatitis C Hep  C Antibody: 06/03/2022    Screening Current   Diabetes Screening 1-2 times per year if you're at risk for diabetes or have pre-diabetes Fasting glucose: 212 mg/dL (12/5/2023)  A1C: 7.9 (12/7/2023)  Screening Not Indicated  History Diabetes   Cholesterol Screening Once every 5 years if you don't have a lipid disorder. May order more often based on risk factors. Lipid panel: 12/05/2023    Screening Not Indicated  History Lipid Disorder     Other Preventive Screenings Covered by Medicare:  Abdominal Aortic Aneurysm (AAA) Screening: covered once if your at risk. You're considered to be at risk if you have a family history of AAA.  Lung Cancer Screening: covers low dose CT scan once per year if you meet all of the following conditions: (1) Age 55-77; (2) No signs or symptoms of lung cancer; (3) Current smoker or have quit smoking within the last 15 years; (4) You have a tobacco smoking history of at least 20 pack years (packs per day multiplied by number of years you smoked); (5) You get a written order from a healthcare provider.  Glaucoma Screening: covered annually if you're considered high risk: (1) You have diabetes OR (2) Family history of glaucoma OR (3)  aged 50 and older OR (4)  American aged 65 and older  Osteoporosis Screening: covered every 2 years if you meet one of the following conditions: (1) You're estrogen deficient and at risk for osteoporosis based off medical history and other findings; (2) Have a vertebral abnormality; (3) On glucocorticoid therapy for more than 3 months; (4) Have primary hyperparathyroidism; (5) On osteoporosis medications and need to assess response to drug therapy.   Last bone density test (DXA Scan): Not on file.  HIV Screening: covered annually if you're between the age of 15-65. Also covered annually if you are younger than 15 and older than 65 with risk factors for HIV infection. For pregnant patients, it is covered up to 3 times per  pregnancy.    Immunizations:  Immunization Recommendations   Influenza Vaccine Annual influenza vaccination during flu season is recommended for all persons aged >= 6 months who do not have contraindications   Pneumococcal Vaccine   * Pneumococcal conjugate vaccine = PCV13 (Prevnar 13), PCV15 (Vaxneuvance), PCV20 (Prevnar 20)  * Pneumococcal polysaccharide vaccine = PPSV23 (Pneumovax) Adults 19-63 yo with certain risk factors or if 65+ yo  If never received any pneumonia vaccine: recommend Prevnar 20 (PCV20)  Give PCV20 if previously received 1 dose of PCV13 or PPSV23   Hepatitis B Vaccine 3 dose series if at intermediate or high risk (ex: diabetes, end stage renal disease, liver disease)   Respiratory syncytial virus (RSV) Vaccine - COVERED BY MEDICARE PART D  * RSVPreF3 (Arexvy) CDC recommends that adults 60 years of age and older may receive a single dose of RSV vaccine using shared clinical decision-making (SCDM)   Tetanus (Td) Vaccine - COST NOT COVERED BY MEDICARE PART B Following completion of primary series, a booster dose should be given every 10 years to maintain immunity against tetanus. Td may also be given as tetanus wound prophylaxis.   Tdap Vaccine - COST NOT COVERED BY MEDICARE PART B Recommended at least once for all adults. For pregnant patients, recommended with each pregnancy.   Shingles Vaccine (Shingrix) - COST NOT COVERED BY MEDICARE PART B  2 shot series recommended in those 19 years and older who have or will have weakened immune systems or those 50 years and older     Health Maintenance Due:      Topic Date Due   • Breast Cancer Screening: Mammogram  Never done   • Colorectal Cancer Screening  Never done   • Cervical Cancer Screening  12/13/2027   • HIV Screening  Completed   • Hepatitis C Screening  Completed     Immunizations Due:      Topic Date Due   • Pneumococcal Vaccine: Pediatrics (0 to 5 Years) and At-Risk Patients (6 to 64 Years) (1 of 2 - PCV) Never done   • Influenza Vaccine (1)  Never done   • COVID-19 Vaccine (1 - 2023-24 season) Never done     Advance Directives   What are advance directives?  Advance directives are legal documents that state your wishes and plans for medical care. These plans are made ahead of time in case you lose your ability to make decisions for yourself. Advance directives can apply to any medical decision, such as the treatments you want, and if you want to donate organs.   What are the types of advance directives?  There are many types of advance directives, and each state has rules about how to use them. You may choose a combination of any of the following:  Living will:  This is a written record of the treatment you want. You can also choose which treatments you do not want, which to limit, and which to stop at a certain time. This includes surgery, medicine, IV fluid, and tube feedings.   Durable power of  for healthcare (DPAHC):  This is a written record that states who you want to make healthcare choices for you when you are unable to make them for yourself. This person, called a proxy, is usually a family member or a friend. You may choose more than 1 proxy.  Do not resuscitate (DNR) order:  A DNR order is used in case your heart stops beating or you stop breathing. It is a request not to have certain forms of treatment, such as CPR. A DNR order may be included in other types of advance directives.  Medical directive:  This covers the care that you want if you are in a coma, near death, or unable to make decisions for yourself. You can list the treatments you want for each condition. Treatment may include pain medicine, surgery, blood transfusions, dialysis, IV or tube feedings, and a ventilator (breathing machine).  Values history:  This document has questions about your views, beliefs, and how you feel and think about life. This information can help others choose the care that you would choose.  Why are advance directives important?  An advance  directive helps you control your care. Although spoken wishes may be used, it is better to have your wishes written down. Spoken wishes can be misunderstood, or not followed. Treatments may be given even if you do not want them. An advance directive may make it easier for your family to make difficult choices about your care.   Weight Management   Why it is important to manage your weight:  Being overweight increases your risk of health conditions such as heart disease, high blood pressure, type 2 diabetes, and certain types of cancer. It can also increase your risk for osteoarthritis, sleep apnea, and other respiratory problems. Aim for a slow, steady weight loss. Even a small amount of weight loss can lower your risk of health problems.  How to lose weight safely:  A safe and healthy way to lose weight is to eat fewer calories and get regular exercise. You can lose up about 1 pound a week by decreasing the number of calories you eat by 500 calories each day.   Healthy meal plan for weight management:  A healthy meal plan includes a variety of foods, contains fewer calories, and helps you stay healthy. A healthy meal plan includes the following:  Eat whole-grain foods more often.  A healthy meal plan should contain fiber. Fiber is the part of grains, fruits, and vegetables that is not broken down by your body. Whole-grain foods are healthy and provide extra fiber in your diet. Some examples of whole-grain foods are whole-wheat breads and pastas, oatmeal, brown rice, and bulgur.  Eat a variety of vegetables every day.  Include dark, leafy greens such as spinach, kale, radhika greens, and mustard greens. Eat yellow and orange vegetables such as carrots, sweet potatoes, and winter squash.   Eat a variety of fruits every day.  Choose fresh or canned fruit (canned in its own juice or light syrup) instead of juice. Fruit juice has very little or no fiber.  Eat low-fat dairy foods.  Drink fat-free (skim) milk or 1% milk.  Eat fat-free yogurt and low-fat cottage cheese. Try low-fat cheeses such as mozzarella and other reduced-fat cheeses.  Choose meat and other protein foods that are low in fat.  Choose beans or other legumes such as split peas or lentils. Choose fish, skinless poultry (chicken or turkey), or lean cuts of red meat (beef or pork). Before you cook meat or poultry, cut off any visible fat.   Use less fat and oil.  Try baking foods instead of frying them. Add less fat, such as margarine, sour cream, regular salad dressing and mayonnaise to foods. Eat fewer high-fat foods. Some examples of high-fat foods include french fries, doughnuts, ice cream, and cakes.  Eat fewer sweets.  Limit foods and drinks that are high in sugar. This includes candy, cookies, regular soda, and sweetened drinks.  Exercise:  Exercise at least 30 minutes per day on most days of the week. Some examples of exercise include walking, biking, dancing, and swimming. You can also fit in more physical activity by taking the stairs instead of the elevator or parking farther away from stores. Ask your healthcare provider about the best exercise plan for you.      © Copyright [employer] 2018 Information is for End User's use only and may not be sold, redistributed or otherwise used for commercial purposes. All illustrations and images included in CareNotes® are the copyrighted property of [employer] or [employer]

## 2024-03-25 NOTE — ASSESSMENT & PLAN NOTE
Mild depression w/PHQ score of 6 currently   Will continue same lexapro and bupropion doses as rx'd  Return in 6 months

## 2024-04-09 ENCOUNTER — OFFICE VISIT (OUTPATIENT)
Dept: CARDIOLOGY CLINIC | Facility: CLINIC | Age: 59
End: 2024-04-09
Payer: MEDICARE

## 2024-04-09 VITALS
DIASTOLIC BLOOD PRESSURE: 58 MMHG | BODY MASS INDEX: 32.66 KG/M2 | SYSTOLIC BLOOD PRESSURE: 100 MMHG | WEIGHT: 173 LBS | HEIGHT: 61 IN | HEART RATE: 68 BPM

## 2024-04-09 DIAGNOSIS — Z95.1 S/P CABG X 2: ICD-10-CM

## 2024-04-09 DIAGNOSIS — I10 PRIMARY HYPERTENSION: ICD-10-CM

## 2024-04-09 DIAGNOSIS — E78.2 MIXED HYPERLIPIDEMIA: ICD-10-CM

## 2024-04-09 DIAGNOSIS — E11.65 TYPE 2 DIABETES MELLITUS WITH HYPERGLYCEMIA, WITHOUT LONG-TERM CURRENT USE OF INSULIN (HCC): ICD-10-CM

## 2024-04-09 DIAGNOSIS — I25.10 CORONARY ARTERY DISEASE INVOLVING NATIVE CORONARY ARTERY OF NATIVE HEART WITHOUT ANGINA PECTORIS: Primary | ICD-10-CM

## 2024-04-09 DIAGNOSIS — I25.810 CORONARY ARTERY DISEASE INVOLVING CORONARY BYPASS GRAFT OF NATIVE HEART WITHOUT ANGINA PECTORIS: ICD-10-CM

## 2024-04-09 DIAGNOSIS — Z95.5 S/P INSERTION OF NON-DRUG ELUTING CORONARY ARTERY STENT: ICD-10-CM

## 2024-04-09 PROCEDURE — 99214 OFFICE O/P EST MOD 30 MIN: CPT | Performed by: INTERNAL MEDICINE

## 2024-04-09 PROCEDURE — 93000 ELECTROCARDIOGRAM COMPLETE: CPT | Performed by: INTERNAL MEDICINE

## 2024-04-09 NOTE — PROGRESS NOTES
Cardiology Follow-up    Marian Myers  [id number]  1965  [facility] [place] [address] [address]  [address] [id number] [phone]    1. Coronary artery disease involving native coronary artery of native heart without angina pectoris  POCT ECG      2. Coronary artery disease involving coronary bypass graft of native heart without angina pectoris        3. S/P CABG x 2        4. S/P insertion of non-drug eluting coronary artery stent        5. Primary hypertension        6. Mixed hyperlipidemia        7. Type 2 diabetes mellitus with hyperglycemia, without long-term current use of insulin ([facility])              Discussion/Summary:    1.  CAD - Marian had drug-eluting stent placed to her left circumflex in 2011, CABG x2 in 2012, and then a drug-eluting stent placed to her SVG to RCA graft in 2015.  She has done well since.  We did updated testing back in November 2019 which was for the most part unremarkable.  She has a fixed inferior defect, that likely could be from diaphragmatic attenuation artifact or a small scar.  No ischemia seen.  She has normal LV systolic function.  No testing is needed at this time.  We will see her back in 1 year.    2.  Hypertension - Her blood pressure has been under good control and if anything runs a bit on the low side.  I told her she can stop the low-dose HCTZ and continue her Coreg and losartan.  She should periodically check her blood pressure at home.    3.  Hypercholesterolemia - She was switched to rosuvastatin 40 mg daily.  Her LDL is near goal.    4.  GABI - She went back on CPAP a few years back, and she is intermittently on it now.  She should follow-up with pulmonary/sleep medicine.      HPI:    Mrs. Myers comes in for follow-up given her cardiac history.  Marian had coronary artery disease diagnosed back in 2011. In January 2011 she had a drug-eluting stent placed to the  left circumflex.  Then in 2012 she had CABG x2 with a LIMA to the LAD and a SVG to RCA.  She had this work done at [facility].  She was then following with [facility], in 2015 had a cardiac catheterization that showed critical stenosis in the distal portion of her vein graft to her RCA.  She received a drug-eluting stent to this region.  She has normal LV systolic function without valve disease by echocardiogram earlier this year.  She does have obstructive sleep apnea and was started on CPAP.  But currently is not using it.    At our initial consultation Marian was noticing some increasing fatigue, shortness of breath and exercise intolerance.  She was off CPAP as she had moved from [place] and had not on packed it as of yet.  We did updated testing that included a stress nuclear study and echocardiogram in 2019.  Echocardiogram showed normal LV systolic function without significant valve disease.  Stress nuclear study showed a fixed inferior defect, either representing an old infarct versus attenuation artifact.  No ischemia seen.  She had another echocardiogram last year that showed no significant change.    One issue with Marian has been uncontrolled diabetes mellitus.  Her hemoglobin A1c's have ranged between 9 and 11.  Last year we started Jardiance 10 mg daily, but she had some side effects to this and switch to Farxiga.  She has yet to go on insulin but feels this discussion is coming soon.    Marian overall feels well.  She has not had any symptoms of angina.  She has some musculoskeletal pains, particularly of her back but no cardiac symptoms.  She denies shortness of breath or any signs/symptoms of CHF.  She denies palpitations, lightheadedness or any syncope.  She tries to remain active.      Patient Active Problem List   Diagnosis    Coronary artery disease involving native coronary artery of native heart without angina pectoris    S/P CABG x 2    Coronary artery disease involving  coronary bypass graft of native heart without angina pectoris    S/P insertion of non-drug eluting coronary artery stent    Hypertension    Hyperlipidemia    Sleep apnea    Type 2 diabetes mellitus, without long-term current use of insulin ([facility])    BMI 35.0-35.9,adult    Insomnia    Depression, recurrent ([facility])    Chronic obstructive pulmonary disease, unspecified COPD type ([facility])    Microalbuminuria due to type 2 diabetes mellitus  ([facility])     Past Medical History:   Diagnosis Date    Circulation problem     Coronary artery disease     Coronary artery disease involving coronary bypass graft of native heart without angina pectoris 2019    Depression     Diabetes ([facility])     Dyspnea on exertion 2019    Glaucoma     Hearing problem     Heart attack ([facility])     X 3    Heart failure ([facility])     Heart valve problem     High blood pressure     High cholesterol     Hypoxia 2022    Kidney stone     left    Moderate episode of recurrent major depressive disorder ([facility]) 2023    Obesity, morbid ([facility]) 2021    Open injury of heart     Pyelitis 2022    Sepsis ([facility]) 2022    Sleep apnea      Social History     Socioeconomic History    Marital status:      Spouse name: Not on file    Number of children: Not on file    Years of education: Not on file    Highest education level: Not on file   Occupational History    Not on file   Tobacco Use    Smoking status: Former     Current packs/day: 0.00     Types: Cigarettes     Quit date:      Years since quittin.2    Smokeless tobacco: Never   Vaping Use    Vaping status: Never Used   Substance and Sexual Activity    Alcohol use: Yes     Comment: special events    Drug use: Yes     Types: Marijuana     Comment: daily prn    Sexual activity: Not on file   Other Topics Concern    Not on file   Social History Narrative    Not on file     Social Determinants of Health     Financial Resource Strain: Low Risk  (3/23/2023)    Overall Financial  Resource Strain (CARDIA)     Difficulty of Paying Living Expenses: Not hard at all   Food Insecurity: No Food Insecurity (3/25/2024)    Hunger Vital Sign     Worried About Running Out of Food in the Last Year: Never true     Ran Out of Food in the Last Year: Never true   Transportation Needs: No Transportation Needs (3/25/2024)    PRAPARE - Transportation     Lack of Transportation (Medical): No     Lack of Transportation (Non-Medical): No   Physical Activity: Not on file   Stress: Not on file   Social Connections: Not on file   Intimate Partner Violence: Not on file   Housing Stability: Unknown (3/25/2024)    Housing Stability Vital Sign     Unable to Pay for Housing in the Last Year: No     Number of Places Lived in the Last Year: Not on file     Unstable Housing in the Last Year: No      Family History   Problem Relation Age of Onset    Hypertension Father     Coronary artery disease Father     Sudden death Father         cardiac 2014    No Known Problems Son     No Known Problems Daughter      Past Surgical History:   Procedure Laterality Date    CORONARY ARTERY BYPASS GRAFT  2011    CORONARY STENT PLACEMENT  2010 January    CORONARY STENT PLACEMENT  2015 June    IR NEPHROSTOMY TUBE CHECK/CHANGE/REPOSITION/REINSERTION/UPSIZE  08/18/2022    IR NEPHROSTOMY TUBE PLACEMENT  07/27/2022    TONSILECTOMY AND ADNOIDECTOMY      VAGINAL DELIVERY  1987    VAGINAL DELIVERY  1989       Current Outpatient Medications:     aspirin (ECOTRIN LOW STRENGTH) 81 mg EC tablet, Take 81 mg by mouth daily, Disp: , Rfl:     buPROPion (WELLBUTRIN XL) 150 mg 24 hr tablet, Take 1 tablet (150 mg total) by mouth every morning, Disp: 90 tablet, Rfl: 1    carvedilol (COREG) 6.25 mg tablet, Take 1 tablet (6.25 mg total) by mouth 2 (two) times a day with meals, Disp: 180 tablet, Rfl: 3    dapagliflozin (Farxiga) 5 MG TABS, Take 1 tablet (5 mg total) by mouth daily, Disp: 30 tablet, Rfl: 6    escitalopram (LEXAPRO) 10 mg tablet, TAKE 1 TABLET  "BY MOUTH TWICE A DAY, Disp: 180 tablet, Rfl: 1    glipiZIDE (GLUCOTROL XL) 10 mg 24 hr tablet, Take 1 tablet (10 mg total) by mouth 2 (two) times a day, Disp: 180 tablet, Rfl: 0    glucose blood (OneTouch Ultra) test strip, Use 1 daily, Disp: 90 strip, Rfl: 2    Lancets 28G MISC, Use 1 Box 3 (three) times a day One Touch Ultra Mini lancets, Disp: 3 each, Rfl: 2    losartan (COZAAR) 50 mg tablet, TAKE 1 TABLET BY MOUTH EVERY DAY, Disp: 90 tablet, Rfl: 1    rosuvastatin (CRESTOR) 40 MG tablet, Take 1 tablet (40 mg total) by mouth daily, Disp: 90 tablet, Rfl: 3    zolpidem (AMBIEN) 10 mg tablet, TAKE 1 TABLET BY MOUTH DAILY AT BEDTIME AS NEEDED FOR SLEEP., Disp: 90 tablet, Rfl: 0    MELATONIN PO, Take 10 mg by mouth daily at bedtime (Patient not taking: Reported on 1/9/2024), Disp: , Rfl:   Allergies   Allergen Reactions    Metformin Diarrhea    Penicillins Hives     Vitals:    04/09/24 0811   BP: 100/58   BP Location: Left arm   Patient Position: Sitting   Cuff Size: Standard   Pulse: 68   Weight: 78.5 kg (173 lb)   Height: 5' 1\" (1.549 m)       Labs:  Lab Results   Component Value Date    K 3.9 12/05/2023     12/05/2023    CO2 28 12/05/2023    BUN 14 12/05/2023    CREATININE 0.91 12/05/2023    CALCIUM 9.6 12/05/2023     Lab Results   Component Value Date    WBC 8.86 03/20/2023    HGB 14.6 03/20/2023    HCT 44.0 03/20/2023    MCV 87 03/20/2023     03/20/2023       Imaging:  ECG today sinus rhythm with a first-degree AV block, right axis deviation and nonspecific T wave changes.      STRESS NUCLEAR (11/2019):  SUMMARY:  -  Stress results: Duration of exercise was 6 min and 45 sec. Target heart rate was not achieved. The patient experienced chest pain during stress; pain resolved spontaneously.  -  ECG conclusions: The stress ECG was negative for ischemia and normal.  -  Perfusion imaging: There was a small to moderate, moderately severe, fixed myocardial perfusion defect of the inferolateral wall.  -  " "Gated SPECT: The calculated left ventricular ejection fraction was 67 %. Left ventricular ejection fraction was within normal limits by visual estimate. There was no left ventricular regional abnormality.     IMPRESSIONS: Abnormal study after maximal exercise without reproduction of symptoms. There was a small infarct in the inferolateral region. Left ventricular systolic function was normal.      ECHO (8/24/2022):    Left Ventricle: Left ventricular cavity size is normal. Wall thickness is normal. The left ventricular ejection fraction is 55%. Systolic function is normal. Wall motion is normal. Diastolic function is mildly abnormal, consistent with grade I (abnormal) relaxation.      Review of Systems:  Review of Systems   Constitutional:  Positive for fatigue.   HENT: Negative.     Eyes: Negative.    Respiratory: Negative.     Cardiovascular: Negative.    Gastrointestinal: Negative.    Musculoskeletal:  Positive for back pain.   Skin: Negative.    Allergic/Immunologic: Negative.    Neurological: Negative.    Hematological: Negative.    Psychiatric/Behavioral: Negative.     All other systems reviewed and are negative.    Vitals:    04/09/24 0811   BP: 100/58   BP Location: Left arm   Patient Position: Sitting   Cuff Size: Standard   Pulse: 68   Weight: 78.5 kg (173 lb)   Height: 5' 1\" (1.549 m)     Physical Exam  Vitals and nursing note reviewed.   Constitutional:       Appearance: She is well-developed.   HENT:      Head: Normocephalic and atraumatic.   Eyes:      General: No scleral icterus.        Right eye: No discharge.         Left eye: No discharge.      Pupils: Pupils are equal, round, and reactive to light.   Neck:      Thyroid: No thyromegaly.      Vascular: No JVD.   Cardiovascular:      Rate and Rhythm: Normal rate and regular rhythm. No extrasystoles are present.     Pulses: Normal pulses. No decreased pulses.      Heart sounds: Normal heart sounds, S1 normal and S2 normal. No murmur heard.     No " friction rub. No gallop.   Pulmonary:      Effort: Pulmonary effort is normal. No respiratory distress.      Breath sounds: Normal breath sounds. No wheezing, rhonchi or rales.   Abdominal:      General: Bowel sounds are normal. There is no distension.      Palpations: Abdomen is soft.      Tenderness: There is no abdominal tenderness.   Musculoskeletal:         General: No tenderness or deformity. Normal range of motion.      Cervical back: Normal range of motion and neck supple.      Right lower leg: No edema.      Left lower leg: No edema.   Skin:     General: Skin is warm and dry.      Findings: No rash.   Neurological:      Mental Status: She is alert and oriented to person, place, and time.      Cranial Nerves: No cranial nerve deficit.   Psychiatric:         Thought Content: Thought content normal.         Judgment: Judgment normal.       Counseling / Coordination of Care  Total office time spent today 25 minutes.  Greater than 50% of total time was spent with the patient and / or family counseling and / or coordination of care.

## 2024-04-25 DIAGNOSIS — G47.00 INSOMNIA, UNSPECIFIED TYPE: ICD-10-CM

## 2024-04-26 RX ORDER — ZOLPIDEM TARTRATE 10 MG/1
TABLET ORAL
Qty: 90 TABLET | Refills: 0 | Status: SHIPPED | OUTPATIENT
Start: 2024-04-26

## 2024-05-10 DIAGNOSIS — F32.A DEPRESSION, UNSPECIFIED DEPRESSION TYPE: ICD-10-CM

## 2024-05-10 DIAGNOSIS — F33.9 DEPRESSION, RECURRENT (HCC): ICD-10-CM

## 2024-05-10 DIAGNOSIS — E11.65 TYPE 2 DIABETES MELLITUS WITH HYPERGLYCEMIA, WITHOUT LONG-TERM CURRENT USE OF INSULIN (HCC): ICD-10-CM

## 2024-05-10 DIAGNOSIS — I10 ESSENTIAL HYPERTENSION: ICD-10-CM

## 2024-05-10 RX ORDER — GLIPIZIDE 10 MG/1
10 TABLET, FILM COATED, EXTENDED RELEASE ORAL 2 TIMES DAILY
Qty: 180 TABLET | Refills: 1 | Status: SHIPPED | OUTPATIENT
Start: 2024-05-10

## 2024-05-10 RX ORDER — CARVEDILOL 6.25 MG/1
6.25 TABLET ORAL 2 TIMES DAILY WITH MEALS
Qty: 180 TABLET | Refills: 1 | Status: SHIPPED | OUTPATIENT
Start: 2024-05-10

## 2024-05-14 RX ORDER — ESCITALOPRAM OXALATE 10 MG/1
TABLET ORAL
Qty: 180 TABLET | Refills: 1 | Status: SHIPPED | OUTPATIENT
Start: 2024-05-14

## 2024-05-14 RX ORDER — BUPROPION HYDROCHLORIDE 150 MG/1
150 TABLET ORAL EVERY MORNING
Qty: 90 TABLET | Refills: 1 | Status: SHIPPED | OUTPATIENT
Start: 2024-05-14

## 2024-05-31 ENCOUNTER — OFFICE VISIT (OUTPATIENT)
Dept: ENDOCRINOLOGY | Facility: HOSPITAL | Age: 59
End: 2024-05-31
Payer: MEDICARE

## 2024-05-31 VITALS
HEART RATE: 56 BPM | SYSTOLIC BLOOD PRESSURE: 100 MMHG | HEIGHT: 61 IN | DIASTOLIC BLOOD PRESSURE: 76 MMHG | BODY MASS INDEX: 33.12 KG/M2 | WEIGHT: 175.4 LBS

## 2024-05-31 DIAGNOSIS — E11.29 MICROALBUMINURIA DUE TO TYPE 2 DIABETES MELLITUS  (HCC): ICD-10-CM

## 2024-05-31 DIAGNOSIS — E78.2 MIXED HYPERLIPIDEMIA: ICD-10-CM

## 2024-05-31 DIAGNOSIS — R80.9 MICROALBUMINURIA DUE TO TYPE 2 DIABETES MELLITUS  (HCC): ICD-10-CM

## 2024-05-31 DIAGNOSIS — I10 PRIMARY HYPERTENSION: ICD-10-CM

## 2024-05-31 DIAGNOSIS — E11.65 TYPE 2 DIABETES MELLITUS WITH HYPERGLYCEMIA, WITHOUT LONG-TERM CURRENT USE OF INSULIN (HCC): Primary | ICD-10-CM

## 2024-05-31 LAB — SL AMB POCT HEMOGLOBIN AIC: 6.8 (ref ?–6.5)

## 2024-05-31 PROCEDURE — 83036 HEMOGLOBIN GLYCOSYLATED A1C: CPT | Performed by: INTERNAL MEDICINE

## 2024-05-31 PROCEDURE — 99214 OFFICE O/P EST MOD 30 MIN: CPT | Performed by: INTERNAL MEDICINE

## 2024-05-31 RX ORDER — DAPAGLIFLOZIN 5 MG/1
5 TABLET, FILM COATED ORAL DAILY
Qty: 90 TABLET | Refills: 3 | Status: SHIPPED | OUTPATIENT
Start: 2024-05-31

## 2024-05-31 NOTE — PATIENT INSTRUCTIONS
Hgba1c is 6.8%. this is excellent.     Continue the same farxiga and glipizide.     Continue to work on diet and activity.     Make sure to drink at least 8 8 ounce glasses water a day.     Follow up in 3 months with blood work.

## 2024-05-31 NOTE — PROGRESS NOTES
6/3/2024    Assessment & Plan      Diagnoses and all orders for this visit:    Type 2 diabetes mellitus with hyperglycemia, without long-term current use of insulin (HCC)  -     dapagliflozin (Farxiga) 5 MG TABS; Take 1 tablet (5 mg total) by mouth daily  -     Comprehensive metabolic panel; Future  -     Hemoglobin A1C; Future  -     TSH, 3rd generation; Future  -     Lipid Panel with Direct LDL reflex; Future  -     CBC and differential; Future  -     Lipid Panel with Direct LDL reflex; Future  -     POCT hemoglobin A1c    Microalbuminuria due to type 2 diabetes mellitus  (HCC)  -     Comprehensive metabolic panel; Future  -     Hemoglobin A1C; Future  -     TSH, 3rd generation; Future  -     Lipid Panel with Direct LDL reflex; Future  -     CBC and differential; Future  -     Lipid Panel with Direct LDL reflex; Future    Mixed hyperlipidemia  -     Comprehensive metabolic panel; Future  -     Hemoglobin A1C; Future  -     TSH, 3rd generation; Future  -     Lipid Panel with Direct LDL reflex; Future  -     CBC and differential; Future  -     Lipid Panel with Direct LDL reflex; Future    Primary hypertension  -     Comprehensive metabolic panel; Future  -     Hemoglobin A1C; Future  -     TSH, 3rd generation; Future  -     Lipid Panel with Direct LDL reflex; Future  -     CBC and differential; Future  -     Lipid Panel with Direct LDL reflex; Future          Assessment & Plan  1. Type 2 diabetes.  The patient's most recent hemoglobin A1c level is 6.8 percent, indicating excellent diabetes control. The current treatment plan, which includes Glipizide and Farxiga, will be maintained. It was emphasized that the patient should consume at least 8-ounce glasses of water daily while on Farxiga to prevent dehydration. Continuation of dietary and physical activity was also recommended.    2. Diabetic microalbuminuria.  The patient is currently on both Losartan and Farxiga. A urine microalbumin to creatinine ratio will be  conducted in the future.    3. Hypertension.  The patient's blood pressure is normotensive in the office with the current medication regimen. The patient will maintain the current regimen of Carvedilol and Losartan.    4. Hyperlipidemia.  The patient will continue on Rosuvastatin 40 mg daily.  I will check a lipid profile with her next visit.    Follow-up  A follow-up appointment is scheduled for 3 months from now, with pre-visit hemoglobin A1c, CMP, CBC, lipid panel, and TSH to be conducted prior to the visit.        CC: Diabetes type II, blood pressure, lipid follow-up     History of Present Illness    HPI: Marian Myers is a 58-year-old female with type 2 diabetes with microalbuminuria diagnosed within the last 10 years, hypertension, hyperlipidemia for follow-up visit.    Diabetes complications include microalbuminuria.  She denies heart attack, stroke, claudication, neuropathy, retinopathy.    The patient is currently on a regimen of Glipizide XL 10 mg twice daily and Farxiga 5 mg daily for diabetes management. She reports frequent urination and nocturnal urination, although this does not occur nightly. Despite excessive thirst, she maintains adequate hydration. She experiences hunger, often accompanied by abdominal pain, and postprandial nausea. Despite being able to lift a gallon of milk, she finds it challenging to carry it far. Her daily fluid intake is minimal.   She has ceased monitoring her blood glucose levels due to dietary restrictions, including eliminating ice cream and chocolate milk from her diet. She denies experiencing hypoglycemia, but reports blurred vision.     Her last ophthalmology visit was conducted in 03/2023. She experiences occasional numbness and tingling in her feet, but denies any foot wounds. She denies chest pain, but reports shortness of breath when ascending 2 flights of stairs.     Her current medication regimen includes Carvedilol 6.25 mg twice daily and Losartan 50 mg once  daily. She denies frequent headaches, lightheadedness, or dizziness.    Blood Sugar/Glucometer/Pump/CGM review: She does not test her blood sugars.      Historical Information   Past Medical History:   Diagnosis Date    Circulation problem     Coronary artery disease involving coronary bypass graft of native heart without angina pectoris 2019    Diabetes ([facility])     Dyspnea on exertion 2019    Glaucoma     Hearing problem     Heart attack ([facility])     X 3    Heart valve problem     High blood pressure     High cholesterol     Hypoxia 2022    Kidney stone     left    Moderate episode of recurrent major depressive disorder ([facility]) 2023    Obesity, morbid ([facility]) 2021    Open injury of heart     Pyelitis 2022    Sepsis ([facility]) 2022    Sleep apnea      Past Surgical History:   Procedure Laterality Date    CORONARY ARTERY BYPASS GRAFT      CORONARY STENT PLACEMENT  2010    CORONARY STENT PLACEMENT  2015    IR NEPHROSTOMY TUBE CHECK/CHANGE/REPOSITION/REINSERTION/UPSIZE  2022    IR NEPHROSTOMY TUBE PLACEMENT  2022    TONSILECTOMY AND ADNOIDECTOMY      VAGINAL DELIVERY      VAGINAL DELIVERY       Social History   Social History     Substance and Sexual Activity   Alcohol Use Yes    Comment: special events     Social History     Substance and Sexual Activity   Drug Use Yes    Types: Marijuana    Comment: daily prn     Social History     Tobacco Use   Smoking Status Former    Current packs/day: 0.00    Types: Cigarettes    Quit date:     Years since quittin.4   Smokeless Tobacco Never     Family History:   Family History   Problem Relation Age of Onset    Hypertension Father     Coronary artery disease Father     Sudden death Father         cardiac     No Known Problems Son     No Known Problems Daughter        Meds/Allergies   Current Outpatient Medications   Medication Sig Dispense Refill    aspirin (ECOTRIN LOW STRENGTH) 81 mg EC  "tablet Take 81 mg by mouth daily      buPROPion (WELLBUTRIN XL) 150 mg 24 hr tablet TAKE 1 TABLET BY MOUTH EVERY DAY IN THE MORNING 90 tablet 1    carvedilol (COREG) 6.25 mg tablet TAKE 1 TABLET BY MOUTH TWICE A DAY WITH MEALS 180 tablet 1    dapagliflozin (Farxiga) 5 MG TABS Take 1 tablet (5 mg total) by mouth daily 90 tablet 3    escitalopram (LEXAPRO) 10 mg tablet TAKE 1 TABLET BY MOUTH TWICE A  tablet 1    glipiZIDE (GLUCOTROL XL) 10 mg 24 hr tablet TAKE 1 TABLET BY MOUTH TWICE A  tablet 1    glucose blood (OneTouch Ultra) test strip Use 1 daily 90 strip 2    Lancets 28G MISC Use 1 Box 3 (three) times a day One Touch Ultra Mini lancets 3 each 2    losartan (COZAAR) 50 mg tablet TAKE 1 TABLET BY MOUTH EVERY DAY 90 tablet 1    rosuvastatin (CRESTOR) 40 MG tablet Take 1 tablet (40 mg total) by mouth daily 90 tablet 3    zolpidem (AMBIEN) 10 mg tablet TAKE 1 TABLET BY MOUTH DAILY AT BEDTIME AS NEEDED FOR SLEEP. 90 tablet 0    MELATONIN PO Take 10 mg by mouth daily at bedtime (Patient not taking: Reported on 1/9/2024)       No current facility-administered medications for this visit.     Allergies   Allergen Reactions    Metformin Diarrhea    Penicillins Hives       Objective   Vitals: Blood pressure 100/76, pulse 56, height 5' 1\" (1.549 m), weight 79.6 kg (175 lb 6.4 oz).  Invasive Devices       None                   Physical Exam  Physical exam normal with pertinent positives and negatives.    Thyroid is normal in size. No palpable nodules.  Lungs are clear to auscultation.  Heart has a regular rate and rhythm. No murmurs.  No lower extremity edema in the musculoskeletal system.      The history was obtained from the review of the chart and from the patient.    Lab Results:    Most recent Alc is  Lab Results   Component Value Date    HGBA1C 6.8 (A) 05/31/2024               Lab Results   Component Value Date    CREATININE 0.91 12/05/2023    CREATININE 0.91 09/12/2023    CREATININE 0.93 06/19/2023    " BUN 14 12/05/2023    K 3.9 12/05/2023     12/05/2023    CO2 28 12/05/2023     eGFR   Date Value Ref Range Status   12/05/2023 69 ml/min/1.73sq m Final         Lab Results   Component Value Date    HDL 34 (L) 12/05/2023    TRIG 152 (H) 12/05/2023       Lab Results   Component Value Date    ALT 19 12/05/2023    AST 23 12/05/2023    ALKPHOS 84 12/05/2023                 No future appointments.

## 2024-06-04 DIAGNOSIS — E78.2 MIXED HYPERLIPIDEMIA: ICD-10-CM

## 2024-06-04 DIAGNOSIS — I10 ESSENTIAL HYPERTENSION: ICD-10-CM

## 2024-06-05 RX ORDER — ROSUVASTATIN CALCIUM 40 MG/1
40 TABLET, COATED ORAL DAILY
Qty: 90 TABLET | Refills: 1 | Status: SHIPPED | OUTPATIENT
Start: 2024-06-05

## 2024-06-05 RX ORDER — LOSARTAN POTASSIUM 50 MG/1
TABLET ORAL
Qty: 90 TABLET | Refills: 1 | Status: SHIPPED | OUTPATIENT
Start: 2024-06-05

## 2024-06-07 ENCOUNTER — TELEPHONE (OUTPATIENT)
Age: 59
End: 2024-06-07

## 2024-06-07 NOTE — TELEPHONE ENCOUNTER
6/5/24 12:03 PM  Note      Reason for call:   [] Refill   [x] Prior Auth  [] Other:      Office:   [] PCP/Provider -   [x] Specialty/Provider - Endo     Medication: dapagliflozin (Farxiga) 5 MG TABS      Dose/Frequency: Take 1 tablet (5 mg total) by mouth daily      Quantity: 90     Pharmacy: [facility] #[id number] - [place], [place] - [address]       Does the patient have enough for 3 days?   [] Yes   [x] No - Send as HP to POD

## 2024-06-11 NOTE — TELEPHONE ENCOUNTER
PA for FARXIGA 5MG    Submitted via    []CMM-KEY    [][employer]-Case ID #  [id number]  []Faxed to plan   []Other website    []Phone call Case ID #      Office notes sent, clinical questions answered. Awaiting determination    Turnaround time for your insurance to make a decision on your Prior Authorization can take 7-21 business days.

## 2024-07-09 ENCOUNTER — TELEPHONE (OUTPATIENT)
Age: 59
End: 2024-07-09

## 2024-07-09 NOTE — TELEPHONE ENCOUNTER
"Pt was last seen on 4/9/2024.    Received call from pt stating she believes she is having some side effects of her dapagliflozin (farxiga) prescribed by her dietician, Susan Freeman MD. Pt stated she would like Dr. Henderson's opinion on this rather than calling her dietician or pcp.    She stated around 6/12, she started with what felt like a muscle spasm in her upper back / neck and ever since then has has constant muscle soreness. She rates the pain as a 1-2/10. She also stated she started with nausea around 30min after eating that lasts for about 45 min. Lastly, she stated that when taking her afternoon pills, she always has a metallic taste in her mouth starting around the same time in June as well. Denies chest pain however states she does at times feels a \"muscle soreness\" in her chest. Denies sob, fever, chills, swelling.     Advised pt will send a message to Dr. Henderson to review.     Pt stated if she does not answer the return call, can leave a detailed message.   "

## 2024-07-23 DIAGNOSIS — G47.00 INSOMNIA, UNSPECIFIED TYPE: ICD-10-CM

## 2024-07-23 RX ORDER — ZOLPIDEM TARTRATE 10 MG/1
TABLET ORAL
Qty: 90 TABLET | Refills: 0 | Status: SHIPPED | OUTPATIENT
Start: 2024-07-23

## 2024-08-06 DIAGNOSIS — G47.00 INSOMNIA, UNSPECIFIED TYPE: ICD-10-CM

## 2024-08-08 RX ORDER — ZOLPIDEM TARTRATE 10 MG/1
10 TABLET ORAL
Qty: 90 TABLET | Refills: 0 | Status: SHIPPED | OUTPATIENT
Start: 2024-08-08

## 2024-08-16 ENCOUNTER — TELEPHONE (OUTPATIENT)
Age: 59
End: 2024-08-16

## 2024-08-16 NOTE — TELEPHONE ENCOUNTER
Pt called to let Dr. Henderson know she has been without her rosuvastatin for 2 days.  She had to switch to mail order pharmacy as she does not have transportation, and switched to [employer] insurance as well.  Her shipment of rosuvastatin has not been delivered yet.  She is concerned with how long she can be off of the rosuvastatin before it becomes a concern.  Recommended she contact , and pharmacy to find out any additional information regarding medication shipment.

## 2024-08-16 NOTE — TELEPHONE ENCOUNTER
Attempted to call pt x3. Rings, seems to  but no answer, no VM.   Currently pt's phone does not seem to be accepting calls?  Pt does not have My Chart.   Will close encounter for now, await pt's return call.

## 2024-08-21 DIAGNOSIS — E78.2 MIXED HYPERLIPIDEMIA: ICD-10-CM

## 2024-08-21 RX ORDER — ROSUVASTATIN CALCIUM 40 MG/1
40 TABLET, COATED ORAL DAILY
Qty: 30 TABLET | Refills: 0 | Status: SHIPPED | OUTPATIENT
Start: 2024-08-21

## 2024-08-21 NOTE — TELEPHONE ENCOUNTER
Received another call from pt, asking if medication was sent.  Advised her it is pending, and messages were sent to Dr. Henderson.

## 2024-08-21 NOTE — TELEPHONE ENCOUNTER
Rx not received from mail [facility]x.    Pt requests short fill of Rosuvastatin to local P[facility].     Will send 2nd rx to mail delivery after approved to local.

## 2024-08-21 NOTE — TELEPHONE ENCOUNTER
Marian called stating she still has not received the rosuvastatin and asking for a short supply (14 days) to be sent to [facility]. She has a friend coming today, that will drive her to the pharmacy.  Pt asked to expedite because her friend is coming soon.

## 2024-08-21 NOTE — TELEPHONE ENCOUNTER
Pt called back, she states her ride/transportation is almost to her.  She has a small window of time to be able to  medication from pharmacy.  She is requesting it gets sent to [facility] by noon.

## 2024-09-13 DIAGNOSIS — E78.2 MIXED HYPERLIPIDEMIA: ICD-10-CM

## 2024-09-13 RX ORDER — ROSUVASTATIN CALCIUM 40 MG/1
40 TABLET, COATED ORAL DAILY
Qty: 90 TABLET | Refills: 1 | Status: SHIPPED | OUTPATIENT
Start: 2024-09-13

## 2024-09-27 DIAGNOSIS — F32.A DEPRESSION, UNSPECIFIED DEPRESSION TYPE: ICD-10-CM

## 2024-09-27 RX ORDER — ESCITALOPRAM OXALATE 10 MG/1
10 TABLET ORAL 2 TIMES DAILY
Qty: 180 TABLET | Refills: 0 | Status: SHIPPED | OUTPATIENT
Start: 2024-09-27

## 2024-10-09 DIAGNOSIS — G47.00 INSOMNIA, UNSPECIFIED TYPE: ICD-10-CM

## 2024-10-09 NOTE — TELEPHONE ENCOUNTER
Medication: zolpidem (AMBIEN) 10 mg tablet    Dose/Frequency: Take 1 tablet (10 mg total) by mouth daily at bedtime as needed for sleep    Quantity: 90 tabs    Pharmacy: [facility]/pharmacy #[id number] - [place] - [address] [phone]     Office:   [x] PCP/Provider - Nnamdi Peacock MD   [] Speciality/Provider -     Does the patient have enough for 3 days?   [x] Yes   [] No - Send as HP to POD      Doesn't have enough left to do mail order

## 2024-10-14 ENCOUNTER — OFFICE VISIT (OUTPATIENT)
Dept: FAMILY MEDICINE CLINIC | Facility: HOSPITAL | Age: 59
End: 2024-10-14
Payer: MEDICARE

## 2024-10-14 VITALS
BODY MASS INDEX: 33.83 KG/M2 | HEART RATE: 67 BPM | WEIGHT: 179.2 LBS | TEMPERATURE: 97.6 F | DIASTOLIC BLOOD PRESSURE: 78 MMHG | SYSTOLIC BLOOD PRESSURE: 124 MMHG | HEIGHT: 61 IN

## 2024-10-14 DIAGNOSIS — E11.65 TYPE 2 DIABETES MELLITUS WITH HYPERGLYCEMIA, WITHOUT LONG-TERM CURRENT USE OF INSULIN (HCC): Primary | ICD-10-CM

## 2024-10-14 DIAGNOSIS — E78.2 MIXED HYPERLIPIDEMIA: ICD-10-CM

## 2024-10-14 DIAGNOSIS — R35.1 NOCTURIA: ICD-10-CM

## 2024-10-14 DIAGNOSIS — G47.00 INSOMNIA, UNSPECIFIED TYPE: ICD-10-CM

## 2024-10-14 DIAGNOSIS — I10 PRIMARY HYPERTENSION: ICD-10-CM

## 2024-10-14 DIAGNOSIS — R80.9 MICROALBUMINURIA DUE TO TYPE 2 DIABETES MELLITUS  (HCC): ICD-10-CM

## 2024-10-14 DIAGNOSIS — E11.29 MICROALBUMINURIA DUE TO TYPE 2 DIABETES MELLITUS  (HCC): ICD-10-CM

## 2024-10-14 DIAGNOSIS — I25.810 CORONARY ARTERY DISEASE INVOLVING CORONARY BYPASS GRAFT OF NATIVE HEART WITHOUT ANGINA PECTORIS: ICD-10-CM

## 2024-10-14 LAB
CREAT UR-MCNC: 103.4 MG/DL
MICROALBUMIN UR-MCNC: 26.7 MG/L
MICROALBUMIN/CREAT 24H UR: 26 MG/G CREATININE (ref 0–30)
SL AMB  POCT GLUCOSE, UA: NORMAL
SL AMB LEUKOCYTE ESTERASE,UA: NORMAL
SL AMB POCT BILIRUBIN,UA: NORMAL
SL AMB POCT BLOOD,UA: NORMAL
SL AMB POCT CLARITY,UA: NORMAL
SL AMB POCT COLOR,UA: YELLOW
SL AMB POCT HEMOGLOBIN AIC: 7.8 (ref ?–6.5)
SL AMB POCT KETONES,UA: NORMAL
SL AMB POCT NITRITE,UA: NORMAL
SL AMB POCT PH,UA: 6
SL AMB POCT SPECIFIC GRAVITY,UA: 1.02
SL AMB POCT URINE PROTEIN: NORMAL
SL AMB POCT UROBILINOGEN: NORMAL

## 2024-10-14 PROCEDURE — 82570 ASSAY OF URINE CREATININE: CPT | Performed by: NURSE PRACTITIONER

## 2024-10-14 PROCEDURE — 81002 URINALYSIS NONAUTO W/O SCOPE: CPT | Performed by: NURSE PRACTITIONER

## 2024-10-14 PROCEDURE — 82043 UR ALBUMIN QUANTITATIVE: CPT | Performed by: NURSE PRACTITIONER

## 2024-10-14 PROCEDURE — 83036 HEMOGLOBIN GLYCOSYLATED A1C: CPT | Performed by: NURSE PRACTITIONER

## 2024-10-14 PROCEDURE — 99214 OFFICE O/P EST MOD 30 MIN: CPT | Performed by: NURSE PRACTITIONER

## 2024-10-14 NOTE — PROGRESS NOTES
"Ambulatory Visit  Name: Marian Myers      : 1965      MRN: [id number]  Encounter Provider: JEANCARLOS Anne  Encounter Date: 10/14/2024   Encounter department: [facility] PRIMARY CARE [id number]     Assessment & Plan  Type 2 diabetes mellitus with hyperglycemia, without long-term current use of insulin (HCC)    Lab Results   Component Value Date    HGBA1C 7.8 (A) 10/14/2024     A1C higher than previous on meds as rx'd by endocrine - continue same and encouraged improved lifestyle efforts  Foot and eye exams UTD  Continue statin and ARB daily    Orders:    POCT hemoglobin A1c    Microalbuminuria due to type 2 diabetes mellitus  (HCC)    Lab Results   Component Value Date    HGBA1C 7.8 (A) 10/14/2024     Urine for microalbumin collected     Orders:    Albumin / creatinine urine ratio    Nocturia  R/O UTI with culture as ordered   Discussed option to start OAB med and/or eval further w/bladder US - she declines at this time  Reviewed bladder irritants to limit  Consider need for urology if sx's persist/worsen    Orders:    Urine culture; Future    POCT urine dip    Primary hypertension  Well controlled   Continue same meds as rx'd  Return in 6 months       Mixed hyperlipidemia  Compliant w/statin as rx'd  Advise she update labs as previously ordered       Insomnia, unspecified type  Takes ambien nightly and requests refill for this  Advise this may need to be dc'd if bedtime safety concerns continue - advise she refrain from other substance use with ambien       Coronary artery disease involving coronary bypass graft of native heart without angina pectoris  UTD w/annual cardiology follow up        Update mammo as previously ordered   She continues to decline colon screening        History of Present Illness         Had pain in back of her neck in  and a week later started feeling nausea after eating. This is still bothering her \"like acidy\". Can last for up to an hour in upper abdomen. " "Doesn't believe she's seen a GI specialist.   \"Woke up peeing\" 3 nights since August. She has started drinking less in the evening - last/third incident was Oct 7th.   Left water on one night when she went to bed - this happened on 9/21 and not since. She denies other safety concerns. Occ smokes marijuana and doesn't drink alcohol often (last in April). Takes ambien nightly but doesn't cook or use water afterwards.   Saw endocrine last in May.   Goes to cardiology annually.           History obtained from : patient      Review of Systems   Constitutional: Negative.    Respiratory: Negative.     Cardiovascular: Negative.    Gastrointestinal:  Positive for abdominal pain (upper abdomen).   Genitourinary:         Nocturia - \"wakes peeing\"   Musculoskeletal:  Positive for arthralgias (right shoulder ache) and neck pain.   Psychiatric/Behavioral:  Negative for sleep disturbance (taking ambien nightly).          Objective     /78   Pulse 67   Temp 97.6 °F (36.4 °C)   Ht 5' 1\" (1.549 m)   Wt 81.3 kg (179 lb 3.2 oz)   BMI 33.86 kg/m²       Physical Exam  Vitals reviewed.   Constitutional:       General: She is not in acute distress.     Appearance: Normal appearance. She is obese.   HENT:      Head: Normocephalic.   Eyes:      General: No scleral icterus.  Cardiovascular:      Rate and Rhythm: Normal rate and regular rhythm.   Pulmonary:      Effort: Pulmonary effort is normal. No respiratory distress.      Breath sounds: Normal breath sounds.   Musculoskeletal:      Cervical back: Normal range of motion.      Right lower leg: No edema.      Left lower leg: No edema.   Lymphadenopathy:      Cervical: No cervical adenopathy.   Skin:     General: Skin is warm and dry.   Neurological:      General: No focal deficit present.      Mental Status: She is alert and oriented to person, place, and time.      Cranial Nerves: No cranial nerve deficit.   Psychiatric:         Mood and Affect: Mood normal.         Behavior: " Behavior normal.         Thought Content: Thought content normal.         Judgment: Judgment normal.         Administrative Statements   I have spent a total time of 20 minutes in caring for this patient on the day of the visit/encounter including Diagnostic results, Risks and benefits of tx options, Instructions for management, Patient and family education, Importance of tx compliance, Risk factor reductions, Impressions, Counseling / Coordination of care, Documenting in the medical record, Reviewing / ordering tests, medicine, procedures  , and Obtaining or reviewing history  .

## 2024-10-14 NOTE — ASSESSMENT & PLAN NOTE
Takes ambien nightly and requests refill for this  Advise this may need to be dc'd if bedtime safety concerns continue - advise she refrain from other substance use with ambien

## 2024-10-14 NOTE — ASSESSMENT & PLAN NOTE
Lab Results   Component Value Date    HGBA1C 7.8 (A) 10/14/2024     Urine for microalbumin collected     Orders:    Albumin / creatinine urine ratio

## 2024-10-14 NOTE — ASSESSMENT & PLAN NOTE
Lab Results   Component Value Date    HGBA1C 7.8 (A) 10/14/2024     A1C higher than previous on meds as rx'd by endocrine - continue same and encouraged improved lifestyle efforts  Foot and eye exams UTD  Continue statin and ARB daily    Orders:    POCT hemoglobin A1c

## 2024-10-14 NOTE — ASSESSMENT & PLAN NOTE
LATISHA w/annual cardiology follow up        Update mammo as previously ordered   She continues to decline colon screening

## 2024-10-21 RX ORDER — ZOLPIDEM TARTRATE 10 MG/1
10 TABLET ORAL
Qty: 90 TABLET | Refills: 0 | Status: SHIPPED | OUTPATIENT
Start: 2024-10-21

## 2024-11-06 DIAGNOSIS — F33.9 DEPRESSION, RECURRENT (HCC): ICD-10-CM

## 2024-11-06 DIAGNOSIS — I10 ESSENTIAL HYPERTENSION: ICD-10-CM

## 2024-11-06 RX ORDER — CARVEDILOL 6.25 MG/1
6.25 TABLET ORAL 2 TIMES DAILY WITH MEALS
Qty: 180 TABLET | Refills: 1 | Status: SHIPPED | OUTPATIENT
Start: 2024-11-06

## 2024-11-07 RX ORDER — BUPROPION HYDROCHLORIDE 150 MG/1
150 TABLET ORAL EVERY MORNING
Qty: 90 TABLET | Refills: 1 | Status: SHIPPED | OUTPATIENT
Start: 2024-11-07

## 2024-12-05 ENCOUNTER — TELEPHONE (OUTPATIENT)
Dept: ENDOCRINOLOGY | Facility: HOSPITAL | Age: 59
End: 2024-12-05

## 2024-12-05 DIAGNOSIS — E11.65 TYPE 2 DIABETES MELLITUS WITH HYPERGLYCEMIA, WITHOUT LONG-TERM CURRENT USE OF INSULIN (HCC): ICD-10-CM

## 2024-12-05 RX ORDER — GLIPIZIDE 10 MG/1
10 TABLET, FILM COATED, EXTENDED RELEASE ORAL 2 TIMES DAILY
Qty: 180 TABLET | Refills: 0 | Status: SHIPPED | OUTPATIENT
Start: 2024-12-05

## 2024-12-05 NOTE — TELEPHONE ENCOUNTER
Received request from [facility] for Glipizide refill. Please reach out to patient to schedule follow up.

## 2024-12-13 NOTE — PATIENT INSTRUCTIONS
Obesity   AMBULATORY CARE:   Obesity  means your body mass index (BMI) is greater than 30  Your healthcare provider will use your age, height, and weight to measure your BMI  The risks of obesity include  many health problems, including injuries or physical disability  • Diabetes (high blood sugar level)    • High blood pressure or high cholesterol    • Heart disease    • Stroke    • Gallbladder or liver disease    • Cancer of the colon, breast, prostate, liver, or kidney    • Sleep apnea    • Arthritis or gout    Screening  is done to check for health conditions before you have signs or symptoms  If you are 28to 79years old, your blood sugar level may be checked every 3 years for signs of prediabetes or diabetes  Your healthcare provider will check your blood pressure at each visit  High blood pressure can lead to a stroke or other problems  Your provider may check for signs of heart disease, cancer, or other health problems  Seek care immediately if:   • You have a severe headache, confusion, or difficulty speaking  • You have weakness on one side of your body  • You have chest pain, sweating, or shortness of breath  Call your doctor if:   • You have symptoms of gallbladder or liver disease, such as pain in your upper abdomen  • You have knee or hip pain and discomfort while walking  • You have symptoms of diabetes, such as intense hunger and thirst, and frequent urination  • You have symptoms of sleep apnea, such as snoring or daytime sleepiness  • You have questions or concerns about your condition or care  Treatment for obesity  focuses on helping you lose weight to improve your health  Even a small decrease in BMI can reduce the risk for many health problems  Your healthcare provider will help you set a weight-loss goal   • Lifestyle changes  are the first step in treating obesity  These include making healthy food choices and getting regular physical activity   Your healthcare follows with vascular surgery,  history of bypass grafting to lower extremities  history of EVAR due to AAA   provider may suggest a weight-loss program that involves coaching, education, and therapy  • Medicine  may help you lose weight when it is used with a healthy foods and physical activity  • Surgery  can help you lose weight if you have obesity along with other health problems  Several types of weight-loss surgery are available  Ask your healthcare provider for more information  Tips for safe weight loss:   • Set small, realistic goals  An example of a small goal is to walk for 20 minutes 5 days a week  Anther goal is to lose 5% of your body weight  • Ask for support  Tell friends, family members, and coworkers about your goals  Ask someone to lose weight with you  You may also want to join a weight-loss support group  • Identify foods or triggers that may cause you to overeat  Remove tempting high-calorie foods from your home and workplace  Place a bowl of fresh fruit on your kitchen counter  If stress causes you to eat, find other ways to cope with stress  A counselor or therapist may be able to help you  • Track your daily calories and activity  Write down what you eat and drink  Also write down how many minutes of physical activity you do each day  • Track your weekly weight  Weigh yourself in the morning, before you eat or drink anything but after you use the bathroom  Use the same scale, in the same place, and in similar clothing each time  Only weigh yourself 1 to 2 times each week, or as directed  You may become discouraged if you weigh yourself every day  Eating changes: You will need to eat 500 to 1,000 fewer calories each day than you currently eat to lose 1 to 2 pounds a week  The following changes will help you cut calories:  • Eat smaller portions  Use small plates, no larger than 9 inches in diameter  Fill your plate half full of fruits and vegetables  Measure your food using measuring cups until you know what a serving size looks like           • Eat 3 meals and 1 or 2 snacks each day  Plan your meals in advance  Chris Azul and eat at home most of the time  Eat slowly  Do not skip meals  Skipping meals can lead to overeating later in the day  This can make it harder for you to lose weight  Talk with a dietitian to help you make a meal plan and schedule that is right for you  • Eat fruits and vegetables at every meal   They are low in calories and high in fiber, which makes you feel full  Do not add butter, margarine, or cream sauce to vegetables  Use herbs to season steamed vegetables  • Eat less fat and fewer fried foods  Eat more baked or grilled chicken and fish  These protein sources are lower in calories and fat than red meat  Limit fast food  Dress your salads with olive oil and vinegar instead of bottled dressing  • Limit the amount of sugar you eat  Do not drink sugary beverages  Limit alcohol  Activity changes:  Physical activity is good for your body in many ways  It helps you burn calories and build strong muscles  It decreases stress and depression, and improves your mood  It can also help you sleep better  Talk to your healthcare provider before you begin an exercise program   • Exercise for at least 30 minutes 5 days a week  Start slowly  Set aside time each day for physical activity that you enjoy and that is convenient for you  It is best to do both weight training and an activity that increases your heart rate, such as walking, bicycling, or swimming  • Find ways to be more active  Do yard work and housecleaning  Walk up the stairs instead of using elevators  Spend your leisure time going to events that require walking, such as outdoor festivals or fairs  This extra physical activity can help you lose weight and keep it off  Follow up with your doctor as directed: You may need to meet with a dietitian  Write down your questions so you remember to ask them during your visits    © Copyright [employer] 2022 Information is for End User's use only and may not be sold, redistributed or otherwise used for commercial purposes  The above information is an  only  It is not intended as medical advice for individual conditions or treatments  Talk to your doctor, nurse or pharmacist before following any medical regimen to see if it is safe and effective for you  Type 2 Diabetes Management for Adults   AMBULATORY CARE:   Type 2 diabetes  is a disease that affects how your body uses glucose (sugar)  Either your body cannot make enough insulin, or it cannot use the insulin correctly  It is important to keep diabetes controlled to prevent damage to your heart, blood vessels, and other organs  Management will help you feel well and enjoy your daily activities  Your diabetes care team providers can help you make a plan to fit diabetes care into your schedule  Your plan can change over time to fit your needs and your family's needs  Have someone call your local emergency number (911 in the [address] [facility]) if:   • You cannot be woken  • You have signs of diabetic ketoacidosis:     ? confusion, fatigue    ? vomiting    ? rapid heartbeat    ? fruity smelling breath    ? extreme thirst    ? dry mouth and skin    • You have any of the following signs of a heart attack:      ? Squeezing, pressure, or pain in your chest    ? You may  also have any of the following:     - Discomfort or pain in your back, neck, jaw, stomach, or arm    - Shortness of breath    - Nausea or vomiting    - Lightheadedness or a sudden cold sweat    • You have any of the following signs of a stroke:      ? Numbness or drooping on one side of your face     ? Weakness in an arm or leg    ? Confusion or difficulty speaking    ? Dizziness, a severe headache, or vision loss    Call your doctor or diabetes care team provider if:   • You have a sore or wound that will not heal     • You have a change in the amount you urinate      • Your blood sugar levels are higher than your target goals     • You often have lower blood sugar levels than your target goals  • Your skin is red, dry, warm, or swollen  • You have trouble coping with diabetes, or you feel anxious or depressed  • You have questions or concerns about your condition or care  What you need to know about high blood sugar levels:  High blood sugar levels may not cause any symptoms  You may feel more thirsty or urinate more often than usual  Over time, high blood sugar levels can damage your nerves, blood vessels, tissues, and organs  The following can increase your blood sugar levels:  • Large meals or large amounts of carbohydrates at one time    • Less physical activity    • Stress    • Illness    • A lower dose of diabetes medicine or insulin, or a late dose    What you need to know about low blood sugar levels:  Symptoms include feeling shaky, dizzy, irritable, or confused  You can prevent symptoms by keeping your blood sugar levels from going too low  • Treat a low blood sugar level right away:      ? Drink 4 ounces of juice or have 1 tube of glucose gel  ? Check your blood sugar level again 10 to 15 minutes later  ? When the level goes back to normal, eat a meal or snack to prevent another decrease  • Keep glucose gel, raisins, or hard candy with you at all times to treat a low blood sugar level  • Your blood sugar level can get too low if you take diabetes medicine or insulin and do not eat enough food  • If you use insulin, check your blood sugar level before you exercise  ? If your blood sugar level is below 100 mg/dL, eat 4 crackers or 2 ounces of raisins, or drink 4 ounces of juice  ? Check your level every 30 minutes if you exercise longer than 1 hour  ? You may need a snack during or after exercise  What you can do to manage your blood sugar levels:   • Check your blood sugar levels as directed and as needed  Several items are available to use to check your levels   You may need to check by testing a drop of blood in a glucose monitor  You may instead be given a continuous glucose monitoring (CGM) device  The device is worn at all times  The CGM checks your blood sugar level every 5 minutes  It sends results to an electronic device such as a smart phone  A CGM can be used with or without an insulin pump  You and your diabetes care team providers will decide on the best method for you  The goal for blood sugar levels before meals  is between 80 and 130 mg/dL and 2 hours after eating  is lower than 180 mg/dL  • Make healthy food choices  Work with a dietitian to develop a meal plan that works for you and your schedule  A dietitian can help you learn how to eat the right amount of carbohydrates during your meals and snacks  Carbohydrates can raise your blood sugar level if you eat too many at one time  Examples of foods that contain carbohydrates are breads, cereals, rice, pasta, and sweets  • Eat high-fiber foods as directed  Fiber helps improve blood sugar levels  Fiber also lowers your risk for heart disease and other problems diabetes can cause  Examples of high-fiber foods include vegetables, whole-grain bread, and beans such as devine beans  Your dietitian can tell you how much fiber to have each day  • Get regular physical activity  Physical activity can help you get to your target blood sugar level goal and manage your weight  Get at least 150 minutes of moderate to vigorous aerobic physical activity each week  Do not miss more than 2 days in a row  Do not sit longer than 30 minutes at a time  Your healthcare provider can help you create an activity plan  The plan can include the best activities for you and can help you build your strength and endurance  • Maintain a healthy weight  Ask your team what a healthy weight is for you  A healthy weight can help you control diabetes and prevent heart disease   Ask your team to help you create a weight loss plan, if needed  Weight loss can help make a difference in managing diabetes  Your team will help you set a weight-loss goal, such as 10 to 15 pounds, or 5% of your extra weight  Together you and your team can set manageable weight loss goals  • Take your diabetes medicine or insulin as directed  You may need diabetes medicine, insulin, or both to help control your blood sugar levels  Your healthcare provider will teach you how and when to take your diabetes medicine or insulin  You will also be taught about side effects oral diabetes medicine can cause  Insulin may be injected or given through a pump or pen  You and your providers will decide on the best method for you:    ? An insulin pump  is an implanted device that gives your insulin 24 hours a day  An insulin pump prevents the need for multiple insulin injections in a day  ? An insulin pen  is a device prefilled with the right amount of insulin  ? You and your family members will be taught how to draw up and give insulin  if this is the best method for you  Your providers will also teach you how to dispose of needles and syringes  ? You will learn how much insulin you need  and when to give it  You will be taught when not to give insulin  You will also be taught what to do if your blood sugar level drops too low  This may happen if you take insulin and do not eat the right amount of carbohydrates  More ways to manage type 2 diabetes:   • Wear medical alert identification  Wear medical alert jewelry or carry a card that says you have diabetes  Ask your provider where to get these items  • Do not smoke  Nicotine and other chemicals in cigarettes and cigars can cause lung and blood vessel damage  It also makes it more difficult to manage your diabetes  Ask your provider for information if you currently smoke and need help to quit  Do not use e-cigarettes or smokeless tobacco in place of cigarettes or to help you quit   They still contain nicotine  • Check your feet each day for cuts, scratches, calluses, or other wounds  Look for redness and swelling, and feel for warmth  Wear shoes that fit well  Check your shoes for rocks or other objects that can hurt your feet  Do not walk barefoot or wear shoes without socks  Wear cotton socks to help keep your feet dry  • Ask about vaccines you may need  You have a higher risk for serious illness if you get the flu, pneumonia, COVID-19, or hepatitis  Ask your provider if you should get vaccines to prevent these or other diseases, and when to get the vaccines  • Talk to your provider if you become stressed about diabetes care  Sometimes being able to fit diabetes care into your life can cause increased stress  The stress can cause you not to take care of yourself properly  Your care team providers can help by offering tips about self-care  Your providers may suggest you talk to a mental health provider who can listen and offer help with self-care issues  • Have your A1c checked as directed  Your provider may check your A1c every 3 months, or 2 times each year if your diabetes is controlled  An A1c test shows the average amount of sugar in your blood over the past 2 to 3 months  Your provider will tell you what your A1c level should be  • Have screening tests as directed  Your provider may recommend screening for complications of diabetes and other conditions that may develop  Some screenings may begin right away and some may happen within the first 5 years of diagnosis:    ? Examples of diabetes complications  include kidney problems, high cholesterol, high blood pressure, blood vessel problems, eye problems, and sleep apnea  ? You may be screened for a low vitamin B level  if you take oral diabetes medicine for a long time  ? Women of childbearing years may be screened  for polycystic ovarian syndrome (PCOS)      Follow up with your doctor or diabetes care team providers as directed: You may need to have blood tests done before your follow-up visit  The test results will show if changes need to be made in your treatment or self-care  Talk to your provider if you cannot afford your medicine  Write down your questions so you remember to ask them during your visits  © Copyright [employer] 2022 Information is for End User's use only and may not be sold, redistributed or otherwise used for commercial purposes  The above information is an  only  It is not intended as medical advice for individual conditions or treatments  Talk to your doctor, nurse or pharmacist before following any medical regimen to see if it is safe and effective for you

## 2024-12-27 ENCOUNTER — OFFICE VISIT (OUTPATIENT)
Dept: ENDOCRINOLOGY | Facility: HOSPITAL | Age: 59
End: 2024-12-27
Payer: MEDICARE

## 2024-12-27 VITALS
HEIGHT: 61 IN | OXYGEN SATURATION: 97 % | HEART RATE: 66 BPM | SYSTOLIC BLOOD PRESSURE: 120 MMHG | WEIGHT: 183 LBS | DIASTOLIC BLOOD PRESSURE: 70 MMHG | BODY MASS INDEX: 34.55 KG/M2

## 2024-12-27 DIAGNOSIS — I10 PRIMARY HYPERTENSION: ICD-10-CM

## 2024-12-27 DIAGNOSIS — E11.65 TYPE 2 DIABETES MELLITUS WITH HYPERGLYCEMIA, WITHOUT LONG-TERM CURRENT USE OF INSULIN (HCC): Primary | ICD-10-CM

## 2024-12-27 DIAGNOSIS — E78.2 MIXED HYPERLIPIDEMIA: ICD-10-CM

## 2024-12-27 DIAGNOSIS — E11.29 MICROALBUMINURIA DUE TO TYPE 2 DIABETES MELLITUS  (HCC): ICD-10-CM

## 2024-12-27 DIAGNOSIS — R80.9 MICROALBUMINURIA DUE TO TYPE 2 DIABETES MELLITUS  (HCC): ICD-10-CM

## 2024-12-27 PROCEDURE — G2211 COMPLEX E/M VISIT ADD ON: HCPCS | Performed by: INTERNAL MEDICINE

## 2024-12-27 PROCEDURE — 99214 OFFICE O/P EST MOD 30 MIN: CPT | Performed by: INTERNAL MEDICINE

## 2024-12-27 RX ORDER — DAPAGLIFLOZIN 10 MG/1
10 TABLET, FILM COATED ORAL DAILY
Qty: 90 TABLET | Refills: 3 | Status: SHIPPED | OUTPATIENT
Start: 2024-12-27

## 2024-12-27 NOTE — PATIENT INSTRUCTIONS
Hgba1c is 7.8%. this is a bit higher.     Increase the farxiga to 10 mg daily.     Contineu the same glipizide 10 mg twice a day.     Cotninue to work on diabetic diet and exercsie.     Continue lotiuon daily on foot rash, can use light coating hydrocortisone cream 0.5-1 % once daily.     Follow up in 5 months with blood work.

## 2024-12-27 NOTE — PROGRESS NOTES
12/27/2024    Assessment & Plan      Diagnoses and all orders for this visit:    Type 2 diabetes mellitus with hyperglycemia, without long-term current use of insulin (HCC)  -     Comprehensive metabolic panel; Future  -     CBC and differential; Future  -     Hemoglobin A1C; Future  -     TSH, 3rd generation; Future  -     Lipid Panel with Direct LDL reflex; Future  -     dapagliflozin (Farxiga) 10 MG tablet; Take 1 tablet (10 mg total) by mouth daily    Microalbuminuria due to type 2 diabetes mellitus  (HCC)  -     Comprehensive metabolic panel; Future  -     CBC and differential; Future  -     Hemoglobin A1C; Future  -     TSH, 3rd generation; Future  -     Lipid Panel with Direct LDL reflex; Future    Mixed hyperlipidemia  -     Comprehensive metabolic panel; Future  -     CBC and differential; Future  -     Hemoglobin A1C; Future  -     TSH, 3rd generation; Future  -     Lipid Panel with Direct LDL reflex; Future    Primary hypertension  -     Comprehensive metabolic panel; Future  -     CBC and differential; Future  -     Hemoglobin A1C; Future  -     TSH, 3rd generation; Future  -     Lipid Panel with Direct LDL reflex; Future          Assessment & Plan  1. Type 2 diabetes mellitus.  Her last hemoglobin A1c in October was 7.8%, which is higher than desired but an improvement from the previous year. The urine test showed no diabetes effects, indicating that losartan and Farxiga are effective. She is advised to continue her diabetic diet and exercise regimen. The dosage of Farxiga will be increased to 10 mg, and she will continue taking glipizide XL 10 mg twice a day. She is advised to avoid high-calorie and high-carbohydrate foods and drinks, but may consume 4 ounces of juice once a week. She is also advised to use filtered water or flavored water with minimal calories. Blood work has been ordered for future monitoring.    2. Hypertension.  She is currently taking carvedilol 6.25 mg twice a day and losartan 50  mg daily for blood pressure management and kidney protection. She should continue with her current medication regimen.    3. Hyperlipidemia.  She is currently taking rosuvastatin 40 mg daily for cholesterol management. She should continue with her current medication regimen.    4. Eczematous rash.  An eczematous rash was noted on the dorsum of her left ankle. She is advised to apply a light coating of hydrocortisone cream 0.5% or 1% once daily and to keep the area moisturized with lotion.    5.  Microalbuminuria.  Urine microalbumin to creatinine ratio has normalized.  She will continue the current dose of losartan and Farxiga.    Follow-up  The patient will follow up in 5 months with preceding hemoglobin A1c, CMP, CBC, TSH, and lipid panel.        CC: Diabetes type II, lipid, blood pressure follow-up    History of Present Illness    HPI: Marian Myers is a 59-year-old female with type 2 diabetes with microalbuminuria diagnosed within the last 10 years, hypertension, and hyperlipidemia, who presents for a follow-up visit.    She is currently on Farxiga 5 mg daily and glipizide XL 10 mg twice daily. She has made dietary modifications, including eliminating ice cream and other high-calorie foods. Her fluid intake primarily consists of water, unsweetened iced tea, and milk, with a preference for chocolate milk. She expresses fatigue with her current beverage options and desires more variety. She reports increased urination, particularly at night, and persistent thirst.     She experiences occasional blurry vision and has not had a recent ophthalmological evaluation due to lack of insurance. She reports intermittent numbness and tingling in her feet upon waking, which resolves by the time she reaches the bathroom. She is unable to cross her legs due to resultant numbness and tingling. She reports no foot wounds but has noticed a new, itchy rash on her foot. She maintains an active lifestyle, walking daily.       She  reports no chest pain or shortness of breath but does experience intermittent muscle aches, predominantly on the right side. She is on carvedilol 6.25 mg twice daily and losartan 50 mg daily for blood pressure management and kidney protection.    She has a history of eczema and has been applying an unspecified lotion to her foot rash.    She is on rosuvastatin 40 mg daily for cholesterol management.    Blood Sugar/Glucometer/Pump/CGM review: She does not monitor her blood glucose levels at home. She reports no episodes of hypoglycemia.      Historical Information   Past Medical History:   Diagnosis Date    Circulation problem     Coronary artery disease involving coronary bypass graft of native heart without angina pectoris 11/12/2019    Diabetes ([facility])     Dyspnea on exertion 11/12/2019    Glaucoma     Hearing problem     Heart attack ([facility])     X 3    Heart valve problem     High blood pressure     High cholesterol     Hypoxia 07/29/2022    Kidney stone 2022    left    Moderate episode of recurrent major depressive disorder ([facility]) 03/23/2023    Obesity, morbid ([facility]) 01/25/2021    Open injury of heart     Pyelitis 07/27/2022    Sepsis ([facility]) 07/27/2022    Sleep apnea      Past Surgical History:   Procedure Laterality Date    CORONARY ARTERY BYPASS GRAFT  2011    CORONARY STENT PLACEMENT  2010 January    CORONARY STENT PLACEMENT  2015 June    IR NEPHROSTOMY TUBE CHECK/CHANGE/REPOSITION/REINSERTION/UPSIZE  08/18/2022    IR NEPHROSTOMY TUBE PLACEMENT  07/27/2022    TONSILECTOMY AND ADNOIDECTOMY      VAGINAL DELIVERY  1987    VAGINAL DELIVERY  1989     Social History   Social History     Substance and Sexual Activity   Alcohol Use Yes    Comment: special events     Social History     Substance and Sexual Activity   Drug Use Yes    Types: Marijuana    Comment: daily prn     Social History     Tobacco Use   Smoking Status Former    Current packs/day: 0.00    Types: Cigarettes    Quit date: 2011    Years since quitting:  "13.9   Smokeless Tobacco Never     Family History:   Family History   Problem Relation Age of Onset    Hypertension Father     Coronary artery disease Father     Sudden death Father         cardiac 2014    No Known Problems Son     No Known Problems Daughter        Meds/Allergies   Current Outpatient Medications   Medication Sig Dispense Refill    aspirin (ECOTRIN LOW STRENGTH) 81 mg EC tablet Take 81 mg by mouth daily      buPROPion (WELLBUTRIN XL) 150 mg 24 hr tablet TAKE 1 TABLET BY MOUTH DAILY IN  THE MORNING 90 tablet 1    carvedilol (COREG) 6.25 mg tablet TAKE 1 TABLET BY MOUTH TWICE  DAILY WITH MEALS 180 tablet 1    dapagliflozin (Farxiga) 10 MG tablet Take 1 tablet (10 mg total) by mouth daily 90 tablet 3    escitalopram (LEXAPRO) 10 mg tablet TAKE 1 TABLET BY MOUTH TWICE  DAILY 180 tablet 0    glipiZIDE (GLUCOTROL XL) 10 mg 24 hr tablet Take 1 tablet (10 mg total) by mouth 2 (two) times a day 180 tablet 0    glucose blood (OneTouch Ultra) test strip Use 1 daily 90 strip 2    Lancets 28G MISC Use 1 Box 3 (three) times a day One Touch Ultra Mini lancets 3 each 2    losartan (COZAAR) 50 mg tablet TAKE 1 TABLET BY MOUTH EVERY DAY 90 tablet 1    rosuvastatin (CRESTOR) 40 MG tablet TAKE 1 TABLET BY MOUTH EVERY DAY 90 tablet 1    zolpidem (AMBIEN) 10 mg tablet Take 1 tablet (10 mg total) by mouth daily at bedtime as needed for sleep 90 tablet 0     No current facility-administered medications for this visit.     Allergies   Allergen Reactions    Metformin Diarrhea    Penicillins Hives       Objective   Vitals: Blood pressure 120/70, pulse 66, height 5' 1\" (1.549 m), weight 83 kg (183 lb), SpO2 97%.  Invasive Devices       None                   Physical Exam    Thyroid is normal in size, no palpable nodules.  Lungs are clear to auscultation.  Heart has a regular rate and rhythm.  No lower extremity edema, no ulcerations in the feet, callus on the medial portion of the first toes bilaterally. The dorsum of the left " ankle shows an eczematous rash that appears to have been scratched from itchiness. Dorsalis pedis and posterior tibialis pulses are 2+. Vibration sensation are slightly diminished to the first toe DIP joint bilaterally. Monofilament sensation is intact to both feet.  Patient's shoes and socks removed.    Right Foot/Ankle   Right Foot Inspection  Skin Exam: skin normal, skin intact, callus and callus. No dry skin, no warmth, no erythema, no maceration, no abnormal color, no pre-ulcer and no ulcer.     Toe Exam: No swelling and  no right toe deformity    Sensory   Vibration: diminished  Monofilament testing: intact    Vascular  Capillary refills: < 3 seconds  The right DP pulse is 2+. The right PT pulse is 2+.     Left Foot/Ankle  Left Foot Inspection  Skin Exam: skin normal, skin intact and callus. No dry skin, no warmth, no erythema, no maceration, normal color, no pre-ulcer and no ulcer.     Toe Exam: No swelling and no left toe deformity.     Sensory   Vibration: diminished  Monofilament testing: intact    Vascular  Capillary refills: < 3 seconds  The left DP pulse is 2+. The left PT pulse is 2+.     Assign Risk Category  No deformity present  Loss of protective sensation  No weak pulses  Risk: 1        The history was obtained from the review of the chart and from the patient.    Lab Results:    Most recent Alc is  Lab Results   Component Value Date    HGBA1C 7.8 (A) 10/14/2024           Blood work performed on 10/14/2024 showed a urine microalbumin to creatinine ratio of 26.    Lab Results   Component Value Date    CREATININE 0.91 12/05/2023    CREATININE 0.91 09/12/2023    CREATININE 0.93 06/19/2023    BUN 14 12/05/2023    K 3.9 12/05/2023     12/05/2023    CO2 28 12/05/2023     eGFR   Date Value Ref Range Status   12/05/2023 69 ml/min/1.73sq m Final         Lab Results   Component Value Date    HDL 34 (L) 12/05/2023    TRIG 152 (H) 12/05/2023       Lab Results   Component Value Date    ALT 19 12/05/2023     AST 23 12/05/2023    ALKPHOS 84 12/05/2023                 Future Appointments   Date Time Provider Department Center   4/3/2025 11:20 AM [facility]LOS [facility]   6/4/2025 10:20 AM Susan Freeman MD [facility]

## 2025-01-30 ENCOUNTER — TELEPHONE (OUTPATIENT)
Dept: FAMILY MEDICINE CLINIC | Facility: HOSPITAL | Age: 60
End: 2025-01-30

## 2025-02-03 DIAGNOSIS — I10 ESSENTIAL HYPERTENSION: ICD-10-CM

## 2025-02-04 RX ORDER — LOSARTAN POTASSIUM 50 MG/1
50 TABLET ORAL DAILY
Qty: 90 TABLET | Refills: 0 | Status: SHIPPED | OUTPATIENT
Start: 2025-02-04

## 2025-02-13 ENCOUNTER — TELEPHONE (OUTPATIENT)
Dept: FAMILY MEDICINE CLINIC | Facility: HOSPITAL | Age: 60
End: 2025-02-13

## 2025-02-13 DIAGNOSIS — F33.9 RECURRENT DEPRESSION (HCC): Primary | ICD-10-CM

## 2025-02-13 DIAGNOSIS — F32.A DEPRESSION, UNSPECIFIED DEPRESSION TYPE: ICD-10-CM

## 2025-02-13 RX ORDER — ESCITALOPRAM OXALATE 10 MG/1
10 TABLET ORAL 2 TIMES DAILY
Qty: 180 TABLET | Refills: 1 | Status: SHIPPED | OUTPATIENT
Start: 2025-02-13 | End: 2025-02-13 | Stop reason: SDUPTHER

## 2025-02-13 RX ORDER — ESCITALOPRAM OXALATE 10 MG/1
10 TABLET ORAL 2 TIMES DAILY
Qty: 180 TABLET | Refills: 0 | Status: SHIPPED | OUTPATIENT
Start: 2025-02-13 | End: 2025-02-13 | Stop reason: SDUPTHER

## 2025-02-13 RX ORDER — ESCITALOPRAM OXALATE 10 MG/1
10 TABLET ORAL 2 TIMES DAILY
Qty: 180 TABLET | Refills: 1 | Status: SHIPPED | OUTPATIENT
Start: 2025-02-13

## 2025-02-13 NOTE — TELEPHONE ENCOUNTER
Escitalopram has failed transmission 3 times. It will need to be called/faxed in to [facility]   Phone: [phone]  Fax: [phone]

## 2025-02-25 DIAGNOSIS — E78.2 MIXED HYPERLIPIDEMIA: ICD-10-CM

## 2025-02-26 RX ORDER — ROSUVASTATIN CALCIUM 40 MG/1
40 TABLET, COATED ORAL DAILY
Qty: 90 TABLET | Refills: 0 | Status: SHIPPED | OUTPATIENT
Start: 2025-02-26

## 2025-03-06 DIAGNOSIS — G47.00 INSOMNIA, UNSPECIFIED TYPE: ICD-10-CM

## 2025-03-06 NOTE — TELEPHONE ENCOUNTER
Requested medication(s) are due for refill today: Yes  Patient has already received a courtesy refill: No  Other reason request has been forwarded to provider: PCP left for the day/weekend, are you able to refill?

## 2025-03-10 RX ORDER — ZOLPIDEM TARTRATE 10 MG/1
10 TABLET ORAL
Qty: 90 TABLET | Refills: 0 | OUTPATIENT
Start: 2025-03-10

## 2025-03-13 ENCOUNTER — RA CDI HCC (OUTPATIENT)
Dept: OTHER | Facility: HOSPITAL | Age: 60
End: 2025-03-13

## 2025-03-13 NOTE — PROGRESS NOTES
It is noted in the patient record that the patient has F33.9 depression, recurrent, which no longer risk adjusts under the V28 model.    Can the depression be further specified as:     F33.0 major depressive disorder, recurrent, mild  OR   F33.1 major depressive disorder, recurrent, moderate  OR   F33.2 major depressive disorder, recurrent, severe without psychotic features OR   F33.41 major depressive disorder, recurrent, in partial remission OR   F33.42 major depressive disorder, recurrent, in full remission  HCC coding opportunities          Chart Reviewed number of suggestions sent to Provider: 1     Patients Insurance     Medicare Insurance: Medicare

## 2025-03-14 DIAGNOSIS — G47.00 INSOMNIA, UNSPECIFIED TYPE: ICD-10-CM

## 2025-03-14 RX ORDER — ZOLPIDEM TARTRATE 10 MG/1
10 TABLET ORAL
Qty: 90 TABLET | Refills: 0 | OUTPATIENT
Start: 2025-03-14

## 2025-03-14 NOTE — TELEPHONE ENCOUNTER
Patient called for refill to be sent to the pharmacy. Patient made aware that it is too soon to refill the script.   Patient does not want the script to get sent to her mail order, patient would like the zolpidem at the local pharmacy. Patient will call when it gets closer to the end of her script.

## 2025-03-14 NOTE — TELEPHONE ENCOUNTER
Requested medication(s) are due for refill today: No  Patient has already received a courtesy refill: No  Other reason request has been forwarded to provider: I know you said she is requesting too early but I can't decline scripts.

## 2025-03-19 DIAGNOSIS — E11.65 TYPE 2 DIABETES MELLITUS WITH HYPERGLYCEMIA, WITHOUT LONG-TERM CURRENT USE OF INSULIN (HCC): ICD-10-CM

## 2025-03-19 RX ORDER — GLIPIZIDE 10 MG/1
10 TABLET, FILM COATED, EXTENDED RELEASE ORAL 2 TIMES DAILY
Qty: 180 TABLET | Refills: 1 | Status: SHIPPED | OUTPATIENT
Start: 2025-03-19

## 2025-03-20 DIAGNOSIS — G47.00 INSOMNIA, UNSPECIFIED TYPE: ICD-10-CM

## 2025-03-20 RX ORDER — ZOLPIDEM TARTRATE 10 MG/1
10 TABLET ORAL
Qty: 90 TABLET | Refills: 0 | Status: SHIPPED | OUTPATIENT
Start: 2025-03-20

## 2025-04-23 DIAGNOSIS — I10 ESSENTIAL HYPERTENSION: ICD-10-CM

## 2025-04-23 DIAGNOSIS — F33.9 DEPRESSION, RECURRENT (HCC): ICD-10-CM

## 2025-04-23 RX ORDER — CARVEDILOL 6.25 MG/1
6.25 TABLET ORAL 2 TIMES DAILY WITH MEALS
Qty: 180 TABLET | Refills: 3 | Status: SHIPPED | OUTPATIENT
Start: 2025-04-23 | End: 2025-05-01 | Stop reason: SDUPTHER

## 2025-04-24 ENCOUNTER — TELEPHONE (OUTPATIENT)
Age: 60
End: 2025-04-24

## 2025-04-24 RX ORDER — LOSARTAN POTASSIUM 50 MG/1
50 TABLET ORAL DAILY
Qty: 90 TABLET | Refills: 0 | Status: SHIPPED | OUTPATIENT
Start: 2025-04-24

## 2025-04-24 RX ORDER — BUPROPION HYDROCHLORIDE 150 MG/1
150 TABLET ORAL EVERY MORNING
Qty: 90 TABLET | Refills: 0 | Status: SHIPPED | OUTPATIENT
Start: 2025-04-24

## 2025-04-24 NOTE — TELEPHONE ENCOUNTER
Called, spoke to pt. Labs were ordered by Kiya in Dec and would be sufficient for Cardio. Pt will have drawn before visit.

## 2025-04-24 NOTE — TELEPHONE ENCOUNTER
Caller: Patient     Doctor: Dr. Fabricio Henderson     Reason for call: Pt is scheduled to see Dr. Henderson on 5/1/25 & would like if any lab work is needed prior to appt and if so pt would like it placed in chart. Please call pt and advise.    Call back#: [phone]

## 2025-04-28 ENCOUNTER — APPOINTMENT (OUTPATIENT)
Dept: LAB | Facility: HOSPITAL | Age: 60
End: 2025-04-28
Payer: MEDICARE

## 2025-04-28 DIAGNOSIS — R80.9 MICROALBUMINURIA DUE TO TYPE 2 DIABETES MELLITUS  (HCC): ICD-10-CM

## 2025-04-28 DIAGNOSIS — E78.2 MIXED HYPERLIPIDEMIA: ICD-10-CM

## 2025-04-28 DIAGNOSIS — I10 PRIMARY HYPERTENSION: ICD-10-CM

## 2025-04-28 DIAGNOSIS — E11.29 MICROALBUMINURIA DUE TO TYPE 2 DIABETES MELLITUS  (HCC): ICD-10-CM

## 2025-04-28 DIAGNOSIS — E11.65 TYPE 2 DIABETES MELLITUS WITH HYPERGLYCEMIA, WITHOUT LONG-TERM CURRENT USE OF INSULIN (HCC): ICD-10-CM

## 2025-04-28 DIAGNOSIS — R35.1 NOCTURIA: ICD-10-CM

## 2025-04-28 LAB
ALBUMIN SERPL BCG-MCNC: 4.3 G/DL (ref 3.5–5)
ALP SERPL-CCNC: 97 U/L (ref 34–104)
ALT SERPL W P-5'-P-CCNC: 18 U/L (ref 7–52)
ANION GAP SERPL CALCULATED.3IONS-SCNC: 9 MMOL/L (ref 4–13)
AST SERPL W P-5'-P-CCNC: 14 U/L (ref 13–39)
BASOPHILS # BLD AUTO: 0.03 THOUSANDS/ÂΜL (ref 0–0.1)
BASOPHILS NFR BLD AUTO: 0 % (ref 0–1)
BILIRUB SERPL-MCNC: 0.47 MG/DL (ref 0.2–1)
BUN SERPL-MCNC: 18 MG/DL (ref 5–25)
CALCIUM SERPL-MCNC: 9.2 MG/DL (ref 8.4–10.2)
CHLORIDE SERPL-SCNC: 104 MMOL/L (ref 96–108)
CHOLEST SERPL-MCNC: 156 MG/DL (ref ?–200)
CO2 SERPL-SCNC: 28 MMOL/L (ref 21–32)
CREAT SERPL-MCNC: 0.9 MG/DL (ref 0.6–1.3)
EOSINOPHIL # BLD AUTO: 0.15 THOUSAND/ÂΜL (ref 0–0.61)
EOSINOPHIL NFR BLD AUTO: 2 % (ref 0–6)
ERYTHROCYTE [DISTWIDTH] IN BLOOD BY AUTOMATED COUNT: 12.7 % (ref 11.6–15.1)
EST. AVERAGE GLUCOSE BLD GHB EST-MCNC: 200 MG/DL
GFR SERPL CREATININE-BSD FRML MDRD: 70 ML/MIN/1.73SQ M
GLUCOSE P FAST SERPL-MCNC: 207 MG/DL (ref 65–99)
HBA1C MFR BLD: 8.6 %
HCT VFR BLD AUTO: 46.1 % (ref 34.8–46.1)
HDLC SERPL-MCNC: 38 MG/DL
HGB BLD-MCNC: 14.9 G/DL (ref 11.5–15.4)
IMM GRANULOCYTES # BLD AUTO: 0.03 THOUSAND/UL (ref 0–0.2)
IMM GRANULOCYTES NFR BLD AUTO: 0 % (ref 0–2)
LDLC SERPL CALC-MCNC: 78 MG/DL (ref 0–100)
LYMPHOCYTES # BLD AUTO: 1.81 THOUSANDS/ÂΜL (ref 0.6–4.47)
LYMPHOCYTES NFR BLD AUTO: 22 % (ref 14–44)
MCH RBC QN AUTO: 28.4 PG (ref 26.8–34.3)
MCHC RBC AUTO-ENTMCNC: 32.3 G/DL (ref 31.4–37.4)
MCV RBC AUTO: 88 FL (ref 82–98)
MONOCYTES # BLD AUTO: 0.48 THOUSAND/ÂΜL (ref 0.17–1.22)
MONOCYTES NFR BLD AUTO: 6 % (ref 4–12)
NEUTROPHILS # BLD AUTO: 5.59 THOUSANDS/ÂΜL (ref 1.85–7.62)
NEUTS SEG NFR BLD AUTO: 70 % (ref 43–75)
NRBC BLD AUTO-RTO: 0 /100 WBCS
PLATELET # BLD AUTO: 262 THOUSANDS/UL (ref 149–390)
PMV BLD AUTO: 9.1 FL (ref 8.9–12.7)
POTASSIUM SERPL-SCNC: 4 MMOL/L (ref 3.5–5.3)
PROT SERPL-MCNC: 7.4 G/DL (ref 6.4–8.4)
RBC # BLD AUTO: 5.24 MILLION/UL (ref 3.81–5.12)
SODIUM SERPL-SCNC: 141 MMOL/L (ref 135–147)
TRIGL SERPL-MCNC: 201 MG/DL (ref ?–150)
TSH SERPL DL<=0.05 MIU/L-ACNC: 2.52 UIU/ML (ref 0.45–4.5)
WBC # BLD AUTO: 8.09 THOUSAND/UL (ref 4.31–10.16)

## 2025-04-28 PROCEDURE — 80061 LIPID PANEL: CPT

## 2025-04-28 PROCEDURE — 83036 HEMOGLOBIN GLYCOSYLATED A1C: CPT

## 2025-04-28 PROCEDURE — 87086 URINE CULTURE/COLONY COUNT: CPT

## 2025-04-28 PROCEDURE — 80053 COMPREHEN METABOLIC PANEL: CPT

## 2025-04-28 PROCEDURE — 84443 ASSAY THYROID STIM HORMONE: CPT

## 2025-04-28 PROCEDURE — 36415 COLL VENOUS BLD VENIPUNCTURE: CPT

## 2025-04-28 PROCEDURE — 85025 COMPLETE CBC W/AUTO DIFF WBC: CPT

## 2025-04-29 ENCOUNTER — RESULTS FOLLOW-UP (OUTPATIENT)
Dept: ENDOCRINOLOGY | Facility: HOSPITAL | Age: 60
End: 2025-04-29

## 2025-04-29 ENCOUNTER — RESULTS FOLLOW-UP (OUTPATIENT)
Dept: FAMILY MEDICINE CLINIC | Facility: HOSPITAL | Age: 60
End: 2025-04-29

## 2025-04-29 LAB — BACTERIA UR CULT: NORMAL

## 2025-05-01 ENCOUNTER — OFFICE VISIT (OUTPATIENT)
Dept: FAMILY MEDICINE CLINIC | Facility: HOSPITAL | Age: 60
End: 2025-05-01
Payer: MEDICARE

## 2025-05-01 ENCOUNTER — RESULTS FOLLOW-UP (OUTPATIENT)
Dept: FAMILY MEDICINE CLINIC | Facility: HOSPITAL | Age: 60
End: 2025-05-01

## 2025-05-01 ENCOUNTER — OFFICE VISIT (OUTPATIENT)
Dept: CARDIOLOGY CLINIC | Facility: CLINIC | Age: 60
End: 2025-05-01
Payer: MEDICARE

## 2025-05-01 VITALS
DIASTOLIC BLOOD PRESSURE: 78 MMHG | HEIGHT: 61 IN | WEIGHT: 184 LBS | BODY MASS INDEX: 34.74 KG/M2 | SYSTOLIC BLOOD PRESSURE: 140 MMHG | OXYGEN SATURATION: 97 % | HEART RATE: 76 BPM

## 2025-05-01 VITALS
SYSTOLIC BLOOD PRESSURE: 116 MMHG | DIASTOLIC BLOOD PRESSURE: 66 MMHG | BODY MASS INDEX: 35.12 KG/M2 | HEART RATE: 61 BPM | WEIGHT: 186 LBS | HEIGHT: 61 IN

## 2025-05-01 DIAGNOSIS — I10 PRIMARY HYPERTENSION: ICD-10-CM

## 2025-05-01 DIAGNOSIS — Z00.00 MEDICARE ANNUAL WELLNESS VISIT, SUBSEQUENT: ICD-10-CM

## 2025-05-01 DIAGNOSIS — N89.8 VAGINAL DISCHARGE: ICD-10-CM

## 2025-05-01 DIAGNOSIS — I25.10 CORONARY ARTERY DISEASE INVOLVING NATIVE CORONARY ARTERY OF NATIVE HEART WITHOUT ANGINA PECTORIS: Primary | ICD-10-CM

## 2025-05-01 DIAGNOSIS — Z12.31 ENCOUNTER FOR SCREENING MAMMOGRAM FOR BREAST CANCER: ICD-10-CM

## 2025-05-01 DIAGNOSIS — R80.9 MICROALBUMINURIA DUE TO TYPE 2 DIABETES MELLITUS  (HCC): ICD-10-CM

## 2025-05-01 DIAGNOSIS — J44.9 CHRONIC OBSTRUCTIVE PULMONARY DISEASE, UNSPECIFIED COPD TYPE (HCC): ICD-10-CM

## 2025-05-01 DIAGNOSIS — I25.810 CORONARY ARTERY DISEASE INVOLVING CORONARY BYPASS GRAFT OF NATIVE HEART WITHOUT ANGINA PECTORIS: ICD-10-CM

## 2025-05-01 DIAGNOSIS — E11.65 TYPE 2 DIABETES MELLITUS WITH HYPERGLYCEMIA, WITHOUT LONG-TERM CURRENT USE OF INSULIN (HCC): Primary | ICD-10-CM

## 2025-05-01 DIAGNOSIS — F33.9 DEPRESSION, RECURRENT (HCC): ICD-10-CM

## 2025-05-01 DIAGNOSIS — Z95.1 S/P CABG X 2: ICD-10-CM

## 2025-05-01 DIAGNOSIS — Z95.5 S/P INSERTION OF NON-DRUG ELUTING CORONARY ARTERY STENT: ICD-10-CM

## 2025-05-01 DIAGNOSIS — E11.40 TYPE 2 DIABETES MELLITUS WITH DIABETIC NEUROPATHY, WITHOUT LONG-TERM CURRENT USE OF INSULIN (HCC): ICD-10-CM

## 2025-05-01 DIAGNOSIS — R10.13 DYSPEPSIA: ICD-10-CM

## 2025-05-01 DIAGNOSIS — E11.29 MICROALBUMINURIA DUE TO TYPE 2 DIABETES MELLITUS  (HCC): ICD-10-CM

## 2025-05-01 DIAGNOSIS — I10 ESSENTIAL HYPERTENSION: ICD-10-CM

## 2025-05-01 DIAGNOSIS — E78.2 MIXED HYPERLIPIDEMIA: ICD-10-CM

## 2025-05-01 PROCEDURE — 99214 OFFICE O/P EST MOD 30 MIN: CPT | Performed by: INTERNAL MEDICINE

## 2025-05-01 PROCEDURE — G2211 COMPLEX E/M VISIT ADD ON: HCPCS | Performed by: NURSE PRACTITIONER

## 2025-05-01 PROCEDURE — G0439 PPPS, SUBSEQ VISIT: HCPCS | Performed by: NURSE PRACTITIONER

## 2025-05-01 PROCEDURE — 99214 OFFICE O/P EST MOD 30 MIN: CPT | Performed by: NURSE PRACTITIONER

## 2025-05-01 RX ORDER — CARVEDILOL 3.12 MG/1
3.12 TABLET ORAL 2 TIMES DAILY WITH MEALS
Qty: 180 TABLET | Refills: 3 | Status: SHIPPED | OUTPATIENT
Start: 2025-05-01

## 2025-05-01 NOTE — ASSESSMENT & PLAN NOTE
Lab Results   Component Value Date    HGBA1C 8.6 (H) 04/28/2025     Continue ARB daily as rx'd  Next microalbumin due in Oct

## 2025-05-01 NOTE — PROGRESS NOTES
Cardiology Follow-up    Marian Myers  [id number]  1965  [facility] [place] [address] [address]  [address] [address] [phone]    1. Coronary artery disease involving native coronary artery of native heart without angina pectoris  carvedilol (COREG) 3.125 mg tablet      2. Coronary artery disease involving coronary bypass graft of native heart without angina pectoris        3. Primary hypertension  carvedilol (COREG) 3.125 mg tablet      4. S/P CABG x 2        5. S/P insertion of non-drug eluting coronary artery stent        6. Mixed hyperlipidemia        7. Essential hypertension              Discussion/Summary:    1.  CAD - Marian had drug-eluting stent placed to her left circumflex in 2011, CABG x2 in 2012, and then a drug-eluting stent placed to her SVG to RCA graft in 2015.  She has done well since.  We did updated testing back in November 2019 which was for the most part unremarkable.  She has a fixed inferior defect, that likely could be from diaphragmatic attenuation artifact or a small scar.  No ischemia seen.  She has normal LV systolic function.  With her fatigue and hair thinning I told her it was fine to cut the carvedilol in half to 3.125 mg twice daily.  No testing is needed and we will see her back in 1 year.    2.  Hypertension - Her blood pressure has been under good control and if anything runs a bit on the low side.  Last year we stopped the HCTZ.  This year we are cutting back the dose of carvedilol and she will continue losartan.  She should periodically check her blood pressure at home.    3.  Hypercholesterolemia - She was switched to rosuvastatin 40 mg daily.  Her LDL is at goal.    4.  GABI - She went back on CPAP a few years back, and she is intermittently on it now.     5.  Type 2 diabetes mellitus - Her blood sugars have been uncontrolled which did improve, but then primitivo again.   She she is having side effects to Farxiga.  She is following up with her PCP today and then endocrinology next month.  I do feel that the uncontrolled diabetes is contributing to her neuropathic symptoms.      HPI:    Mrs. Myers comes in for follow-up given her cardiac history.  Marian had coronary artery disease diagnosed back in 2011. In January 2011 she had a drug-eluting stent placed to the left circumflex.  Then in 2012 she had CABG x2 with a LIMA to the LAD and a SVG to RCA.  She had this work done at [facility].  She was then following with [facility], in 2015 had a cardiac catheterization that showed critical stenosis in the distal portion of her vein graft to her RCA.  She received a drug-eluting stent to this region.  She has normal LV systolic function without valve disease by echocardiogram earlier this year.  She does have obstructive sleep apnea and was started on CPAP.  But currently is not using it.    At our initial consultation Marian was noticing some increasing fatigue, shortness of breath and exercise intolerance.  She was off CPAP as she had moved from [place] and had not on packed it as of yet.  We did updated testing that included a stress nuclear study and echocardiogram in 2019.  Echocardiogram showed normal LV systolic function without significant valve disease.  Stress nuclear study showed a fixed inferior defect, either representing an old infarct versus attenuation artifact.  No ischemia seen.  She had another echocardiogram last year that showed no significant change.    One issue with Marian has been uncontrolled diabetes mellitus.  Her hemoglobin A1c's have ranged between 9 and 11.  Last year we started Jardiance 10 mg daily, but she had some side effects to this and switch to Farxiga.  She also believes she is having side effects to the Farxiga.  Her hemoglobin A1c did improve last year to 6.8 but her most recent check is 8.6.    Marian has had a lot of noncardiac  symptoms.  She sounds as if she is developing neuropathy as she has changes in hot/cold temperatures throughout her body and extremities.  She also has pins-and-needles.  She has had some early satiety and nausea since going up on the dose of her Farxiga.  She has arthralgias and musculoskeletal type pains particularly of her back.  She also has had some fatigue, tiredness throughout the day and her hair has been thinning.  She does not have any cardiac symptoms.  No chest pains or any symptoms of angina.  No worsening shortness of breath or any signs/symptoms of CHF.    Patient Active Problem List   Diagnosis    Coronary artery disease involving native coronary artery of native heart without angina pectoris    S/P CABG x 2    Coronary artery disease involving coronary bypass graft of native heart without angina pectoris    S/P insertion of non-drug eluting coronary artery stent    Hypertension    Hyperlipidemia    Sleep apnea    Type 2 diabetes mellitus, without long-term current use of insulin ([facility])    BMI 35.0-35.9,adult    Insomnia    Depression, recurrent ([facility])    Chronic obstructive pulmonary disease, unspecified COPD type ([facility])    Microalbuminuria due to type 2 diabetes mellitus  ([facility])     Past Medical History:   Diagnosis Date    Circulation problem     Coronary artery disease involving coronary bypass graft of native heart without angina pectoris 11/12/2019    Diabetes ([facility])     Dyspnea on exertion 11/12/2019    Glaucoma     Hearing problem     Heart attack ([facility])     X 3    Heart valve problem     High blood pressure     High cholesterol     Hypoxia 07/29/2022    Kidney stone 2022    left    Moderate episode of recurrent major depressive disorder ([facility]) 03/23/2023    Obesity, morbid ([facility]) 01/25/2021    Open injury of heart     Pyelitis 07/27/2022    Sepsis ([facility]) 07/27/2022    Sleep apnea      Social History     Socioeconomic History    Marital status:      Spouse name: Not on file    Number of  children: Not on file    Years of education: Not on file    Highest education level: Not on file   Occupational History    Not on file   Tobacco Use    Smoking status: Former     Current packs/day: 0.00     Types: Cigarettes     Quit date:      Years since quittin.3    Smokeless tobacco: Never   Vaping Use    Vaping status: Never Used   Substance and Sexual Activity    Alcohol use: Yes     Comment: special events    Drug use: Yes     Types: Marijuana     Comment: daily prn    Sexual activity: Not on file   Other Topics Concern    Not on file   Social History Narrative    Not on file     Social Drivers of Health     Financial Resource Strain: Low Risk  (3/23/2023)    Overall Financial Resource Strain (CARDIA)     Difficulty of Paying Living Expenses: Not hard at all   Food Insecurity: No Food Insecurity (3/25/2024)    Nursing - Inadequate Food Risk Classification     Worried About Running Out of Food in the Last Year: Never true     Ran Out of Food in the Last Year: Never true     Ran Out of Food in the Last Year: Not on file   Transportation Needs: No Transportation Needs (3/25/2024)    PRAPARE - Transportation     Lack of Transportation (Medical): No     Lack of Transportation (Non-Medical): No   Physical Activity: Not on file   Stress: Not on file   Social Connections: Not on file   Intimate Partner Violence: Not on file   Housing Stability: Unknown (3/25/2024)    Housing Stability Vital Sign     Unable to Pay for Housing in the Last Year: No     Number of Times Moved in the Last Year: Not on file     Homeless in the Last Year: No      Family History   Problem Relation Age of Onset    Hypertension Father     Coronary artery disease Father     Sudden death Father         cardiac 2014    No Known Problems Son     No Known Problems Daughter      Past Surgical History:   Procedure Laterality Date    CORONARY ARTERY BYPASS GRAFT      CORONARY STENT PLACEMENT  2010    CORONARY STENT PLACEMENT   "2015 June    IR NEPHROSTOMY TUBE CHECK/CHANGE/REPOSITION/REINSERTION/UPSIZE  08/18/2022    IR NEPHROSTOMY TUBE PLACEMENT  07/27/2022    TONSILECTOMY AND ADNOIDECTOMY      VAGINAL DELIVERY  1987    VAGINAL DELIVERY  1989       Current Outpatient Medications:     aspirin (ECOTRIN LOW STRENGTH) 81 mg EC tablet, Take 81 mg by mouth daily, Disp: , Rfl:     buPROPion (WELLBUTRIN XL) 150 mg 24 hr tablet, TAKE 1 TABLET BY MOUTH DAILY IN  THE MORNING, Disp: 90 tablet, Rfl: 0    carvedilol (COREG) 3.125 mg tablet, Take 1 tablet (3.125 mg total) by mouth 2 (two) times a day with meals, Disp: 180 tablet, Rfl: 3    dapagliflozin (Farxiga) 10 MG tablet, Take 1 tablet (10 mg total) by mouth daily, Disp: 90 tablet, Rfl: 3    escitalopram (LEXAPRO) 10 mg tablet, Take 1 tablet (10 mg total) by mouth 2 (two) times a day, Disp: 180 tablet, Rfl: 1    glipiZIDE (GLUCOTROL XL) 10 mg 24 hr tablet, TAKE 1 TABLET BY MOUTH TWICE  DAILY, Disp: 180 tablet, Rfl: 1    glucose blood (OneTouch Ultra) test strip, Use 1 daily, Disp: 90 strip, Rfl: 2    Lancets 28G MISC, Use 1 Box 3 (three) times a day One Touch Ultra Mini lancets, Disp: 3 each, Rfl: 2    losartan (COZAAR) 50 mg tablet, TAKE 1 TABLET BY MOUTH DAILY, Disp: 90 tablet, Rfl: 0    rosuvastatin (CRESTOR) 40 MG tablet, TAKE 1 TABLET BY MOUTH DAILY, Disp: 90 tablet, Rfl: 0    zolpidem (AMBIEN) 10 mg tablet, Take 1 tablet (10 mg total) by mouth daily at bedtime as needed for sleep, Disp: 90 tablet, Rfl: 0  Allergies   Allergen Reactions    Metformin Diarrhea    Penicillins Hives     Vitals:    05/01/25 1324   BP: 116/66   BP Location: Left arm   Patient Position: Sitting   Cuff Size: Standard   Pulse: 61   Weight: 84.4 kg (186 lb)   Height: 5' 1\" (1.549 m)       Labs:  Lab Results   Component Value Date    K 4.0 04/28/2025     04/28/2025    CO2 28 04/28/2025    BUN 18 04/28/2025    CREATININE 0.90 04/28/2025    CALCIUM 9.2 04/28/2025     Lab Results   Component Value Date    WBC 8.09 " 04/28/2025    HGB 14.9 04/28/2025    HCT 46.1 04/28/2025    MCV 88 04/28/2025     04/28/2025       Imaging:  ECG today sinus rhythm with a first-degree AV block, right axis deviation and nonspecific T wave changes.      STRESS NUCLEAR (11/2019):  SUMMARY:  -  Stress results: Duration of exercise was 6 min and 45 sec. Target heart rate was not achieved. The patient experienced chest pain during stress; pain resolved spontaneously.  -  ECG conclusions: The stress ECG was negative for ischemia and normal.  -  Perfusion imaging: There was a small to moderate, moderately severe, fixed myocardial perfusion defect of the inferolateral wall.  -  Gated SPECT: The calculated left ventricular ejection fraction was 67 %. Left ventricular ejection fraction was within normal limits by visual estimate. There was no left ventricular regional abnormality.     IMPRESSIONS: Abnormal study after maximal exercise without reproduction of symptoms. There was a small infarct in the inferolateral region. Left ventricular systolic function was normal.      ECHO (8/24/2022):    Left Ventricle: Left ventricular cavity size is normal. Wall thickness is normal. The left ventricular ejection fraction is 55%. Systolic function is normal. Wall motion is normal. Diastolic function is mildly abnormal, consistent with grade I (abnormal) relaxation.      Review of Systems:  Review of Systems   Constitutional:  Positive for fatigue.   HENT: Negative.     Eyes: Negative.    Respiratory: Negative.     Cardiovascular: Negative.    Gastrointestinal: Negative.    Musculoskeletal:  Positive for back pain.   Skin: Negative.    Allergic/Immunologic: Negative.    Neurological: Negative.    Hematological: Negative.    Psychiatric/Behavioral: Negative.     All other systems reviewed and are negative.    Vitals:    05/01/25 1324   BP: 116/66   BP Location: Left arm   Patient Position: Sitting   Cuff Size: Standard   Pulse: 61   Weight: 84.4 kg (186 lb)  "  Height: 5' 1\" (1.549 m)     Physical Exam  Vitals and nursing note reviewed.   Constitutional:       Appearance: She is well-developed.   HENT:      Head: Normocephalic and atraumatic.   Eyes:      General: No scleral icterus.        Right eye: No discharge.         Left eye: No discharge.      Pupils: Pupils are equal, round, and reactive to light.   Neck:      Thyroid: No thyromegaly.      Vascular: No JVD.   Cardiovascular:      Rate and Rhythm: Normal rate and regular rhythm. No extrasystoles are present.     Pulses: Normal pulses. No decreased pulses.      Heart sounds: Normal heart sounds, S1 normal and S2 normal. No murmur heard.     No friction rub. No gallop.   Pulmonary:      Effort: Pulmonary effort is normal. No respiratory distress.      Breath sounds: Normal breath sounds. No wheezing, rhonchi or rales.   Abdominal:      General: Bowel sounds are normal. There is no distension.      Palpations: Abdomen is soft.      Tenderness: There is no abdominal tenderness.   Musculoskeletal:         General: No tenderness or deformity. Normal range of motion.      Cervical back: Normal range of motion and neck supple.      Right lower leg: No edema.      Left lower leg: No edema.   Skin:     General: Skin is warm and dry.      Findings: No rash.   Neurological:      Mental Status: She is alert and oriented to person, place, and time.      Cranial Nerves: No cranial nerve deficit.   Psychiatric:         Thought Content: Thought content normal.         Judgment: Judgment normal.     Counseling / Coordination of Care  Total office time spent today 25 minutes.  Greater than 50% of total time was spent with the patient and / or family counseling and / or coordination of care.   "

## 2025-05-01 NOTE — ASSESSMENT & PLAN NOTE
Lab Results   Component Value Date    HGBA1C 8.6 (H) 04/28/2025     Explained her neuropathic sx's are likely due to poorly controlled DM  Offered gabapentin rx which she declines at this time

## 2025-05-01 NOTE — PROGRESS NOTES
Name: Marian Myers      : 1965      MRN: [id number]  Encounter Provider: JEANCARLOS Anne  Encounter Date: 2025   Encounter department: [facility] PRIMARY CARE [address]   :  Assessment & Plan  Type 2 diabetes mellitus with hyperglycemia, without long-term current use of insulin (Formerly [facility])    Lab Results   Component Value Date    HGBA1C 8.6 (H) 2025     A1C elevated/poorly controlled  She is established with endocrine and has an appt upcoming in  - advise she continue same meds in the meantime and optimize diabetic diet  IRIS eye exam updated in office today - pt states she cannot afford eye MD  Foot exam UTD   Continue daily statin    Orders:    IRIS Diabetic eye exam    Microalbuminuria due to type 2 diabetes mellitus  (Formerly [facility])    Lab Results   Component Value Date    HGBA1C 8.6 (H) 2025     Continue ARB daily as rx'd  Next microalbumin due in Oct       Type 2 diabetes mellitus with diabetic neuropathy, without long-term current use of insulin (Formerly [facility])    Lab Results   Component Value Date    HGBA1C 8.6 (H) 2025     Explained her neuropathic sx's are likely due to poorly controlled DM  Offered gabapentin rx which she declines at this time        Chronic obstructive pulmonary disease, unspecified COPD type (Formerly [facility])  Clinically stable/asymptomatic w/o recent exacerbation       Depression, recurrent (Formerly [facility])  Depression Screening Follow-up Plan: Patient's depression screening was positive with a PHQ-9 score of 5. Patient with underlying depression and was advised to continue current medications as prescribed.  She denies need for change in meds at this time - continue same bupropion and lexapro as rx'd       Dyspepsia  Offered GERD rx but she declines need for at this time  Consider need for GI consult if sx's persist/worsen       Medicare annual wellness visit, subsequent  AWV updated, next due in 1 year       Encounter for screening mammogram for breast cancer    Orders:    Mammo  "screening bilateral w 3d and cad; Future    Vaginal discharge  Return to update gyn exam          Depression Screening and Follow-up Plan: Patient's depression screening was positive with a PHQ-9 score of 5.   Patient with underlying depression and was advised to continue current medications as prescribed.       Preventive health issues were discussed with patient, and age appropriate screening tests were ordered as noted in patient's After Visit Summary. Personalized health advice and appropriate referrals for health education or preventive services given if needed, as noted in patient's After Visit Summary.    History of Present Illness         Here for AWV with multiple complaints. States she is having trouble with higher dose of farxiga rx'd by endocrine.  Reads many symptoms off a list on her phone she is concerned might be side effects. States she has pins and needles in feet and private area, has a stiff crampy neck, her legs twitch, ears pop and ring, is burping a lot marion in the evening, and has a sniffly nose and dry eyes.   Saw her cardiologist earlier today.   Is scheduled to see endo next month.         Patient Care Team:  JEANCARLOS Anne as PCP - General (Family Medicine)  Susan Freeman MD as PCP - Endocrinology (Endocrinology)  Tavia Nelson RD as Diabetes Educator (Dietician)        Review of Systems   Constitutional:  Positive for fatigue (always yawning).   Eyes:  Positive for photophobia (worse in sunshine).   Respiratory: Negative.     Cardiovascular: Negative.    Gastrointestinal:  Positive for nausea. Negative for vomiting.   Endocrine: Positive for polydipsia.   Genitourinary:  Positive for vaginal discharge (white).   Musculoskeletal:  Positive for back pain (flares of muscle pain and aches).   Skin:         Hair thinning   Psychiatric/Behavioral:  Positive for dysphoric mood. Negative for suicidal ideas. The patient is nervous/anxious (\"stress really bothers me\").         \"In circles in " "my head and making sure things get done\"         Medical History Reviewed by provider this encounter:  Tobacco  Allergies  Meds  Problems  Med Hx  Surg Hx  Fam Hx       Annual Wellness Visit Questionnaire   Marian is here for her Subsequent Wellness visit. Last Medicare Wellness visit information reviewed, patient interviewed and updates made to the record today.      Health Risk Assessment:   Patient rates overall health as very good. Patient feels that their physical health rating is slightly worse. Patient is dissatisfied with their life. Eyesight was rated as slightly worse. Hearing was rated as slightly worse. Patient feels that their emotional and mental health rating is same. Patients states they are sometimes angry. Patient states they are often unusually tired/fatigued. Pain experienced in the last 7 days has been some. Patient's pain rating has been 3/10. Patient states that she has experienced no weight loss or gain in last 6 months.     Depression Screening:   PHQ-9 Score: 5      Fall Risk Screening:   In the past year, patient has experienced: no history of falling in past year      Urinary Incontinence Screening:   Patient has leaked urine accidently in the last six months.     Home Safety:  Patient does not have trouble with stairs inside or outside of their home. Patient has working smoke alarms and has working carbon monoxide detector. Home safety hazards include: none.     Nutrition:   Current diet is Regular.     Medications:   Patient is not currently taking any over-the-counter supplements. Patient is able to manage medications.     Activities of Daily Living (ADLs)/Instrumental Activities of Daily Living (IADLs):   Walk and transfer into and out of bed and chair?: Yes  Dress and groom yourself?: Yes    Bathe or shower yourself?: Yes    Feed yourself? Yes  Do your laundry/housekeeping?: Yes  Manage your money, pay your bills and track your expenses?: Yes  Make your own meals?: Yes    Do " your own shopping?: Yes    Previous Hospitalizations:   Any hospitalizations or ED visits within the last 12 months?: No      Advance Care Planning:   Living will: No    Durable POA for healthcare: No    Advanced directive: No      Preventive Screenings      Cardiovascular Screening:    General: Screening Not Indicated and History Lipid Disorder      Diabetes Screening:     General: Screening Not Indicated and History Diabetes      Colorectal Cancer Screening:     General: Patient Declines      Breast Cancer Screening:       Due for: Mammogram        Cervical Cancer Screening:    General: Screening Current      Osteoporosis Screening:    General: Screening Not Indicated      Abdominal Aortic Aneurysm (AAA) Screening:        General: Screening Not Indicated      Lung Cancer Screening:     General: Screening Not Indicated      Hepatitis C Screening:    General: Screening Current    Immunizations:  - Immunizations due: Prevnar 20 and Zoster (Shingrix)    Screening, Brief Intervention, and Referral to Treatment (SBIRT)     Screening  Typical number of drinks in a day: 0  Typical number of drinks in a week: 0  Interpretation: Low risk drinking behavior.    Single Item Drug Screening:  How often have you used an illegal drug (including marijuana) or a prescription medication for non-medical reasons in the past year? never    Single Item Drug Screen Score: 0  Interpretation: Negative screen for possible drug use disorder    Social Drivers of Health     Financial Resource Strain: Low Risk  (3/23/2023)    Overall Financial Resource Strain (CARDIA)     Difficulty of Paying Living Expenses: Not hard at all   Food Insecurity: Patient Declined (5/1/2025)    Hunger Vital Sign     Worried About Running Out of Food in the Last Year: Patient declined     Ran Out of Food in the Last Year: Patient declined   Transportation Needs: Patient Declined (5/1/2025)    PRAPARE - Transportation     Lack of Transportation (Medical): Patient  "declined     Lack of Transportation (Non-Medical): Patient declined   Housing Stability: Patient Declined (5/1/2025)    Housing Stability Vital Sign     Unable to Pay for Housing in the Last Year: Patient declined     Number of Times Moved in the Last Year: 0     Homeless in the Last Year: Patient declined   Utilities: Patient Declined (5/1/2025)    [facility] Utilities     Threatened with loss of utilities: Patient declined     Vision Screening    Right eye Left eye Both eyes   Without correction      With correction 20/50 20/70 20/50       Objective   /78 (Patient Position: Sitting, Cuff Size: Large)   Pulse 76   Ht 5' 1\" (1.549 m)   Wt 83.5 kg (184 lb)   SpO2 97%   BMI 34.77 kg/m²         Physical Exam  Vitals reviewed.   Constitutional:       General: She is not in acute distress.     Appearance: Normal appearance. She is obese.   HENT:      Head: Normocephalic.   Eyes:      General: No scleral icterus.  Neck:      Vascular: No carotid bruit.   Cardiovascular:      Rate and Rhythm: Normal rate and regular rhythm.      Heart sounds: Murmur heard.   Pulmonary:      Effort: Pulmonary effort is normal. No respiratory distress.      Breath sounds: Normal breath sounds.   Musculoskeletal:      Cervical back: Normal range of motion.      Right lower leg: No edema.      Left lower leg: No edema.   Lymphadenopathy:      Cervical: No cervical adenopathy.   Skin:     General: Skin is warm and dry.   Neurological:      General: No focal deficit present.      Mental Status: She is alert and oriented to person, place, and time.   Psychiatric:         Attention and Perception: Attention normal.         Mood and Affect: Mood is anxious.         Speech: Speech normal.         Behavior: Behavior normal. Behavior is cooperative.         Cognition and Memory: Cognition and memory normal.         Administrative Statements   I have spent a total time of 30 minutes in caring for this patient on the day of the visit/encounter " including Diagnostic results, Risks and benefits of tx options, Instructions for management, Patient and family education, Importance of tx compliance, Risk factor reductions, Impressions, Counseling / Coordination of care, Documenting in the medical record, Reviewing/placing orders in the medical record (including tests, medications, and/or procedures), and Obtaining or reviewing history  .      Depression Screening Follow-up Plan: Patient's depression screening was positive with a PHQ-9 score of 5. Patient with underlying depression and was advised to continue current medications as prescribed.

## 2025-05-01 NOTE — PATIENT INSTRUCTIONS
Medicare Preventive Visit Patient Instructions  Thank you for completing your Welcome to Medicare Visit or Medicare Annual Wellness Visit today. Your next wellness visit will be due in one year (5/2/2026).  The screening/preventive services that you may require over the next 5-10 years are detailed below. Some tests may not apply to you based off risk factors and/or age. Screening tests ordered at today's visit but not completed yet may show as past due. Also, please note that scanned in results may not display below.  Preventive Screenings:  Service Recommendations Previous Testing/Comments   Colorectal Cancer Screening  * Colonoscopy    * Fecal Occult Blood Test (FOBT)/Fecal Immunochemical Test (FIT)  * Fecal DNA/Cologuard Test  * Flexible Sigmoidoscopy Age: 45-75 years old   Colonoscopy: every 10 years (may be performed more frequently if at higher risk)  OR  FOBT/FIT: every 1 year  OR  Cologuard: every 3 years  OR  Sigmoidoscopy: every 5 years  Screening may be recommended earlier than age 45 if at higher risk for colorectal cancer. Also, an individualized decision between you and your healthcare provider will decide whether screening between the ages of 76-85 would be appropriate. Colonoscopy: Not on file  FOBT/FIT: Not on file  Cologuard: Not on file  Sigmoidoscopy: Not on file          Breast Cancer Screening Age: 40+ years old  Frequency: every 1-2 years  Not required if history of left and right mastectomy Mammogram: Not on file        Cervical Cancer Screening Between the ages of 21-29, pap smear recommended once every 3 years.   Between the ages of 30-65, can perform pap smear with HPV co-testing every 5 years.   Recommendations may differ for women with a history of total hysterectomy, cervical cancer, or abnormal pap smears in past. Pap Smear: 12/13/2022    Screening Current   Hepatitis C Screening Once for adults born between 1945 and 1965  More frequently in patients at high risk for Hepatitis C Hep C  Antibody: 06/03/2022    Screening Current   Diabetes Screening 1-2 times per year if you're at risk for diabetes or have pre-diabetes Fasting glucose: 207 mg/dL (4/28/2025)  A1C: 8.6 % (4/28/2025)  Screening Not Indicated  History Diabetes   Cholesterol Screening Once every 5 years if you don't have a lipid disorder. May order more often based on risk factors. Lipid panel: 04/28/2025    Screening Not Indicated  History Lipid Disorder     Other Preventive Screenings Covered by Medicare:  Abdominal Aortic Aneurysm (AAA) Screening: covered once if your at risk. You're considered to be at risk if you have a family history of AAA.  Lung Cancer Screening: covers low dose CT scan once per year if you meet all of the following conditions: (1) Age 55-77; (2) No signs or symptoms of lung cancer; (3) Current smoker or have quit smoking within the last 15 years; (4) You have a tobacco smoking history of at least 20 pack years (packs per day multiplied by number of years you smoked); (5) You get a written order from a healthcare provider.  Glaucoma Screening: covered annually if you're considered high risk: (1) You have diabetes OR (2) Family history of glaucoma OR (3)  aged 50 and older OR (4)  American aged 65 and older  Osteoporosis Screening: covered every 2 years if you meet one of the following conditions: (1) You're estrogen deficient and at risk for osteoporosis based off medical history and other findings; (2) Have a vertebral abnormality; (3) On glucocorticoid therapy for more than 3 months; (4) Have primary hyperparathyroidism; (5) On osteoporosis medications and need to assess response to drug therapy.   Last bone density test (DXA Scan): Not on file.  HIV Screening: covered annually if you're between the age of 15-65. Also covered annually if you are younger than 15 and older than 65 with risk factors for HIV infection. For pregnant patients, it is covered up to 3 times per  pregnancy.    Immunizations:  Immunization Recommendations   Influenza Vaccine Annual influenza vaccination during flu season is recommended for all persons aged >= 6 months who do not have contraindications   Pneumococcal Vaccine   * Pneumococcal conjugate vaccine = PCV13 (Prevnar 13), PCV15 (Vaxneuvance), PCV20 (Prevnar 20)  * Pneumococcal polysaccharide vaccine = PPSV23 (Pneumovax) Adults 19-63 yo with certain risk factors or if 65+ yo  If never received any pneumonia vaccine: recommend Prevnar 20 (PCV20)  Give PCV20 if previously received 1 dose of PCV13 or PPSV23   Hepatitis B Vaccine 3 dose series if at intermediate or high risk (ex: diabetes, end stage renal disease, liver disease)   Respiratory syncytial virus (RSV) Vaccine - COVERED BY MEDICARE PART D  * RSVPreF3 (Arexvy) CDC recommends that adults 60 years of age and older may receive a single dose of RSV vaccine using shared clinical decision-making (SCDM)   Tetanus (Td) Vaccine - COST NOT COVERED BY MEDICARE PART B Following completion of primary series, a booster dose should be given every 10 years to maintain immunity against tetanus. Td may also be given as tetanus wound prophylaxis.   Tdap Vaccine - COST NOT COVERED BY MEDICARE PART B Recommended at least once for all adults. For pregnant patients, recommended with each pregnancy.   Shingles Vaccine (Shingrix) - COST NOT COVERED BY MEDICARE PART B  2 shot series recommended in those 19 years and older who have or will have weakened immune systems or those 50 years and older     Health Maintenance Due:      Topic Date Due   • Breast Cancer Screening: Mammogram  Never done   • Colorectal Cancer Screening  Never done   • Cervical Cancer Screening  12/13/2027   • HIV Screening  Completed   • Hepatitis C Screening  Completed     Immunizations Due:      Topic Date Due   • Pneumococcal Vaccine: Pediatrics (0 to 5 Years) and At-Risk Patients (6 to 64 Years) (1 of 2 - PCV) Never done   • COVID-19 Vaccine (1 -  2024-25 season) Never done     Advance Directives   What are advance directives?  Advance directives are legal documents that state your wishes and plans for medical care. These plans are made ahead of time in case you lose your ability to make decisions for yourself. Advance directives can apply to any medical decision, such as the treatments you want, and if you want to donate organs.   What are the types of advance directives?  There are many types of advance directives, and each state has rules about how to use them. You may choose a combination of any of the following:  Living will:  This is a written record of the treatment you want. You can also choose which treatments you do not want, which to limit, and which to stop at a certain time. This includes surgery, medicine, IV fluid, and tube feedings.   Durable power of  for healthcare (DPAHC):  This is a written record that states who you want to make healthcare choices for you when you are unable to make them for yourself. This person, called a proxy, is usually a family member or a friend. You may choose more than 1 proxy.  Do not resuscitate (DNR) order:  A DNR order is used in case your heart stops beating or you stop breathing. It is a request not to have certain forms of treatment, such as CPR. A DNR order may be included in other types of advance directives.  Medical directive:  This covers the care that you want if you are in a coma, near death, or unable to make decisions for yourself. You can list the treatments you want for each condition. Treatment may include pain medicine, surgery, blood transfusions, dialysis, IV or tube feedings, and a ventilator (breathing machine).  Values history:  This document has questions about your views, beliefs, and how you feel and think about life. This information can help others choose the care that you would choose.  Why are advance directives important?  An advance directive helps you control your care.  Although spoken wishes may be used, it is better to have your wishes written down. Spoken wishes can be misunderstood, or not followed. Treatments may be given even if you do not want them. An advance directive may make it easier for your family to make difficult choices about your care.   Urinary Incontinence   Urinary incontinence (UI)  is when you lose control of your bladder. UI develops because your bladder cannot store or empty urine properly. The 3 most common types of UI are stress incontinence, urge incontinence, or both.  Medicines:   May be given to help strengthen your bladder control. Report any side effects of medication to your healthcare provider.  Do pelvic muscle exercises often:  Your pelvic muscles help you stop urinating. Squeeze these muscles tight for 5 seconds, then relax for 5 seconds. Gradually work up to squeezing for 10 seconds. Do 3 sets of 15 repetitions a day, or as directed. This will help strengthen your pelvic muscles and improve bladder control.  Train your bladder:  Go to the bathroom at set times, such as every 2 hours, even if you do not feel the urge to go. You can also try to hold your urine when you feel the urge to go. For example, hold your urine for 5 minutes when you feel the urge to go. As that becomes easier, hold your urine for 10 minutes.   Self-care:   Keep a UI record.  Write down how often you leak urine and how much you leak. Make a note of what you were doing when you leaked urine.  Drink liquids as directed. You may need to limit the amount of liquid you drink to help control your urine leakage. Do not drink any liquid right before you go to bed. Limit or do not have drinks that contain caffeine or alcohol.   Prevent constipation.  Eat a variety of high-fiber foods. Good examples are high-fiber cereals, beans, vegetables, and whole-grain breads. Walking is the best way to trigger your intestines to have a bowel movement.  Exercise regularly and maintain a  healthy weight.  Weight loss and exercise will decrease pressure on your bladder and help you control your leakage.   Use a catheter as directed  to help empty your bladder. A catheter is a tiny, plastic tube that is put into your bladder to drain your urine.   Go to behavior therapy as directed.  Behavior therapy may be used to help you learn to control your urge to urinate.    Weight Management   Why it is important to manage your weight:  Being overweight increases your risk of health conditions such as heart disease, high blood pressure, type 2 diabetes, and certain types of cancer. It can also increase your risk for osteoarthritis, sleep apnea, and other respiratory problems. Aim for a slow, steady weight loss. Even a small amount of weight loss can lower your risk of health problems.  How to lose weight safely:  A safe and healthy way to lose weight is to eat fewer calories and get regular exercise. You can lose up about 1 pound a week by decreasing the number of calories you eat by 500 calories each day.   Healthy meal plan for weight management:  A healthy meal plan includes a variety of foods, contains fewer calories, and helps you stay healthy. A healthy meal plan includes the following:  Eat whole-grain foods more often.  A healthy meal plan should contain fiber. Fiber is the part of grains, fruits, and vegetables that is not broken down by your body. Whole-grain foods are healthy and provide extra fiber in your diet. Some examples of whole-grain foods are whole-wheat breads and pastas, oatmeal, brown rice, and bulgur.  Eat a variety of vegetables every day.  Include dark, leafy greens such as spinach, kale, radhika greens, and mustard greens. Eat yellow and orange vegetables such as carrots, sweet potatoes, and winter squash.   Eat a variety of fruits every day.  Choose fresh or canned fruit (canned in its own juice or light syrup) instead of juice. Fruit juice has very little or no fiber.  Eat low-fat  dairy foods.  Drink fat-free (skim) milk or 1% milk. Eat fat-free yogurt and low-fat cottage cheese. Try low-fat cheeses such as mozzarella and other reduced-fat cheeses.  Choose meat and other protein foods that are low in fat.  Choose beans or other legumes such as split peas or lentils. Choose fish, skinless poultry (chicken or turkey), or lean cuts of red meat (beef or pork). Before you cook meat or poultry, cut off any visible fat.   Use less fat and oil.  Try baking foods instead of frying them. Add less fat, such as margarine, sour cream, regular salad dressing and mayonnaise to foods. Eat fewer high-fat foods. Some examples of high-fat foods include french fries, doughnuts, ice cream, and cakes.  Eat fewer sweets.  Limit foods and drinks that are high in sugar. This includes candy, cookies, regular soda, and sweetened drinks.  Exercise:  Exercise at least 30 minutes per day on most days of the week. Some examples of exercise include walking, biking, dancing, and swimming. You can also fit in more physical activity by taking the stairs instead of the elevator or parking farther away from stores. Ask your healthcare provider about the best exercise plan for you.      © Copyright [employer] 2018 Information is for End User's use only and may not be sold, redistributed or otherwise used for commercial purposes. All illustrations and images included in CareNotes® are the copyrighted property of [employer] or [employer]

## 2025-05-01 NOTE — ASSESSMENT & PLAN NOTE
Lab Results   Component Value Date    HGBA1C 8.6 (H) 04/28/2025     A1C elevated/poorly controlled  She is established with endocrine and has an appt upcoming in June - advise she continue same meds in the meantime and optimize diabetic diet  [facility] eye exam updated in office today - pt states she cannot afford eye MD  Foot exam UTD   Continue daily statin    Orders:    [facility] Diabetic eye exam

## 2025-05-01 NOTE — ASSESSMENT & PLAN NOTE
Depression Screening Follow-up Plan: Patient's depression screening was positive with a PHQ-9 score of 5. Patient with underlying depression and was advised to continue current medications as prescribed.  She denies need for change in meds at this time - continue same bupropion and lexapro as rx'd

## 2025-05-05 DIAGNOSIS — E78.2 MIXED HYPERLIPIDEMIA: ICD-10-CM

## 2025-05-06 RX ORDER — ROSUVASTATIN CALCIUM 40 MG/1
40 TABLET, COATED ORAL DAILY
Qty: 90 TABLET | Refills: 1 | Status: SHIPPED | OUTPATIENT
Start: 2025-05-06

## 2025-06-04 ENCOUNTER — OFFICE VISIT (OUTPATIENT)
Dept: ENDOCRINOLOGY | Facility: HOSPITAL | Age: 60
End: 2025-06-04
Payer: MEDICARE

## 2025-06-04 VITALS
BODY MASS INDEX: 35.12 KG/M2 | SYSTOLIC BLOOD PRESSURE: 140 MMHG | WEIGHT: 186 LBS | HEIGHT: 61 IN | HEART RATE: 68 BPM | OXYGEN SATURATION: 98 % | DIASTOLIC BLOOD PRESSURE: 80 MMHG

## 2025-06-04 DIAGNOSIS — I10 PRIMARY HYPERTENSION: ICD-10-CM

## 2025-06-04 DIAGNOSIS — E78.2 MIXED HYPERLIPIDEMIA: ICD-10-CM

## 2025-06-04 DIAGNOSIS — E11.65 TYPE 2 DIABETES MELLITUS WITH HYPERGLYCEMIA, WITHOUT LONG-TERM CURRENT USE OF INSULIN (HCC): Primary | ICD-10-CM

## 2025-06-04 PROCEDURE — 99214 OFFICE O/P EST MOD 30 MIN: CPT | Performed by: PHYSICIAN ASSISTANT

## 2025-06-04 PROCEDURE — G2211 COMPLEX E/M VISIT ADD ON: HCPCS | Performed by: PHYSICIAN ASSISTANT

## 2025-06-04 RX ORDER — LINAGLIPTIN 5 MG/1
5 TABLET, FILM COATED ORAL DAILY
Qty: 90 TABLET | Refills: 1 | Status: SHIPPED | OUTPATIENT
Start: 2025-06-04

## 2025-06-04 NOTE — ASSESSMENT & PLAN NOTE
Most recent hemoglobin A1c has increased.  This was even prior to her discontinuing her Farxiga on her own.  Although I do not believe some of her major concerns with the Farxiga are from the medication, I am fine with discontinuing it at this time.  If needed we could try a different SGLT2.  Discussed possible options of other medication classes to try such as a GLP-1, pioglitazone, and DPP 4 inhibitor.  At this time she would like to try Tradjenta 5 mg daily.  Prescription was sent over to the pharmacy.  Discussed side effects of medication.  Recommend checking blood sugars at least daily.  Continue working on lifestyle to help improve glucose levels.  Contact the office with any concerns or questions.  Follow-up in 3 months with labwork completed prior to visit.  Lab Results   Component Value Date    HGBA1C 8.6 (H) 04/28/2025       Orders:  •  linaGLIPtin (Tradjenta) 5 MG TABS; Take 5 mg by mouth daily  •  Hemoglobin A1C; Future  •  Comprehensive metabolic panel; Future

## 2025-06-04 NOTE — ASSESSMENT & PLAN NOTE
Normotensive in the office today.  Kidney function remained stable with normal electrolytes.  At this time she will continue with carvedilol 3.125 mg twice a day and losartan 50 mg daily.  Repeat CMP prior to next office visit.

## 2025-06-04 NOTE — PATIENT INSTRUCTIONS
Start Januvia 25 mg daily. Continue with Glipizide.    Work on lifestyle.    Call with any concerns or questions.    Follow up in 3 months with labs.

## 2025-06-04 NOTE — PROGRESS NOTES
Name: Marian Myers      : 1965      MRN: [id number]  Encounter Provider: Woody Corona PA-C  Encounter Date: 2025   Encounter department: [facility]    No chief complaint on file.  :  Assessment & Plan  Type 2 diabetes mellitus with hyperglycemia, without long-term current use of insulin (HCC)  Most recent hemoglobin A1c has increased.  This was even prior to her discontinuing her Farxiga on her own.  Although I do not believe some of her major concerns with the Farxiga are from the medication, I am fine with discontinuing it at this time.  If needed we could try a different SGLT2.  Discussed possible options of other medication classes to try such as a GLP-1, pioglitazone, and DPP 4 inhibitor.  At this time she would like to try Tradjenta 5 mg daily.  Prescription was sent over to the pharmacy.  Discussed side effects of medication.  Recommend checking blood sugars at least daily.  Continue working on lifestyle to help improve glucose levels.  Contact the office with any concerns or questions.  Follow-up in 3 months with labwork completed prior to visit.  Lab Results   Component Value Date    HGBA1C 8.6 (H) 2025       Orders:  •  linaGLIPtin (Tradjenta) 5 MG TABS; Take 5 mg by mouth daily  •  Hemoglobin A1C; Future  •  Comprehensive metabolic panel; Future    Primary hypertension  Normotensive in the office today.  Kidney function remained stable with normal electrolytes.  At this time she will continue with carvedilol 3.125 mg twice a day and losartan 50 mg daily.  Repeat CMP prior to next office visit.       Mixed hyperlipidemia  Triglycerides elevated likely due to higher glucose levels.  Work on lifestyle to help improve triglycerides.  Continue with rosuvastatin 40 mg daily.             History of Present Illness     Marian Myers is a 59 y.o. female with type 2 diabetes diagnosed around  with hypertension and hyperlipidemia.   "She is on oral agents at home and takes glipizide XL 10 mg twice a day.  Stop Farxiga on her own.  Previously was on metformin but had GI side effects.  She denies any polyuria, polydipsia, nocturia and blurry vision.  She denies retinopathy, heart attack, stroke, and claudication but does admit to neuropathy and nephropathy.  In general is doing well.  Is limiting the amount of carbohydrates in her diet.  Does walk on a daily basis.    States with the Farxiga her biggest complaints were developing rashes, and also myalgias.  This is why she stopped the medication.    Hypoglycemic episodes: No.     The patient's last eye exam was in March 2022.  The patient's last foot exam was in December 2024.    Blood Sugar/Glucometer/Pump/CGM review: Does not routinely check glucose levels.    Current diabetic regimen:   glipizide XL 10 mg twice a day    Review of Systems   Constitutional:  Negative for activity change, appetite change, fatigue and unexpected weight change.   HENT:  Negative for sore throat and trouble swallowing.    Eyes:  Negative for visual disturbance.   Respiratory:  Negative for chest tightness and shortness of breath.    Cardiovascular:  Negative for chest pain, palpitations and leg swelling.   Gastrointestinal:  Negative for abdominal pain, constipation, diarrhea, nausea and vomiting.   Endocrine: Negative for cold intolerance, heat intolerance, polydipsia, polyphagia and polyuria.   Genitourinary:  Negative for frequency.   Skin:  Positive for rash. Negative for wound.   Neurological:  Negative for dizziness, weakness, numbness and headaches.   Psychiatric/Behavioral:  Negative for dysphoric mood and sleep disturbance. The patient is not nervous/anxious.     as per HPI    Medical History Reviewed by provider this encounter:     .  Medications Ordered Prior to Encounter[1]   Social History[2]     Medical History Reviewed by provider this encounter:     .    Objective   /80   Pulse 68   Ht 5' 1\" " (1.549 m)   Wt 84.4 kg (186 lb)   SpO2 98%   BMI 35.14 kg/m²      Body mass index is 35.14 kg/m².  Wt Readings from Last 3 Encounters:   06/04/25 84.4 kg (186 lb)   05/01/25 83.5 kg (184 lb)   05/01/25 84.4 kg (186 lb)     Physical Exam  Vitals and nursing note reviewed.   Constitutional:       General: She is not in acute distress.     Appearance: Normal appearance. She is well-developed. She is not diaphoretic.     Eyes:      General: No scleral icterus.     Extraocular Movements: Extraocular movements intact.      Conjunctiva/sclera: Conjunctivae normal.      Pupils: Pupils are equal, round, and reactive to light.       Cardiovascular:      Rate and Rhythm: Normal rate and regular rhythm.      Pulses: Normal pulses.      Heart sounds: Normal heart sounds. No murmur heard.     No friction rub. No gallop.   Pulmonary:      Effort: Pulmonary effort is normal. No tachypnea, bradypnea or respiratory distress.      Breath sounds: Normal breath sounds. No wheezing.     Musculoskeletal:      Cervical back: Normal range of motion.      Right lower leg: No edema.      Left lower leg: No edema.   Lymphadenopathy:      Cervical: No cervical adenopathy.     Skin:     General: Skin is warm and dry.      Findings: Rash present. Rash is scaling (Left foot).     Neurological:      Mental Status: She is alert and oriented to person, place, and time. Mental status is at baseline. She is not disoriented.      Motor: No abnormal muscle tone.      Gait: Gait normal.      Deep Tendon Reflexes: Reflexes are normal and symmetric.     Psychiatric:         Mood and Affect: Mood normal.         Behavior: Behavior normal.         Thought Content: Thought content normal.       Last Eye Exam: Not on file  Last Foot Exam: 12/27/2024  Health Maintenance   Topic Date Due   • Diabetic Eye Exam  03/14/2023   • Diabetic Foot Exam  12/27/2025         Labs: I have reviewed pertinent labs including:   Lab Results   Component Value Date    HGBA1C 8.6  (H) 04/28/2025    HGBA1C 7.8 (A) 10/14/2024    HGBA1C 6.8 (A) 05/31/2024      Lab Results   Component Value Date    CREATININE 0.90 04/28/2025    CREATININE 0.91 12/05/2023    CREATININE 0.91 09/12/2023    BUN 18 04/28/2025    K 4.0 04/28/2025     04/28/2025    CO2 28 04/28/2025      eGFR   Date Value Ref Range Status   04/28/2025 70 ml/min/1.73sq m Final      HDL, Direct   Date Value Ref Range Status   04/28/2025 38 (L) >=50 mg/dL Final     Triglycerides   Date Value Ref Range Status   04/28/2025 201 (H) See Comment mg/dL Final     Comment:     Triglyceride:     0-9Y            <75mg/dL     10Y-17Y         <90 mg/dL       >=18Y     Normal          <150 mg/dL     Borderline High 150-199 mg/dL     High            200-499 mg/dL        Very High       >499 mg/dL    Specimen collection should occur prior to Metamizole administration due to the potential for falsely depressed results.      ALT   Date Value Ref Range Status   04/28/2025 18 7 - 52 U/L Final     Comment:     Specimen collection should occur prior to Sulfasalazine administration due to the potential for falsely depressed results.      AST   Date Value Ref Range Status   04/28/2025 14 13 - 39 U/L Final     Alkaline Phosphatase   Date Value Ref Range Status   04/28/2025 97 34 - 104 U/L Final      Lab Results   Component Value Date    FCV1GTZXZDKN 2.516 04/28/2025        Patient Instructions   Start Januvia 25 mg daily. Continue with Glipizide.    Work on lifestyle.    Call with any concerns or questions.    Follow up in 3 months with labs.    Discussed with the patient and all questioned fully answered. She will call me if any problems arise.             [1]  Current Outpatient Medications on File Prior to Visit   Medication Sig Dispense Refill   • aspirin (ECOTRIN LOW STRENGTH) 81 mg EC tablet Take 81 mg by mouth in the morning.     • buPROPion (WELLBUTRIN XL) 150 mg 24 hr tablet TAKE 1 TABLET BY MOUTH DAILY IN  THE MORNING 90 tablet 0   • carvedilol  (COREG) 3.125 mg tablet Take 1 tablet (3.125 mg total) by mouth 2 (two) times a day with meals 180 tablet 3   • escitalopram (LEXAPRO) 10 mg tablet Take 1 tablet (10 mg total) by mouth 2 (two) times a day 180 tablet 1   • glipiZIDE (GLUCOTROL XL) 10 mg 24 hr tablet TAKE 1 TABLET BY MOUTH TWICE  DAILY 180 tablet 1   • glucose blood (OneTouch Ultra) test strip Use 1 daily 90 strip 2   • Lancets 28G MISC Use 1 Box 3 (three) times a day One Touch Ultra Mini lancets 3 each 2   • losartan (COZAAR) 50 mg tablet TAKE 1 TABLET BY MOUTH DAILY 90 tablet 0   • rosuvastatin (CRESTOR) 40 MG tablet TAKE 1 TABLET BY MOUTH DAILY 90 tablet 1   • zolpidem (AMBIEN) 10 mg tablet Take 1 tablet (10 mg total) by mouth daily at bedtime as needed for sleep 90 tablet 0   • [DISCONTINUED] dapagliflozin (Farxiga) 10 MG tablet Take 1 tablet (10 mg total) by mouth daily (Patient not taking: Reported on 2025) 90 tablet 3     No current facility-administered medications on file prior to visit.   [2]  Social History  Tobacco Use   • Smoking status: Former     Current packs/day: 0.00     Types: Cigarettes     Quit date:      Years since quittin.4   • Smokeless tobacco: Never   Vaping Use   • Vaping status: Every Day   Substance and Sexual Activity   • Alcohol use: Yes     Comment: special events   • Drug use: Yes     Types: Marijuana     Comment: daily prn

## 2025-06-04 NOTE — ASSESSMENT & PLAN NOTE
Triglycerides elevated likely due to higher glucose levels.  Work on lifestyle to help improve triglycerides.  Continue with rosuvastatin 40 mg daily.

## 2025-07-10 DIAGNOSIS — G47.00 INSOMNIA, UNSPECIFIED TYPE: ICD-10-CM

## 2025-07-11 RX ORDER — ZOLPIDEM TARTRATE 10 MG/1
TABLET ORAL
Qty: 90 TABLET | Refills: 0 | Status: SHIPPED | OUTPATIENT
Start: 2025-07-11

## 2025-07-20 DIAGNOSIS — F33.9 DEPRESSION, RECURRENT (HCC): ICD-10-CM

## 2025-07-20 DIAGNOSIS — I10 ESSENTIAL HYPERTENSION: ICD-10-CM

## 2025-07-21 RX ORDER — LOSARTAN POTASSIUM 50 MG/1
50 TABLET ORAL DAILY
Qty: 90 TABLET | Refills: 1 | Status: SHIPPED | OUTPATIENT
Start: 2025-07-21

## 2025-07-21 RX ORDER — BUPROPION HYDROCHLORIDE 150 MG/1
150 TABLET ORAL EVERY MORNING
Qty: 90 TABLET | Refills: 1 | Status: SHIPPED | OUTPATIENT
Start: 2025-07-21